# Patient Record
Sex: MALE | Race: WHITE | NOT HISPANIC OR LATINO | Employment: FULL TIME | URBAN - METROPOLITAN AREA
[De-identification: names, ages, dates, MRNs, and addresses within clinical notes are randomized per-mention and may not be internally consistent; named-entity substitution may affect disease eponyms.]

---

## 2017-01-23 ENCOUNTER — GENERIC CONVERSION - ENCOUNTER (OUTPATIENT)
Dept: OTHER | Facility: OTHER | Age: 62
End: 2017-01-23

## 2017-02-02 ENCOUNTER — ALLSCRIPTS OFFICE VISIT (OUTPATIENT)
Dept: OTHER | Facility: OTHER | Age: 62
End: 2017-02-02

## 2017-04-03 ENCOUNTER — TRANSCRIBE ORDERS (OUTPATIENT)
Dept: ADMINISTRATIVE | Facility: HOSPITAL | Age: 62
End: 2017-04-03

## 2017-04-11 ENCOUNTER — HOSPITAL ENCOUNTER (OUTPATIENT)
Dept: SLEEP CENTER | Facility: HOSPITAL | Age: 62
Discharge: HOME | End: 2017-04-11
Attending: INTERNAL MEDICINE
Payer: COMMERCIAL

## 2017-04-11 ENCOUNTER — TRANSCRIBE ORDERS (OUTPATIENT)
Dept: SLEEP CENTER | Facility: HOSPITAL | Age: 62
End: 2017-04-11

## 2017-04-11 DIAGNOSIS — G47.33 OSA TREATED WITH BIPAP: ICD-10-CM

## 2017-04-11 DIAGNOSIS — G47.33 OBSTRUCTIVE SLEEP APNEA (ADULT) (PEDIATRIC): Primary | ICD-10-CM

## 2017-08-08 ENCOUNTER — GENERIC CONVERSION - ENCOUNTER (OUTPATIENT)
Dept: OTHER | Facility: OTHER | Age: 62
End: 2017-08-08

## 2017-09-18 ENCOUNTER — GENERIC CONVERSION - ENCOUNTER (OUTPATIENT)
Dept: OTHER | Facility: OTHER | Age: 62
End: 2017-09-18

## 2017-09-19 ENCOUNTER — ALLSCRIPTS OFFICE VISIT (OUTPATIENT)
Dept: OTHER | Facility: OTHER | Age: 62
End: 2017-09-19

## 2017-09-20 ENCOUNTER — APPOINTMENT (OUTPATIENT)
Dept: LAB | Facility: CLINIC | Age: 62
End: 2017-09-20
Payer: COMMERCIAL

## 2017-09-20 ENCOUNTER — TRANSCRIBE ORDERS (OUTPATIENT)
Dept: ADMINISTRATIVE | Facility: HOSPITAL | Age: 62
End: 2017-09-20

## 2017-09-20 DIAGNOSIS — I25.10 ATHEROSCLEROSIS OF NATIVE CORONARY ARTERY, ANGINA PRESENCE UNSPECIFIED, UNSPECIFIED WHETHER NATIVE OR TRANSPLANTED HEART: ICD-10-CM

## 2017-09-20 DIAGNOSIS — E78.2 MIXED HYPERLIPIDEMIA: ICD-10-CM

## 2017-09-20 DIAGNOSIS — I71.4 ABDOMINAL AORTIC ANEURYSM WITHOUT RUPTURE (HCC): Primary | ICD-10-CM

## 2017-09-20 DIAGNOSIS — IMO0002 UNCONTROLLED TYPE 2 DIABETES MELLITUS WITH COMPLICATION, WITHOUT LONG-TERM CURRENT USE OF INSULIN: ICD-10-CM

## 2017-09-20 DIAGNOSIS — I71.4 ABDOMINAL AORTIC ANEURYSM WITHOUT RUPTURE (HCC): ICD-10-CM

## 2017-09-20 LAB
ALBUMIN SERPL BCP-MCNC: 3.7 G/DL (ref 3.5–5)
ALP SERPL-CCNC: 64 U/L (ref 46–116)
ALT SERPL W P-5'-P-CCNC: 21 U/L (ref 12–78)
ANION GAP SERPL CALCULATED.3IONS-SCNC: 6 MMOL/L (ref 4–13)
AST SERPL W P-5'-P-CCNC: 11 U/L (ref 5–45)
BASOPHILS # BLD AUTO: 0.02 THOUSANDS/ΜL (ref 0–0.1)
BASOPHILS NFR BLD AUTO: 0 % (ref 0–1)
BILIRUB DIRECT SERPL-MCNC: 0.14 MG/DL (ref 0–0.2)
BILIRUB SERPL-MCNC: 0.41 MG/DL (ref 0.2–1)
BUN SERPL-MCNC: 16 MG/DL (ref 5–25)
CALCIUM SERPL-MCNC: 9.8 MG/DL (ref 8.3–10.1)
CHLORIDE SERPL-SCNC: 106 MMOL/L (ref 100–108)
CHOLEST SERPL-MCNC: 113 MG/DL (ref 50–200)
CK SERPL-CCNC: 65 U/L (ref 39–308)
CO2 SERPL-SCNC: 25 MMOL/L (ref 21–32)
CREAT SERPL-MCNC: 0.85 MG/DL (ref 0.6–1.3)
EOSINOPHIL # BLD AUTO: 0.02 THOUSAND/ΜL (ref 0–0.61)
EOSINOPHIL NFR BLD AUTO: 0 % (ref 0–6)
ERYTHROCYTE [DISTWIDTH] IN BLOOD BY AUTOMATED COUNT: 13.3 % (ref 11.6–15.1)
EST. AVERAGE GLUCOSE BLD GHB EST-MCNC: 154 MG/DL
GFR SERPL CREATININE-BSD FRML MDRD: 93 ML/MIN/1.73SQ M
GLUCOSE P FAST SERPL-MCNC: 148 MG/DL (ref 65–99)
HBA1C MFR BLD: 7 % (ref 4.2–6.3)
HCT VFR BLD AUTO: 43 % (ref 36.5–49.3)
HDLC SERPL-MCNC: 52 MG/DL (ref 40–60)
HGB BLD-MCNC: 14.1 G/DL (ref 12–17)
LDLC SERPL CALC-MCNC: 48 MG/DL (ref 0–100)
LYMPHOCYTES # BLD AUTO: 1.08 THOUSANDS/ΜL (ref 0.6–4.47)
LYMPHOCYTES NFR BLD AUTO: 8 % (ref 14–44)
MCH RBC QN AUTO: 31.1 PG (ref 26.8–34.3)
MCHC RBC AUTO-ENTMCNC: 32.8 G/DL (ref 31.4–37.4)
MCV RBC AUTO: 95 FL (ref 82–98)
MONOCYTES # BLD AUTO: 0.36 THOUSAND/ΜL (ref 0.17–1.22)
MONOCYTES NFR BLD AUTO: 3 % (ref 4–12)
NEUTROPHILS # BLD AUTO: 11.78 THOUSANDS/ΜL (ref 1.85–7.62)
NEUTS SEG NFR BLD AUTO: 89 % (ref 43–75)
NRBC BLD AUTO-RTO: 0 /100 WBCS
PLATELET # BLD AUTO: 170 THOUSANDS/UL (ref 149–390)
PMV BLD AUTO: 11.2 FL (ref 8.9–12.7)
POTASSIUM SERPL-SCNC: 4.4 MMOL/L (ref 3.5–5.3)
PROT SERPL-MCNC: 7.9 G/DL (ref 6.4–8.2)
RBC # BLD AUTO: 4.54 MILLION/UL (ref 3.88–5.62)
SODIUM SERPL-SCNC: 137 MMOL/L (ref 136–145)
TRIGL SERPL-MCNC: 66 MG/DL
TSH SERPL DL<=0.05 MIU/L-ACNC: 0.67 UIU/ML (ref 0.36–3.74)
WBC # BLD AUTO: 13.32 THOUSAND/UL (ref 4.31–10.16)

## 2017-09-20 PROCEDURE — 36415 COLL VENOUS BLD VENIPUNCTURE: CPT

## 2017-09-20 PROCEDURE — 80061 LIPID PANEL: CPT

## 2017-09-20 PROCEDURE — 80076 HEPATIC FUNCTION PANEL: CPT

## 2017-09-20 PROCEDURE — 84443 ASSAY THYROID STIM HORMONE: CPT

## 2017-09-20 PROCEDURE — 82550 ASSAY OF CK (CPK): CPT

## 2017-09-20 PROCEDURE — 80048 BASIC METABOLIC PNL TOTAL CA: CPT

## 2017-09-20 PROCEDURE — 85025 COMPLETE CBC W/AUTO DIFF WBC: CPT

## 2017-09-20 PROCEDURE — 83036 HEMOGLOBIN GLYCOSYLATED A1C: CPT

## 2017-10-02 ENCOUNTER — GENERIC CONVERSION - ENCOUNTER (OUTPATIENT)
Dept: OTHER | Facility: OTHER | Age: 62
End: 2017-10-02

## 2017-10-09 ENCOUNTER — GENERIC CONVERSION - ENCOUNTER (OUTPATIENT)
Dept: OTHER | Facility: OTHER | Age: 62
End: 2017-10-09

## 2017-10-23 ENCOUNTER — GENERIC CONVERSION - ENCOUNTER (OUTPATIENT)
Dept: FAMILY MEDICINE CLINIC | Facility: CLINIC | Age: 62
End: 2017-10-23

## 2017-10-24 ENCOUNTER — GENERIC CONVERSION - ENCOUNTER (OUTPATIENT)
Dept: OTHER | Facility: OTHER | Age: 62
End: 2017-10-24

## 2017-10-26 ENCOUNTER — GENERIC CONVERSION - ENCOUNTER (OUTPATIENT)
Dept: OTHER | Facility: OTHER | Age: 62
End: 2017-10-26

## 2017-11-02 ENCOUNTER — GENERIC CONVERSION - ENCOUNTER (OUTPATIENT)
Dept: OTHER | Facility: OTHER | Age: 62
End: 2017-11-02

## 2017-11-14 ENCOUNTER — GENERIC CONVERSION - ENCOUNTER (OUTPATIENT)
Dept: OTHER | Facility: OTHER | Age: 62
End: 2017-11-14

## 2017-12-20 ENCOUNTER — GENERIC CONVERSION - ENCOUNTER (OUTPATIENT)
Dept: FAMILY MEDICINE CLINIC | Facility: CLINIC | Age: 62
End: 2017-12-20

## 2017-12-20 LAB
LEFT EYE DIABETIC RETINOPATHY: NORMAL
RIGHT EYE DIABETIC RETINOPATHY: NORMAL

## 2017-12-27 ENCOUNTER — ALLSCRIPTS OFFICE VISIT (OUTPATIENT)
Dept: OTHER | Facility: OTHER | Age: 62
End: 2017-12-27

## 2017-12-28 NOTE — PROGRESS NOTES
Assessment   1  Controlled type 2 diabetes mellitus without complication (345 82) (E10 8)   2  Chronic obstructive pulmonary disease (496) (J44 9)   3  Essential hypertension (401 9) (I10)   4  Hyperlipidemia (272 4) (E78 5)    Plan   Biceps tendinitis of right upper extremity    · Vicodin 5-300 MG Oral Tablet; 1 every 6 hours for pain  Controlled type 2 diabetes mellitus without complication    · Glimepiride 1 MG Oral Tablet; TAKE ONE TABLET BY MOUTH TWICE DAILY   · Kombiglyze XR 5-1000 MG Oral Tablet Extended Release 24 Hour; One daily  COPD (chronic obstructive pulmonary disease) with acute bronchitis    · ProAir  (90 Base) MCG/ACT Inhalation Aerosol Solution; INHALE 1 TO 2    PUFFS EVERY 6 HOURS AS NEEDED  Lumbago    · Cyclobenzaprine HCl - 5 MG Oral Tablet; 1 tablet every 6-8 hours prn    Discussion/Summary      Patient is doing well with no acute complaints medications current medications in 6 months or as needed on health maintenance  Chief Complaint   Check up Diabetes/Hypertension/CAD/Hyperlipidemia  Renew all medicines to Prof Pharm  Patient is here today for follow up of chronic conditions described in HPI  History of Present Illness   here for checkup acute complaints  refills of meds      Review of Systems        Constitutional: No fever or chills, feels well, no tiredness, no recent weight gain or weight loss  Eyes: No complaints of eye pain, no red eyes, no discharge from eyes, no itchy eyes  ENT: no complaints of earache, no hearing loss, no nosebleeds, no nasal discharge, no sore throat, no hoarseness  Cardiovascular: No complaints of slow heart rate, no fast heart rate, no chest pain, no palpitations, no leg claudication, no lower extremity  Respiratory: No complaints of shortness of breath, no wheezing, no cough, no SOB on exertion, no orthopnea or PND        Gastrointestinal: No complaints of abdominal pain, no constipation, no nausea or vomiting, no diarrhea or bloody stools  Genitourinary: No complaints of dysuria, no incontinence, no hesitancy, no nocturia, no genital lesion, no testicular pain  Musculoskeletal: No complaints of arthralgia, no myalgias, no joint swelling or stiffness, no limb pain or swelling  Integumentary: No complaints of skin rash or skin lesions, no itching, no skin wound, no dry skin  Neurological: No compliants of headache, no confusion, no convulsions, no numbness or tingling, no dizziness or fainting, no limb weakness, no difficulty walking  Psychiatric: Is not suicidal, no sleep disturbances, no anxiety or depression, no change in personality, no emotional problems  Endocrine: No complaints of proptosis, no hot flashes, no muscle weakness, no erectile dysfunction, no deepening of the voice, no feelings of weakness  Hematologic/Lymphatic: No complaints of swollen glands, no swollen glands in the neck, does not bleed easily, no easy bruising  Active Problems   1  Biceps tendinitis of right upper extremity (726 12) (M75 21)   2  BPH without urinary obstruction (600 00) (N40 0)   3  Chronic obstructive pulmonary disease (496) (J44 9)   4  Controlled type 2 diabetes mellitus without complication (271 62) (M88 5)   5  COPD (chronic obstructive pulmonary disease) with acute bronchitis (491 22)     (J44 0,J20 9)   6  Coronary artery disease (414 00) (I25 10)   7  Essential hypertension (401 9) (I10)   8  Hyperlipidemia (272 4) (E78 5)   9  Lumbago (724 2) (M54 5)   10  Obesity (278 00) (E66 9)   11  Sleep apnea (780 57) (G47 30)    Past Medical History   1  Acute bronchitis (466 0) (J20 9)   2  History of Arthralgia of left knee (719 46) (M25 562)   3  History of Bronchitis, asthmatic (493 90) (J45 909)   4  History of Cough (786 2) (R05)   5  History of Furuncle (680 9) (L02 92)   6  History of Hamstring strain, left, initial encounter   7  History of acute bronchitis (V12 69) (Z87 09)   8   History of acute sinusitis (V12 69) (Z87 09)   9  History of wheezing (V12 69) (Z87 898)   10  Old myocardial infarction (412) (I25 2)   11  History of Skin growth (239 2) (D49 2)   12  History of Tear of medial meniscus of left knee, subsequent encounter (V58 89,836 0)      (S83 242D)   13  History of Upper respiratory infection (465 9) (J06 9)     The active problems and past medical history were reviewed and updated today  Surgical History   1  History of Appendectomy   2  History of CABG   3  History of Knee Arthroscopy (Therapeutic)   4  History of Knee Surgery   5  History of PTCA   6  History of Tonsillectomy With Adenoidectomy   7  History of Umbilical Hernia Repair     The surgical history was reviewed and updated today  Family History   Mother    1  Adopted (V68 89) (Z02 82)  Father    2  Adopted (W66 69) (Z02 82)     The family history was reviewed and updated today  Social History    · Former smoker (V15 82) (H53 976)   · Marital History - Currently    · Sedentary Lifestyle (V69 0)   · Working Full Time  The social history was reviewed and updated today  The social history was reviewed and is unchanged  Current Meds    1  Atenolol 25 MG Oral Tablet; Take 1 tablet daily Recorded   2  Karla Contour Test In Vitro Strip; TEST ONCE DAILY; Therapy: 23ZUY1688 to (Last Rx:26Oct2017)  Requested for: 26Oct2017 Ordered   3  Cyclobenzaprine HCl - 5 MG Oral Tablet; 1 tablet every 6-8 hours prn; Therapy: 63KSI0247 to (Evaluate:50Lhl3257)  Requested for: 70MYC3797; Last     Rx:50Nog5716 Ordered   4  Glimepiride 1 MG Oral Tablet; TAKE ONE TABLET BY MOUTH TWICE DAILY; Therapy: 98OHS3301 to [phone])  Requested for: 26Oct2017; Last     Rx:26Oct2017 Ordered   5  Hydrocodone-Acetaminophen 5-325 MG Oral Tablet; Take one tab every 4-6 hours as     needed Recorded   6  Kombiglyze XR 5-1000 MG Oral Tablet Extended Release 24 Hour; One daily;      Therapy: 53SYG0407 to (GJYJEVNT:49FNO4998)  Requested for: 26Oct2017; Last     Rx:26Oct2017 Ordered   7  Lancets Miscellaneous; use daily as directed; Therapy: 85UCE2590 to (Last Rx:26Oct2017)  Requested for: 26Oct2017 Ordered   8  Plavix 75 MG Oral Tablet; Take 1 tablet daily Recorded   9  ProAir  (90 Base) MCG/ACT Inhalation Aerosol Solution; INHALE 1 TO 2 PUFFS     EVERY 6 HOURS AS NEEDED; Therapy: 80BZF0560 to (Last Rx:02Feb2017)  Requested for: 02Feb2017 Ordered   10  SEROquel 100 MG Oral Tablet; TAKE 1 TABLET DAILY; Therapy: [phone]) to Recorded   11  Viagra 100 MG Oral Tablet; Therapy: 96NYY1719 to (Evaluate:18Nov2014) Recorded   12  Vicodin 5-300 MG Oral Tablet; 1 every 6 hours for pain; Therapy: 19Sep2017 to (Evaluate:24Sep2017); Last Rx:19Sep2017 Ordered   13  Vytorin 10-40 MG Oral Tablet; TAKE 1 TABLET DAILY; Therapy: (07 [phone]) to Recorded     The medication list was reviewed and updated today  Allergies   1  Penicillins    Vitals   Vital Signs    Recorded: 42VED1567 11:20AM   Heart Rate 60   Systolic 012, RUE, Sitting   Diastolic 80, RUE, Sitting   Height 5 ft 10 in   Weight 243 lb    BMI Calculated 34 87   BSA Calculated 2 27     Physical Exam        Constitutional      General appearance: No acute distress, well appearing and well nourished  Ears, Nose, Mouth, and Throat      Otoscopic examination: Tympanic membrance translucent with normal light reflex  Canals patent without erythema  Oropharynx: Normal with no erythema, edema, exudate or lesions  Pulmonary      Respiratory effort: No increased work of breathing or signs of respiratory distress  Auscultation of lungs: Clear to auscultation, equal breath sounds bilaterally, no wheezes, no rales, no rhonci  Cardiovascular      Auscultation of heart: Normal rate and rhythm, normal S1 and S2, without murmurs         Carotid pulses: Normal        Abdomen      Abdomen: Non-tender, no masses  Liver and spleen: No hepatomegaly or splenomegaly  Lymphatic      Palpation of lymph nodes in neck: No lymphadenopathy  Musculoskeletal      Gait and station: Normal        Inspection/palpation of joints, bones, and muscles: Normal        Skin      Skin and subcutaneous tissue: Normal without rashes or lesions  Neurologic      Cranial nerves: Cranial nerves 2-12 intact  Sensation: No sensory loss  Psychiatric      Orientation to person, place and time: Normal        Mood and affect: Normal           Health Management   Chronic obstructive pulmonary disease   Complete PFT; every 1 year; Next Due: 43PWH8941; Overdue  Controlled type 2 diabetes mellitus without complication   (1) BASIC METABOLIC PROFILE; every 1 year; Last 70PAF9479; Next Due: 97SIC7146; Active  (1) LIPID PANEL, FASTING; every 1 year; Last 33WYR8453; Next Due: 98Ldg8773; Overdue  (1) MICROALBUMIN CREATININE RATIO, RANDOM URINE; every 1 year; Last 60Xcf0899; Next Due:    65RHO3625; Overdue  *VB - Eye Exam; every 1 year; Last 12Qng8477; Next Due: 11Ycu2361; Active  History of Screen for colon cancer   COLONOSCOPY; every 5 years; Last 34VHO4129; Next Due: 19VHJ6007;  Active    Future Appointments      Date/Time Provider Specialty Site   07/02/2018 02:45 PM Caitlyn Bliss DO Family Medicine [facility]     Signatures    Electronically signed by : Eliz Crespo DO; Dec 27 2017  4:38PM EST                       (Author)

## 2018-01-12 NOTE — MISCELLANEOUS
Message   Recorded as Task   Date: 01/23/2017 10:14 AM, Created By: Kamilah Hackett   Task Name: Follow Up   Assigned To: Nydia Ni   Regarding Patient: Peggy Mcgregor, Status: Active   CommentGigail Conrado - 23 Jan 2017 10:14 AM     TASK CREATED  Caller: Self; Other; [phone] (Home); [phone] (Work)  PT NEEDS PRE [address] [employer] TO FUTURE SCRIPTS  HE CAN BE REACHED [phone]   Sukhjinder Argueta - 23 Jan 2017 12:42 PM     TASK EDITED  tell him I sent it out this morning it may take a day or 2   Nydia Ni - 23 Jan 2017 1:23 PM     TASK EDITED  Message left on cell -- will call when prior auth comes through  [facility]        Active Problems    1  BPH without urinary obstruction (600 00) (N40 0)   2  Chronic obstructive pulmonary disease (496) (J44 9)   3  Controlled type 2 diabetes mellitus without complication (825 86) (X11 0)   4  COPD (chronic obstructive pulmonary disease) with acute bronchitis (491 22) (J44 0)   5  Coronary artery disease (414 00) (I25 10)   6  Essential hypertension (401 9) (I10)   7  Hyperlipidemia (272 4) (E78 5)   8  Lumbago (724 2) (M54 5)   9  Need for pneumococcal vaccination (V03 82) (Z23)   10  Obesity (278 00) (E66 9)   11  Sleep apnea (780 57) (G47 30)    Current Meds   1  Atenolol 25 MG Oral Tablet; Take 1 tablet daily Recorded   2  Karla Contour Test In Vitro Strip; TEST ONCE DAILY; Therapy: 91JST7876 to (Evaluate:79Xca5541)  Requested for: 96Isx6881; Last   Rx:71Lnn3906 Ordered   3  Cyclobenzaprine HCl - 5 MG Oral Tablet; 1 tablet every 6-8 hours prn; Therapy: 81NPV1707 to (Evaluate:64Xwu2692)  Requested for: 15XYG5082; Last   Rx:25Ulb5217 Ordered   4  Glimepiride 1 MG Oral Tablet; TAKE ONE TABLET BY MOUTH TWICE DAILY; Therapy: 82FWK3256 to (Evaluate:96Ddf2132)  Requested for: 22ZFU3378; Last   Rx:26Ipi5476 Ordered   5  Hydrocodone-Acetaminophen 5-325 MG Oral Tablet; Take one tab every 4-6 hours as   needed Recorded   6   Kombiglyze XR 5-1000 MG Oral Tablet Extended Release 24 Hour; One daily; Therapy: 14OIA3531 to (Evaluate:43Eum8257)  Requested for: 33ZHZ9419; Last   Rx:92Col0049 Ordered   7  Meloxicam 15 MG Oral Tablet; TAKE 1 TABLET TWICE DAILY; Therapy: 97QOZ6042 to [phone])  Requested for: 36IJY9665; Last   Rx:27Nov2015 Ordered   8  Plavix 75 MG Oral Tablet (Clopidogrel Bisulfate); Take 1 tablet daily Recorded   9  Proventil  (90 Base) MCG/ACT Inhalation Aerosol Solution; INHALE 2 PUFFS   EVERY 4-6 HOURS AS NEEDED; Therapy: 10CUG8096 to (Deidra Floyd)  Requested for: 47PIC3434; Last   Rx:23Jan2015 Ordered   10  SEROquel 100 MG Oral Tablet (QUEtiapine Fumarate); TAKE 1 TABLET DAILY; Therapy: (450 45 295) to Recorded   11  Viagra 100 MG Oral Tablet; Therapy: 54ITW7202 to (Evaluate:18Nov2014) Recorded   12  Vytorin 10-40 MG Oral Tablet; TAKE 1 TABLET DAILY; Therapy: (Recorded:05Nov2013) to Recorded    Allergies    1   Penicillins    Signatures   Electronically signed by : Markel Marrero, ; Jan 23 2017  1:23PM EST                       (Author)

## 2018-01-14 VITALS
BODY MASS INDEX: 35.5 KG/M2 | SYSTOLIC BLOOD PRESSURE: 130 MMHG | WEIGHT: 248 LBS | TEMPERATURE: 97.9 F | HEART RATE: 68 BPM | HEIGHT: 70 IN | DIASTOLIC BLOOD PRESSURE: 70 MMHG

## 2018-01-14 VITALS
HEIGHT: 70 IN | BODY MASS INDEX: 34.79 KG/M2 | DIASTOLIC BLOOD PRESSURE: 74 MMHG | HEART RATE: 70 BPM | SYSTOLIC BLOOD PRESSURE: 124 MMHG | WEIGHT: 243 LBS

## 2018-01-15 NOTE — MISCELLANEOUS
Provider Comments  Provider Comments:   PT NO SHOW FOR CHECK UP LETTER SENT      Signatures   Electronically signed by : MKYEL Anderson ; Apr 13 2016  4:14PM EST                       (Author)

## 2018-01-17 NOTE — MISCELLANEOUS
Message   Recorded as Task   Date: 03/21/2016 01:10 PM, Created By: Stef Larkin   Task Name: Med Renewal Request   Assigned To: Nydia Ni   Regarding Patient: Patricia Qiu, Status: Active   Comment:    Stef Larkin - 21 Mar 2016 1:10 PM     TASK CREATED  Caller: Self; Renew Medication; [phone] (Home); [phone] (Work)  PT IS DOING A LITTLE BETTER, BUT STILL HAS COUGH, KEEPING HIM UP AT NIGHT  WOULD LIKE ANOTHER SCRIPT WRITTEN  CAN CALL PT [phone]   Sukhjinder Argueta - 21 Mar 2016 4:05 PM     TASK REASSIGNED: Previously Assigned To Elvie Holes him to come in tomorrow for him an appointment   Nydia Ni - 21 Mar 2016 4:41 PM     TASK EDITED  Patient made appt  for 2:15 PM tomorrow 03/22/16  PLM        Active Problems    1  Aftercare following surgery of the musculoskeletal system (V58 78) (Z47 89)   2  Chronic obstructive pulmonary disease (496) (J44 9)   3  Controlled type 2 diabetes mellitus without complication (834 26) (W68 9)   4  COPD (chronic obstructive pulmonary disease) with acute bronchitis (491 22) (J44 0)   5  Coronary artery disease (414 00) (I25 10)   6  Essential hypertension (401 9) (I10)   7  Hyperlipidemia (272 4) (E78 5)   8  Lumbago (724 2) (M54 5)   9  Obesity (278 00) (E66 9)   10  Sleep apnea (780 57) (G47 30)    Current Meds   1  Atenolol 25 MG Oral Tablet; Take 1 tablet daily Recorded   2  Karla Contour Test In Vitro Strip; TEST ONCE DAILY; Therapy: 40ADK8280 to (Evaluate:74Wwz9359)  Requested for: 42Xvd0795; Last   Rx:63Mkp8196 Ordered   3  Cefuroxime Axetil 500 MG Oral Tablet; Take 1 tablet twice daily; Therapy: 05DAW7760 to (Complete:25Mar2016)  Requested for: 64SNY8394; Last   Rx:15Mar2016 Ordered   4  Cyclobenzaprine HCl - 5 MG Oral Tablet; 1 tablet every 6-8 hours prn; Therapy: 05AYW8470 to ([phone])  Requested for: 85GEO7843; Last   Rx:02Oct2015 Ordered   5  Kombiglyze XR 5-1000 MG Oral Tablet Extended Release 24 Hour;  One daily; Therapy: 45YPE0856 to (Evaluate:66Edv7157)  Requested for: 51Old8642; Last   Rx:23Feb2016 Ordered   6  Meloxicam 15 MG Oral Tablet; TAKE 1 TABLET TWICE DAILY; Therapy: 69IWE7264 to (Anaid Feast)  Requested for: 77TTM0764; Last   Rx:27Nov2015 Ordered   7  Plavix 75 MG Oral Tablet (Clopidogrel Bisulfate); Take 1 tablet daily Recorded   8  Proventil  (90 Base) MCG/ACT Inhalation Aerosol Solution; INHALE 2 PUFFS   EVERY 4-6 HOURS AS NEEDED; Therapy: 13KPA4669 to (Emma Caio)  Requested for: 36GTF5491; Last   Rx:23Jan2015 Ordered   9  SEROquel 100 MG Oral Tablet (QUEtiapine Fumarate); TAKE 1 TABLET DAILY; Therapy: ([phone]) to Recorded   10  Viagra 100 MG Oral Tablet; Therapy: 99WOO9231 to (Evaluate:18Nov2014) Recorded   11  Vytorin 10-40 MG Oral Tablet; TAKE 1 TABLET DAILY; Therapy: (Recorded:05Nov2013) to Recorded    Allergies    1   Penicillins    Signatures   Electronically signed by : Alda Rojas, ; Mar 21 2016  4:42PM EST                       (Author)

## 2018-01-19 ENCOUNTER — OFFICE VISIT (OUTPATIENT)
Dept: URGENT CARE | Facility: CLINIC | Age: 63
End: 2018-01-19
Payer: COMMERCIAL

## 2018-01-19 PROCEDURE — G0382 LEV 3 HOSP TYPE B ED VISIT: HCPCS

## 2018-01-19 PROCEDURE — 99283 EMERGENCY DEPT VISIT LOW MDM: CPT

## 2018-01-20 NOTE — PROGRESS NOTES
Assessment   1  Acute bronchitis (466 0) (J20 9)    Plan   Acute bronchitis    · Azithromycin 250 MG Oral Tablet; TAKE 2 TABLETS ON DAY 1 THEN TAKE 1    TABLET A DAY FOR 4 DAYS   · Promethazine-Codeine 6 25-10 MG/5ML Oral Syrup; TAKE 5 ML EVERY 4 TO 6    HOURS AS NEEDED    Chief Complaint   1  Cold Symptoms  Chief Complaint Free Text Note Form: Two days ago the patient developed a productive cough and chills  He denies any fevers  History of Present Illness   HPI: This patient has been ill for the last 2 days with a paroxysmal cough productive of mostly clear sputum  No documented fever although he has had some chills and aches  No sore throat or nasal congestion  Cough has been keeping him up at night  No chest pain or dyspnea  is alert oriented pleasant and in no distress  He does not appear toxic  Pupils equal react to light sclerae white conjunctiva pink nose is clear  Throat is clear and moist without inflammation  Neck is supple without nodes  TMs are normal  Lungs are clear bilaterally with good breath sounds  No wheezing rales or rhonchi  Heart is regular  Good skin color and turgor  No skin rash  Hospital Based Practices Required Assessment:      Pain Assessment      the patient states they do not have pain  Abuse And Domestic Violence Screen       Yes, the patient is safe at home  -- The patient states no one is hurting them  Depression And Suicide Screen  No, the patient has not had thoughts of hurting themself  No, the patient has not felt depressed in the past 7 days  Active Problems   1  Biceps tendinitis of right upper extremity (726 12) (M75 21)   2  BPH without urinary obstruction (600 00) (N40 0)   3  Chronic obstructive pulmonary disease (496) (J44 9)   4  Controlled type 2 diabetes mellitus without complication (372 51) (R68 3)   5  COPD (chronic obstructive pulmonary disease) with acute bronchitis (491 22)     (J44 0,J20 9)   6   Coronary artery disease (414 00) (I25 10)   7  Essential hypertension (401 9) (I10)   8  Hyperlipidemia (272 4) (E78 5)   9  Lumbago (724 2) (M54 5)   10  Obesity (278 00) (E66 9)   11  Sleep apnea (780 57) (G47 30)    Past Medical History   1  Acute bronchitis (466 0) (J20 9)   2  History of Arthralgia of left knee (719 46) (M25 562)   3  History of Bronchitis, asthmatic (493 90) (J45 909)   4  History of Cough (786 2) (R05)   5  History of Furuncle (680 9) (L02 92)   6  History of Hamstring strain, left, initial encounter   7  History of acute bronchitis (V12 69) (Z87 09)   8  History of acute sinusitis (V12 69) (Z87 09)   9  History of wheezing (V12 69) (Z87 898)   10  Old myocardial infarction (412) (I25 2)   11  History of Skin growth (239 2) (D49 2)   12  History of Tear of medial meniscus of left knee, subsequent encounter (V58 89,836 0)      (S83 242D)   13  History of Upper respiratory infection (465 9) (J06 9)  Active Problems And Past Medical History Reviewed: The active problems and past medical history were reviewed and updated today  Family History   Mother    1  Adopted (V68 89) (Z02 82)  Father    2  Adopted (V68 89) (Z02 82)  Family History Reviewed: The family history was reviewed and updated today  Social History    · Former smoker (V15 82) (F41 266)   · Marital History - Currently    · Sedentary Lifestyle (V69 0)   · Working Full Time  Social History Reviewed: The social history was reviewed and updated today  Surgical History   1  History of Appendectomy   2  History of CABG   3  History of Knee Arthroscopy (Therapeutic)   4  History of Knee Surgery   5  History of PTCA   6  History of Tonsillectomy With Adenoidectomy   7  History of Umbilical Hernia Repair  Surgical History Reviewed: The surgical history was reviewed and updated today  Current Meds    1  Atenolol 25 MG Oral Tablet; Take 1 tablet daily Recorded   2  Karla Contour Test In Vitro Strip; TEST ONCE DAILY;      Therapy: 96SAR6057 to (Last Rx:26Oct2017)  Requested for: 26Oct2017 Ordered   3  Cyclobenzaprine HCl - 5 MG Oral Tablet; 1 tablet every 6-8 hours prn; Therapy: 00AND2637 to (Evaluate:16Jan2018)  Requested for: 27Dec2017; Last     Rx:25Zsg0876 Ordered   4  Glimepiride 1 MG Oral Tablet; TAKE ONE TABLET BY MOUTH TWICE DAILY; Therapy: 41RSJ3656 to 97 823628)  Requested for: 27Dec2017; Last     Rx:27Dec2017 Ordered   5  Hydrocodone-Acetaminophen 5-325 MG Oral Tablet; Take one tab every 4-6 hours as     needed Recorded   6  Kombiglyze XR 5-1000 MG Oral Tablet Extended Release 24 Hour; One daily; Therapy: 85JYX0081 to (KJUVHDOQ:84LIS6078)  Requested for: 27Dec2017; Last     Rx:86Zge6766 Ordered   7  Lancets Miscellaneous; use daily as directed; Therapy: 59FFN6550 to (Last Rx:26Oct2017)  Requested for: 26Oct2017 Ordered   8  Plavix 75 MG Oral Tablet; Take 1 tablet daily Recorded   9  ProAir  (90 Base) MCG/ACT Inhalation Aerosol Solution; INHALE 1 TO 2 PUFFS     EVERY 6 HOURS AS NEEDED; Therapy: 46QJT7989 to (Last Rx:02Zjl3367)  Requested for: 27Dec2017 Ordered   10  SEROquel 100 MG Oral Tablet; TAKE 1 TABLET DAILY; Therapy: (106 6274) to Recorded   11  Viagra 100 MG Oral Tablet; Therapy: 84KAK5507 to (Evaluate:18Nov2014) Recorded   12  Vicodin 5-300 MG Oral Tablet; 1 every 6 hours for pain; Therapy: 50Dqe4973 to (Evaluate:01Jan2018); Last Rx:27Dec2017 Ordered   13  Vytorin 10-40 MG Oral Tablet; TAKE 1 TABLET DAILY; Therapy: (291 0371) to Recorded  Medication List Reviewed: The medication list was reviewed and updated today  Allergies   1   Penicillins    Vitals   Signs   Recorded: 68IFQ6131 05:00PM   Temperature: 98 9 F  Heart Rate: 66  Respiration: 14  Systolic: 384  Diastolic: 78  Height: 5 ft 10 in  Weight: 245 lb   BMI Calculated: 35 15  BSA Calculated: 2 28  O2 Saturation: 98  Pain Scale: 0    Future Appointments      Date/Time Provider Specialty Site 07/02/2018 02:45 PM Rodriguez Bliss,  Family Medicine [facility]     Signatures    Electronically signed by : MYKEL Carlos ; Jan 19 2018  5:10PM EST                       (Author)

## 2018-01-23 ENCOUNTER — GENERIC CONVERSION - ENCOUNTER (OUTPATIENT)
Dept: OTHER | Facility: OTHER | Age: 63
End: 2018-01-23

## 2018-01-23 VITALS
WEIGHT: 243 LBS | DIASTOLIC BLOOD PRESSURE: 80 MMHG | SYSTOLIC BLOOD PRESSURE: 120 MMHG | BODY MASS INDEX: 34.79 KG/M2 | HEIGHT: 70 IN | HEART RATE: 60 BPM

## 2018-01-23 VITALS
WEIGHT: 245 LBS | HEART RATE: 66 BPM | BODY MASS INDEX: 35.07 KG/M2 | HEIGHT: 70 IN | SYSTOLIC BLOOD PRESSURE: 122 MMHG | DIASTOLIC BLOOD PRESSURE: 78 MMHG | RESPIRATION RATE: 14 BRPM | TEMPERATURE: 98.9 F | OXYGEN SATURATION: 98 %

## 2018-01-24 NOTE — MISCELLANEOUS
Message   Recorded as Task   Date: 01/23/2018 11:45 AM, Created By: Megan Huber   Task Name: Call Back   Assigned To: Nydia Ni   Regarding Patient: Lilliam Kelsey, Status: Active   CommentGery Robin - 23 Jan 2018 11:45 AM     TASK CREATED  Caller: Self; Other; [phone] (Home); [phone] (Work)  PT WAS AT URGENT CARE FRIDAY NIGHT FOR COLD AND COUGH AND CONGESTION AND THEY GAVE HIM ANTIBIOTIC WHICH HE FINISHED AND IS STILL NOT QUITE WELL, HE WOULD LIKE TO KNOW IF YOU COULD CALL HIM IN A Z PACK AND [facility] TO TRY AND KNOCK OUT THE COLD, CAN YOU CALL HIM [phone] TO [facility] FOR COUGH MED IF YOU WRITE HIM A RX FOR IT  Page,Leo - 23 Jan 2018 5:02 PM     TASK EDITED  i called in his zpack  please call in the promethazine with Nydia Mckenna - 23 Jan 2018 5:11 PM     TASK EDITED  Patient advised:  scripts called into [facility]  PLM        Active Problems    1  Acute bronchitis (466 0) (J20 9)   2  Biceps tendinitis of right upper extremity (726 12) (M75 21)   3  BPH without urinary obstruction (600 00) (N40 0)   4  Chronic obstructive pulmonary disease (496) (J44 9)   5  Controlled type 2 diabetes mellitus without complication (085 48) (F68 6)   6  COPD (chronic obstructive pulmonary disease) with acute bronchitis (491 22)   (J44 0,J20 9)   7  Coronary artery disease (414 00) (I25 10)   8  Essential hypertension (401 9) (I10)   9  Hyperlipidemia (272 4) (E78 5)   10  Lumbago (724 2) (M54 5)   11  Obesity (278 00) (E66 9)   12  Sleep apnea (780 57) (G47 30)    Current Meds   1  Atenolol 25 MG Oral Tablet; Take 1 tablet daily Recorded   2  Azithromycin 250 MG Oral Tablet; TAKE 2 TABLETS ON DAY 1 THEN TAKE 1 TABLET A   DAY FOR 4 DAYS; Therapy: 96NMH2857 to (Last Rx:19Jan2018)  Requested for: 70CBF7866 Ordered   3  Karla Contour Test In Vitro Strip; TEST ONCE DAILY;    Therapy: 94DUE1750 to (Last Amaryllis Loges)  Requested for: [phone] Ordered   4  Cyclobenzaprine HCl - 5 MG Oral Tablet; 1 tablet every 6-8 hours prn; Therapy: 04SYQ4512 to (Evaluate:16Jan2018)  Requested for: 27Dec2017; Last   Rx:[id number] Ordered   5  Glimepiride 1 MG Oral Tablet; TAKE ONE TABLET BY MOUTH TWICE DAILY; Therapy: 30NDU8189 to [phone])  Requested for: 27Dec2017; Last   Rx:[id number] Ordered   6  Hydrocodone-Acetaminophen 5-325 MG Oral Tablet; Take one tab every 4-6 hours as   needed Recorded   7  Kombiglyze XR 5-1000 MG Oral Tablet Extended Release 24 Hour; One daily; Therapy: 21FQT4130 [facility])  Requested for: 27Dec2017; Last   Rx:[id number] Ordered   8  Lancets Miscellaneous; use daily as directed; Therapy: 40RFW7340 to (Last Rx:26Oct2017)  Requested for: 26Oct2017 Ordered   9  Plavix 75 MG Oral Tablet (Clopidogrel Bisulfate); Take 1 tablet daily Recorded   10  ProAir  (90 Base) MCG/ACT Inhalation Aerosol Solution; INHALE 1 TO 2 PUFFS    EVERY 6 HOURS AS NEEDED; Therapy: 14JLW3374 to (Last Rx:[id number])  Requested for: 27Dec2017 Ordered   11  Promethazine-Codeine 6 25-10 MG/5ML Oral Syrup; TAKE 5 ML EVERY 4 TO 6 HOURS    AS NEEDED; Therapy: 18DNX9650 to [phone] [id number]); Last Rx:19Jan2018 Ordered   12  SEROquel 100 MG Oral Tablet (QUEtiapine Fumarate); TAKE 1 TABLET DAILY; Therapy: [phone]) to Recorded   13  Viagra 100 MG Oral Tablet (Sildenafil Citrate); Therapy: 64GNV7093 to (Evaluate:18Nov2014) Recorded   14  Vicodin 5-300 MG Oral Tablet; 1 every 6 hours for pain; Therapy: 03Hkz8609 to (Evaluate:01Jan2018); Last Rx:[id number] Ordered   15  Vytorin 10-40 MG Oral Tablet (Ezetimibe-Simvastatin); TAKE 1 TABLET DAILY; Therapy: (Recorded:05Nov2013) to Recorded    Allergies    1   Penicillins    Plan  Acute bronchitis    · Azithromycin 250 MG Oral Tablet; TAKE 2 TABLETS ON DAY 1 THEN TAKE 1  TABLET A DAY FOR 4 DAYS   · Promethazine-Codeine 6 25-10 MG/5ML Oral Syrup; TAKE 5 ML EVERY 4 TO 6  HOURS AS NEEDED    Signatures   Electronically signed by : Davida Steinberg, ; Jan 23 2018  5:11PM EST                       (Author)

## 2018-04-17 ENCOUNTER — OFFICE VISIT (OUTPATIENT)
Dept: SLEEP CENTER | Facility: HOSPITAL | Age: 63
End: 2018-04-17
Attending: INTERNAL MEDICINE
Payer: COMMERCIAL

## 2018-04-17 VITALS
WEIGHT: 247.2 LBS | HEART RATE: 62 BPM | DIASTOLIC BLOOD PRESSURE: 82 MMHG | HEIGHT: 71 IN | SYSTOLIC BLOOD PRESSURE: 110 MMHG | BODY MASS INDEX: 34.61 KG/M2

## 2018-04-17 DIAGNOSIS — E66.9 OBESITY (BMI 30-39.9): ICD-10-CM

## 2018-04-17 DIAGNOSIS — G25.81 RESTLESS LEG SYNDROME: ICD-10-CM

## 2018-04-17 DIAGNOSIS — F51.12 INSUFFICIENT SLEEP SYNDROME: ICD-10-CM

## 2018-04-17 DIAGNOSIS — G47.33 OBSTRUCTIVE SLEEP APNEA: Primary | ICD-10-CM

## 2018-04-17 DIAGNOSIS — R45.86 MOOD DISTURBANCE: ICD-10-CM

## 2018-04-17 RX ORDER — CLOPIDOGREL BISULFATE 75 MG/1
TABLET ORAL
Refills: 1 | COMMUNITY
Start: 2018-03-31 | End: 2018-04-27 | Stop reason: SDUPTHER

## 2018-04-17 RX ORDER — EZETIMIBE AND SIMVASTATIN 10; 40 MG/1; MG/1
1 TABLET ORAL DAILY
COMMUNITY

## 2018-04-17 RX ORDER — QUETIAPINE FUMARATE 100 MG/1
1 TABLET, FILM COATED ORAL DAILY
COMMUNITY

## 2018-04-17 RX ORDER — CLOPIDOGREL BISULFATE 75 MG/1
1 TABLET ORAL DAILY
COMMUNITY
End: 2018-06-27 | Stop reason: SDUPTHER

## 2018-04-17 NOTE — PROGRESS NOTES
Review of Systems      Genitourinary none   Cardiology none   Gastrointestinal none   Neurology none   Constitutional none   Integumentary none   Psychiatry none   Musculoskeletal none   Pulmonary none   ENT nasal or sinus congestion and ringing in ears   Endocrine none   Hematological none

## 2018-04-17 NOTE — PROGRESS NOTES
Follow-Up Note - Sleep Center   Yu Ro  58 y o  male  :1955  QMI:[id number]    CC: I saw this patient for follow-up in clinic today for his Sleep Disordered Breathing, Coexisting Sleep and Medical Problems  Medications, Past, Family & Social History were reviewed  [Interval changes notable for none reported]     He has a current medication list which includes the following prescription(s): clopidogrel, clopidogrel, ezetimibe-simvastatin, and quetiapine  ROS: reviewed, see attached [& significant for further 6 lb weight loss in the past 1 year  He weighed 285 lb at max and around 265 when he had his last study in ]  HPI:  With respect to positive airway pressure therapy (PAP) compliance data download shows: [ No data was available, but he reports use for > 4hours regularly]   More [place] reports:   -  some difficulty tolerating PAP; [ air leaks from mask and pressure too high]  -  benefiting from use ; [sleeping better]   -  recently he has not been experiencing restless leg symptoms and is not aware of jerking movements during sleep     - he feels mood is stable on Seroquel  Sleep Routine:  More [place] reports getting 6-7 hours sleep on week nights and more on weekends; he has no difficulty initiating, but reports diffculty maintaining sleep   He awakens with the aid of an alarm feeling refreshed He denies excessive drowsiness  [facility] rated himself at Total score: 0 /24 on the [place] sleepiness scale ]      EXAM: Vitals are stable: Blood pressure 110/82, pulse 62, height 5' 11" (1 803 m), weight 112 kg (247 lb 3 2 oz)  Body mass index is 34 48 kg/m²  Sherre Soulier Neck Circumference: 41 5 cm  Patient is alert, orientated, cooperative [and in no distress]  Mental state [appears normal]  There are [no] facial pressure marks or rashes  Apart from lower weight, Physical findings are essentially unchanged from previous  IMPRESSION:     1  Obstructive sleep apnea  Sleep F/U  - established patient   2   Restless leg syndrome     3  Insufficient sleep syndrome     4  Mood disturbance (HCC)     5  Obesity (BMI 30-39  9)         PLAN:  1  I reviewed results of the Sleep studies with the patient  2  He has pressure was adjusted and his no using BiPAP at 20/13 cm H2O   3  Treatment with  PAP is medically necessary and Jacqulin Backers is agreable to continue use  4  Pressure setting: [Continue at 18/12 cm H2O ]   5  The patient's current unit has now reached its five-year reasonable useful life and needs to be replaced  6  He also needs a retitration study because of the significant weight loss and is to be scheduled for this  7  Instruction on care of equipment and strategies to improve comfort with use of PAP were discussed [:controlling mucosal dryness, nasal symptoms and Mask leaks]  8  Care coordinated with DME provider and prescription to replace supplies as needed was provided  9  Need for compliance with therapy and weight reduction were emphasized  10  I also advised allowing sufficient opportunity for sleep  11  With your consent, follow-up is advised in [1 Earmon Contras [or sooner if needed] [to monitor progress, compliance and to adjust therapy]  Thank you for allowing me to participate in the care of this patient      Sincerely,    Bella Ace MD  Board Certified Specialist

## 2018-04-18 ENCOUNTER — TELEPHONE (OUTPATIENT)
Dept: SLEEP CENTER | Facility: CLINIC | Age: 63
End: 2018-04-18

## 2018-04-18 NOTE — TELEPHONE ENCOUNTER
PT requested a new machine  I sent Rx and necessary paperwork to [facility]  I will schedule PT when the order is completed

## 2018-04-27 ENCOUNTER — APPOINTMENT (OUTPATIENT)
Dept: URGENT CARE | Facility: CLINIC | Age: 63
End: 2018-04-27
Payer: COMMERCIAL

## 2018-04-27 ENCOUNTER — OFFICE VISIT (OUTPATIENT)
Dept: URGENT CARE | Facility: CLINIC | Age: 63
End: 2018-04-27
Payer: COMMERCIAL

## 2018-04-27 VITALS
HEART RATE: 68 BPM | RESPIRATION RATE: 18 BRPM | DIASTOLIC BLOOD PRESSURE: 76 MMHG | TEMPERATURE: 98 F | SYSTOLIC BLOOD PRESSURE: 128 MMHG | BODY MASS INDEX: 33.47 KG/M2 | WEIGHT: 240 LBS | OXYGEN SATURATION: 97 %

## 2018-04-27 DIAGNOSIS — J45.901 ASTHMATIC BRONCHITIS WITH ACUTE EXACERBATION, UNSPECIFIED ASTHMA SEVERITY, UNSPECIFIED WHETHER PERSISTENT: ICD-10-CM

## 2018-04-27 DIAGNOSIS — R05.9 COUGH: Primary | ICD-10-CM

## 2018-04-27 DIAGNOSIS — E11.8 TYPE 2 DIABETES MELLITUS WITH COMPLICATION, WITHOUT LONG-TERM CURRENT USE OF INSULIN (HCC): Primary | ICD-10-CM

## 2018-04-27 DIAGNOSIS — I73.9 PAD (PERIPHERAL ARTERY DISEASE) (HCC): ICD-10-CM

## 2018-04-27 PROCEDURE — G0382 LEV 3 HOSP TYPE B ED VISIT: HCPCS | Performed by: EMERGENCY MEDICINE

## 2018-04-27 PROCEDURE — 94640 AIRWAY INHALATION TREATMENT: CPT | Performed by: EMERGENCY MEDICINE

## 2018-04-27 PROCEDURE — 99283 EMERGENCY DEPT VISIT LOW MDM: CPT | Performed by: EMERGENCY MEDICINE

## 2018-04-27 RX ORDER — PREDNISONE 20 MG/1
40 TABLET ORAL DAILY
Qty: 10 TABLET | Refills: 0 | Status: SHIPPED | OUTPATIENT
Start: 2018-04-27 | End: 2018-05-02

## 2018-04-27 RX ORDER — GLIMEPIRIDE 1 MG/1
1 TABLET ORAL 2 TIMES DAILY
Qty: 180 TABLET | Refills: 1 | Status: SHIPPED | OUTPATIENT
Start: 2018-04-27 | End: 2018-06-27 | Stop reason: SDUPTHER

## 2018-04-27 RX ORDER — CLOPIDOGREL BISULFATE 75 MG/1
75 TABLET ORAL ONCE
Qty: 90 TABLET | Refills: 1 | Status: SHIPPED | OUTPATIENT
Start: 2018-04-27 | End: 2018-06-27

## 2018-04-27 NOTE — PROGRESS NOTES
Assessment/Plan: asthmatic bronchitis    No problem-specific Assessment & Plan notes found for this encounter  Diagnoses and all orders for this visit:    Cough    Asthmatic bronchitis with acute exacerbation, unspecified asthma severity, unspecified whether persistent  -     predniSONE 20 mg tablet; Take 2 tablets (40 mg total) by mouth daily for 5 days  -     ipratropium-albuterol (COMBIVENT RESPIMAT) inhaler; Inhale 2 puffs 4 (four) times a day for 7 days          Subjective:      Patient ID: Vonda Quezada is a 58 y o  male  Cough, congestion, wheezing x 1 week; no fever; was on a plane recently      Cough   This is a new problem  The current episode started in the past 7 days  The problem has been gradually worsening  The problem occurs every few minutes  The cough is productive of sputum  Associated symptoms include wheezing  He has tried nothing for the symptoms  The treatment provided no relief  cardiac, sleep apnea       The following portions of the patient's history were reviewed and updated as appropriate: current medications, past family history, past medical history, past social history, past surgical history and problem list     Review of Systems   Respiratory: Positive for cough and wheezing  All other systems reviewed and are negative  Objective:      /76   Pulse 68   Temp 98 °F (36 7 °C)   Resp 18   Wt 109 kg (240 lb)   SpO2 97%   BMI 33 47 kg/m²          Physical Exam   Constitutional: He is oriented to person, place, and time  He appears well-developed and well-nourished  HENT:   Nose: Nose normal    Eyes: Pupils are equal, round, and reactive to light  Neck: Normal range of motion  Cardiovascular: Normal rate and regular rhythm  Pulmonary/Chest: Effort normal  He has wheezes  Neurological: He is alert and oriented to person, place, and time  Skin: Skin is warm and dry  Psychiatric: He has a normal mood and affect   His behavior is normal  Judgment and thought content normal    Nursing note and vitals reviewed

## 2018-06-27 DIAGNOSIS — E11.8 TYPE 2 DIABETES MELLITUS WITH COMPLICATION, WITHOUT LONG-TERM CURRENT USE OF INSULIN (HCC): ICD-10-CM

## 2018-06-27 DIAGNOSIS — I73.9 PAD (PERIPHERAL ARTERY DISEASE) (HCC): Primary | ICD-10-CM

## 2018-06-27 RX ORDER — SAXAGLIPTIN AND METFORMIN HYDROCHLORIDE 5; 1000 MG/1; MG/1
TABLET, FILM COATED, EXTENDED RELEASE ORAL
Qty: 90 TABLET | OUTPATIENT
Start: 2018-06-27

## 2018-06-28 RX ORDER — GLIMEPIRIDE 1 MG/1
1 TABLET ORAL 2 TIMES DAILY
Qty: 180 TABLET | Refills: 1 | Status: SHIPPED | OUTPATIENT
Start: 2018-06-28 | End: 2018-07-02 | Stop reason: SDUPTHER

## 2018-06-28 RX ORDER — CLOPIDOGREL BISULFATE 75 MG/1
75 TABLET ORAL DAILY
Qty: 90 TABLET | Refills: 1 | Status: SHIPPED | OUTPATIENT
Start: 2018-06-28

## 2018-07-02 ENCOUNTER — OFFICE VISIT (OUTPATIENT)
Dept: FAMILY MEDICINE CLINIC | Facility: CLINIC | Age: 63
End: 2018-07-02
Payer: COMMERCIAL

## 2018-07-02 VITALS
WEIGHT: 243 LBS | DIASTOLIC BLOOD PRESSURE: 60 MMHG | HEART RATE: 72 BPM | BODY MASS INDEX: 34.02 KG/M2 | HEIGHT: 71 IN | SYSTOLIC BLOOD PRESSURE: 102 MMHG

## 2018-07-02 DIAGNOSIS — J44.9 CHRONIC OBSTRUCTIVE PULMONARY DISEASE, UNSPECIFIED COPD TYPE (HCC): ICD-10-CM

## 2018-07-02 DIAGNOSIS — M54.50 LOW BACK PAIN WITHOUT SCIATICA, UNSPECIFIED BACK PAIN LATERALITY, UNSPECIFIED CHRONICITY: Primary | ICD-10-CM

## 2018-07-02 DIAGNOSIS — E11.8 TYPE 2 DIABETES MELLITUS WITH COMPLICATION, WITHOUT LONG-TERM CURRENT USE OF INSULIN (HCC): ICD-10-CM

## 2018-07-02 DIAGNOSIS — E11.9 CONTROLLED TYPE 2 DIABETES MELLITUS WITHOUT COMPLICATION, WITHOUT LONG-TERM CURRENT USE OF INSULIN (HCC): ICD-10-CM

## 2018-07-02 DIAGNOSIS — Z12.5 SCREENING FOR PROSTATE CANCER: ICD-10-CM

## 2018-07-02 PROBLEM — M75.21 BICEPS TENDINITIS OF RIGHT UPPER EXTREMITY: Status: ACTIVE | Noted: 2017-09-19

## 2018-07-02 PROCEDURE — 99214 OFFICE O/P EST MOD 30 MIN: CPT | Performed by: FAMILY MEDICINE

## 2018-07-02 RX ORDER — HYDROCODONE BITARTRATE AND ACETAMINOPHEN 5; 325 MG/1; MG/1
1 TABLET ORAL EVERY 6 HOURS PRN
Qty: 15 TABLET | Refills: 0 | Status: SHIPPED | OUTPATIENT
Start: 2018-07-02 | End: 2019-01-30 | Stop reason: SDUPTHER

## 2018-07-02 RX ORDER — CYCLOBENZAPRINE HCL 10 MG
10 TABLET ORAL 3 TIMES DAILY PRN
Qty: 30 TABLET | Refills: 0 | Status: SHIPPED | OUTPATIENT
Start: 2018-07-02 | End: 2019-01-30 | Stop reason: SDUPTHER

## 2018-07-02 RX ORDER — GLIMEPIRIDE 1 MG/1
1 TABLET ORAL 2 TIMES DAILY
Qty: 180 TABLET | Refills: 1 | Status: SHIPPED | OUTPATIENT
Start: 2018-07-02 | End: 2018-07-30 | Stop reason: SDUPTHER

## 2018-07-02 RX ORDER — ATENOLOL 25 MG/1
1 TABLET ORAL DAILY
Refills: 1 | COMMUNITY
Start: 2018-06-23

## 2018-07-02 RX ORDER — SAXAGLIPTIN AND METFORMIN HYDROCHLORIDE 5; 1000 MG/1; MG/1
1 TABLET, FILM COATED, EXTENDED RELEASE ORAL DAILY
Qty: 90 TABLET | Refills: 1 | Status: SHIPPED | OUTPATIENT
Start: 2018-07-02 | End: 2018-07-30 | Stop reason: SDUPTHER

## 2018-07-02 RX ORDER — SAXAGLIPTIN AND METFORMIN HYDROCHLORIDE 5; 1000 MG/1; MG/1
1 TABLET, FILM COATED, EXTENDED RELEASE ORAL DAILY
Refills: 1 | COMMUNITY
Start: 2018-06-02 | End: 2018-07-02 | Stop reason: SDUPTHER

## 2018-07-02 NOTE — PROGRESS NOTES
Assessment/Plan:     Diagnoses and all orders for this visit:    Low back pain without sciatica, unspecified back pain laterality, unspecified chronicity  -     HYDROcodone-acetaminophen (NORCO) 5-325 mg per tablet; Take 1 tablet by mouth every 6 (six) hours as needed for pain Max Daily Amount: 4 tablets  -     cyclobenzaprine (FLEXERIL) 10 mg tablet; Take 1 tablet (10 mg total) by mouth 3 (three) times a day as needed for muscle spasms    Controlled type 2 diabetes mellitus without complication, without long-term current use of insulin ([facility])  -     KOMBIGLYZE XR 5-1000 MG TB24; Take 1 tablet by mouth daily  -     Hemoglobin A1C; Future  -     Microalbumin / creatinine urine ratio; Future    Type 2 diabetes mellitus with complication, without long-term current use of insulin ([facility])  -     glimepiride (AMARYL) 1 mg tablet; Take 1 tablet (1 mg total) by mouth 2 (two) times a day    Screening for prostate cancer  -     PSA, Total Screen; Future    Other orders  -     Discontinue: KOMBIGLYZE XR 5-1000 MG TB24; Take 1 tablet by mouth daily  -     atenolol (TENORMIN) 25 mg tablet; Take 1 tablet by mouth daily      A1c PSA and microalbumin ordered  Continue current medications  Discussed patient's breathing he has wheezing due to his COPD but he reports no acute complaints and feels comfortable with his breathing at this time  He will follow up in 6 months or as needed      Subjective:     Chief Complaint   Patient presents with    Follow-up     6 month checkup        Patient ID: Glenna Crespo is a 58 y o  male      Patient is here for six-month checkup of chronic conditions  He needs refills of his diabetic medication  He reports no acute complaints  He states he had a couple episodes of low blood sugars but that has been improved lately  He has a small patch of itchy skin on his anterior shin  Otherwise he has no complaints        The following portions of the patient's history were reviewed and updated as appropriate: allergies, current medications, past family history, past medical history, past social history, past surgical history and problem list     Review of Systems   Constitutional: Negative  HENT: Negative  Eyes: Negative  Respiratory: Negative  Cardiovascular: Negative  Gastrointestinal: Negative  Endocrine: Negative  Genitourinary: Negative  Musculoskeletal: Negative  Skin: Negative  Allergic/Immunologic: Negative  Neurological: Negative  Hematological: Negative  Psychiatric/Behavioral: Negative  All other systems reviewed and are negative  Objective:    Vitals:    07/02/18 1457   BP: 102/60   BP Location: Left arm   Patient Position: Sitting   Cuff Size: Standard   Pulse: 72   Weight: 110 kg (243 lb)   Height: 5' 11" (1 803 m)          Physical Exam   Constitutional: He is oriented to person, place, and time  He appears well-developed and well-nourished  No distress  HENT:   Head: Normocephalic  Right Ear: External ear normal    Left Ear: External ear normal    Nose: Nose normal    Mouth/Throat: Oropharynx is clear and moist    Eyes: Conjunctivae and EOM are normal  Pupils are equal, round, and reactive to light  Right eye exhibits no discharge  Left eye exhibits no discharge  Neck: Normal range of motion  Cardiovascular: Normal rate, regular rhythm and normal heart sounds  Pulses are no weak pulses  Pulses:       Dorsalis pedis pulses are 2+ on the right side, and 2+ on the left side  Posterior tibial pulses are 2+ on the right side, and 2+ on the left side  Pulmonary/Chest: Effort normal  He has wheezes  Abdominal: Soft  Bowel sounds are normal  He exhibits no distension  There is no tenderness  Musculoskeletal: Normal range of motion  Feet:   Right Foot:   Skin Integrity: Negative for ulcer, skin breakdown, erythema, warmth, callus or dry skin     Left Foot:   Skin Integrity: Negative for ulcer, skin breakdown, erythema, warmth, callus or dry skin    Neurological: He is alert and oriented to person, place, and time  No cranial nerve deficit  Skin: Skin is warm and dry  No rash noted  Psychiatric: He has a normal mood and affect  His behavior is normal  Judgment and thought content normal    Nursing note and vitals reviewed  Patient's shoes and socks removed  Right Foot/Ankle   Right Foot Inspection  Skin Exam: skin normal skin not intact, no dry skin, no warmth, no callus, no erythema, no maceration, no abnormal color, no pre-ulcer, no ulcer and no callus                          Toe Exam: ROM and strength within normal limits  Sensory   Vibration: intact  Proprioception: intact   Monofilament testing: intact  Vascular  Capillary refills: < 3 seconds  The right DP pulse is 2+  The right PT pulse is 2+  Left Foot/Ankle  Left Foot Inspection  Skin Exam: skin normalskin not intact, no dry skin, no warmth, no erythema, no maceration, normal color, no pre-ulcer, no ulcer and no callus                         Toe Exam: ROM and strength within normal limits                   Sensory   Vibration: intact  Proprioception: intact  Monofilament: intact  Vascular  Capillary refills: < 3 seconds  The left DP pulse is 2+  The left PT pulse is 2+  Assign Risk Category:  No deformity present; No loss of protective sensation;  No weak pulses       Risk: 0

## 2018-07-23 ENCOUNTER — HOSPITAL ENCOUNTER (OUTPATIENT)
Dept: SLEEP CENTER | Facility: HOSPITAL | Age: 63
Discharge: HOME | End: 2018-07-23
Attending: INTERNAL MEDICINE
Payer: COMMERCIAL

## 2018-07-23 DIAGNOSIS — G47.33 OBSTRUCTIVE SLEEP APNEA: ICD-10-CM

## 2018-07-23 PROCEDURE — 95811 POLYSOM 6/>YRS CPAP 4/> PARM: CPT

## 2018-07-24 NOTE — PROGRESS NOTES
Sleep Study Documentation    Pre-Sleep Study       Sleep testing procedure explained to patient:YES    Patient napped prior to study:NO    Caffeine:Dayshift worker after 12PM   Caffeine use:YES- tea  6 to 18 ounces    Alcohol:Dayshift workers after 5PM: Alcohol use:NO    Typical day for patient:YES       Study Documentation  Treatment   Optimal PAP pressure: 20/14  Leak:Small  Snore:Eliminated  REM Obtained:yes  Supplemental O2: no    Minimum SaO2 83  Baseline SaO2 93  PAP mask tried (list all) Resmed Airfit N20  PAP mask choice (final) Resmed Airfit N20  PAP mask type:full face  PAP pressure at which snoring was eliminated 16/10  Minimum SaO2 at final PAP pressure   Mode of Therapy:BiPAP  ETCO2:No  CPAP changed to BiPAP:    Mode of Therapy:    EKG abnormalities: no     EEG abnormalities: no    Study Terminated:no    Patient classification: employed       Post-Sleep Study    Medication used at bedtime or during sleep study:YES other prescription medications    Patient reports time it took to fall asleep:less than 20 minutes    Patient reports waking up during study:1 to 2 times  Patient reports returning to sleep without difficulty  Patient reports sleeping 6 to 8 hours with dreaming  Patient reports sleep during study:typical    Patient rated sleepiness: Not sleepy or tired    PAP treatment:yes: Post PAP treatment patient reports feeling better and  would wear PAP mask at home

## 2018-07-30 DIAGNOSIS — E11.9 CONTROLLED TYPE 2 DIABETES MELLITUS WITHOUT COMPLICATION, WITHOUT LONG-TERM CURRENT USE OF INSULIN (HCC): ICD-10-CM

## 2018-07-30 DIAGNOSIS — G47.33 OSA (OBSTRUCTIVE SLEEP APNEA): Primary | ICD-10-CM

## 2018-07-30 DIAGNOSIS — E11.8 TYPE 2 DIABETES MELLITUS WITH COMPLICATION, WITHOUT LONG-TERM CURRENT USE OF INSULIN (HCC): ICD-10-CM

## 2018-07-30 RX ORDER — SAXAGLIPTIN AND METFORMIN HYDROCHLORIDE 5; 1000 MG/1; MG/1
1 TABLET, FILM COATED, EXTENDED RELEASE ORAL DAILY
Qty: 90 TABLET | Refills: 1 | Status: SHIPPED | OUTPATIENT
Start: 2018-07-30 | End: 2018-10-03 | Stop reason: SDUPTHER

## 2018-07-30 RX ORDER — GLIMEPIRIDE 1 MG/1
1 TABLET ORAL 2 TIMES DAILY
Qty: 180 TABLET | Refills: 1 | Status: SHIPPED | OUTPATIENT
Start: 2018-07-30 | End: 2019-08-07 | Stop reason: SDUPTHER

## 2018-07-31 ENCOUNTER — TELEPHONE (OUTPATIENT)
Dept: SLEEP CENTER | Facility: CLINIC | Age: 63
End: 2018-07-31

## 2018-07-31 NOTE — TELEPHONE ENCOUNTER
Spoke with patient regarding pressure change  He states he also needs suppllies  Rx to [facility]   Scheduled compliance follow up for 10/2 @ 1:15 in [place]

## 2018-09-21 ENCOUNTER — TRANSCRIBE ORDERS (OUTPATIENT)
Dept: ADMINISTRATIVE | Facility: HOSPITAL | Age: 63
End: 2018-09-21

## 2018-09-21 DIAGNOSIS — J43.9 EMPHYSEMATOUS BLEB (HCC): ICD-10-CM

## 2018-09-21 DIAGNOSIS — J30.9 ALLERGIC RHINITIS, UNSPECIFIED SEASONALITY, UNSPECIFIED TRIGGER: ICD-10-CM

## 2018-09-21 DIAGNOSIS — R59.0 BILATERAL HILAR ADENOPATHY SYNDROME: Primary | ICD-10-CM

## 2018-09-22 ENCOUNTER — APPOINTMENT (OUTPATIENT)
Dept: LAB | Facility: CLINIC | Age: 63
End: 2018-09-22
Payer: COMMERCIAL

## 2018-09-22 DIAGNOSIS — Z12.5 SCREENING FOR PROSTATE CANCER: ICD-10-CM

## 2018-09-22 DIAGNOSIS — E11.9 CONTROLLED TYPE 2 DIABETES MELLITUS WITHOUT COMPLICATION, WITHOUT LONG-TERM CURRENT USE OF INSULIN (HCC): ICD-10-CM

## 2018-09-22 LAB
EST. AVERAGE GLUCOSE BLD GHB EST-MCNC: 154 MG/DL
HBA1C MFR BLD: 7 % (ref 4.2–6.3)
PSA SERPL-MCNC: 0.5 NG/ML (ref 0–4)

## 2018-09-22 PROCEDURE — 83036 HEMOGLOBIN GLYCOSYLATED A1C: CPT

## 2018-09-22 PROCEDURE — 82570 ASSAY OF URINE CREATININE: CPT

## 2018-09-22 PROCEDURE — G0103 PSA SCREENING: HCPCS

## 2018-09-22 PROCEDURE — 36415 COLL VENOUS BLD VENIPUNCTURE: CPT

## 2018-09-22 PROCEDURE — 82043 UR ALBUMIN QUANTITATIVE: CPT

## 2018-09-25 ENCOUNTER — HOSPITAL ENCOUNTER (OUTPATIENT)
Dept: CT IMAGING | Facility: CLINIC | Age: 63
Discharge: HOME | End: 2018-09-25
Payer: COMMERCIAL

## 2018-09-25 DIAGNOSIS — J43.9 EMPHYSEMATOUS BLEB (HCC): ICD-10-CM

## 2018-09-25 DIAGNOSIS — R59.0 BILATERAL HILAR ADENOPATHY SYNDROME: ICD-10-CM

## 2018-09-25 DIAGNOSIS — J30.9 ALLERGIC RHINITIS, UNSPECIFIED SEASONALITY, UNSPECIFIED TRIGGER: ICD-10-CM

## 2018-09-25 PROCEDURE — 71250 CT THORAX DX C-: CPT

## 2018-10-01 ENCOUNTER — TELEPHONE (OUTPATIENT)
Dept: FAMILY MEDICINE CLINIC | Facility: CLINIC | Age: 63
End: 2018-10-01

## 2018-10-01 DIAGNOSIS — E11.8 TYPE 2 DIABETES MELLITUS WITH COMPLICATION, WITHOUT LONG-TERM CURRENT USE OF INSULIN (HCC): Primary | ICD-10-CM

## 2018-10-01 NOTE — TELEPHONE ENCOUNTER
KOMBIGLYZE XR 5-1000 MG TB24    Needs preauthorization per patient the # to call is [phone]  Then let     [facility] KNOW    [phone] ANY QUESTIONS

## 2018-10-01 NOTE — TELEPHONE ENCOUNTER
We will need to switch him to the components  Metformin 1000 mg BID  And onglyza 5 mg BID  Please order those

## 2018-10-02 ENCOUNTER — OFFICE VISIT (OUTPATIENT)
Dept: SLEEP CENTER | Facility: HOSPITAL | Age: 63
End: 2018-10-02
Payer: COMMERCIAL

## 2018-10-02 ENCOUNTER — CLINICAL SUPPORT (OUTPATIENT)
Dept: FAMILY MEDICINE CLINIC | Facility: CLINIC | Age: 63
End: 2018-10-02
Payer: COMMERCIAL

## 2018-10-02 ENCOUNTER — APPOINTMENT (OUTPATIENT)
Dept: LAB | Facility: CLINIC | Age: 63
End: 2018-10-02
Payer: COMMERCIAL

## 2018-10-02 ENCOUNTER — TRANSCRIBE ORDERS (OUTPATIENT)
Dept: ADMINISTRATIVE | Facility: HOSPITAL | Age: 63
End: 2018-10-02

## 2018-10-02 VITALS
SYSTOLIC BLOOD PRESSURE: 108 MMHG | DIASTOLIC BLOOD PRESSURE: 68 MMHG | HEART RATE: 66 BPM | HEIGHT: 71 IN | WEIGHT: 246 LBS | BODY MASS INDEX: 34.44 KG/M2

## 2018-10-02 DIAGNOSIS — Z23 NEED FOR INFLUENZA VACCINATION: Primary | ICD-10-CM

## 2018-10-02 DIAGNOSIS — I10 HYPERTENSION, ESSENTIAL: ICD-10-CM

## 2018-10-02 DIAGNOSIS — J44.0 COPD (CHRONIC OBSTRUCTIVE PULMONARY DISEASE) WITH ACUTE BRONCHITIS (HCC): ICD-10-CM

## 2018-10-02 DIAGNOSIS — G47.33 OBSTRUCTIVE SLEEP APNEA: Primary | ICD-10-CM

## 2018-10-02 DIAGNOSIS — R45.86 MOOD DISTURBANCE: ICD-10-CM

## 2018-10-02 DIAGNOSIS — G25.81 RESTLESS LEG SYNDROME: ICD-10-CM

## 2018-10-02 DIAGNOSIS — E78.2 MIXED HYPERLIPIDEMIA: ICD-10-CM

## 2018-10-02 DIAGNOSIS — R73.09 OTHER ABNORMAL GLUCOSE: ICD-10-CM

## 2018-10-02 DIAGNOSIS — E78.5 HYPERLIPIDEMIA, UNSPECIFIED HYPERLIPIDEMIA TYPE: ICD-10-CM

## 2018-10-02 DIAGNOSIS — I10 ESSENTIAL HYPERTENSION, MALIGNANT: Primary | ICD-10-CM

## 2018-10-02 DIAGNOSIS — J20.9 COPD (CHRONIC OBSTRUCTIVE PULMONARY DISEASE) WITH ACUTE BRONCHITIS (HCC): ICD-10-CM

## 2018-10-02 DIAGNOSIS — E66.9 OBESITY (BMI 30-39.9): ICD-10-CM

## 2018-10-02 LAB
ALBUMIN SERPL BCP-MCNC: 3.6 G/DL (ref 3.5–5)
ALP SERPL-CCNC: 55 U/L (ref 46–116)
ALT SERPL W P-5'-P-CCNC: 30 U/L (ref 12–78)
ANION GAP SERPL CALCULATED.3IONS-SCNC: 5 MMOL/L (ref 4–13)
AST SERPL W P-5'-P-CCNC: 17 U/L (ref 5–45)
BASOPHILS # BLD AUTO: 0.08 THOUSANDS/ΜL (ref 0–0.1)
BASOPHILS NFR BLD AUTO: 1 % (ref 0–1)
BILIRUB DIRECT SERPL-MCNC: 0.18 MG/DL (ref 0–0.2)
BILIRUB SERPL-MCNC: 0.48 MG/DL (ref 0.2–1)
BUN SERPL-MCNC: 16 MG/DL (ref 5–25)
CALCIUM SERPL-MCNC: 9.1 MG/DL (ref 8.3–10.1)
CHLORIDE SERPL-SCNC: 104 MMOL/L (ref 100–108)
CHOLEST SERPL-MCNC: 93 MG/DL (ref 50–200)
CK SERPL-CCNC: 74 U/L (ref 39–308)
CO2 SERPL-SCNC: 25 MMOL/L (ref 21–32)
CREAT SERPL-MCNC: 0.96 MG/DL (ref 0.6–1.3)
CREAT UR-MCNC: 158 MG/DL
EOSINOPHIL # BLD AUTO: 0.32 THOUSAND/ΜL (ref 0–0.61)
EOSINOPHIL NFR BLD AUTO: 3 % (ref 0–6)
ERYTHROCYTE [DISTWIDTH] IN BLOOD BY AUTOMATED COUNT: 12.7 % (ref 11.6–15.1)
EST. AVERAGE GLUCOSE BLD GHB EST-MCNC: 151 MG/DL
GFR SERPL CREATININE-BSD FRML MDRD: 84 ML/MIN/1.73SQ M
GLUCOSE P FAST SERPL-MCNC: 104 MG/DL (ref 65–99)
HBA1C MFR BLD: 6.9 % (ref 4.2–6.3)
HCT VFR BLD AUTO: 43.8 % (ref 36.5–49.3)
HDLC SERPL-MCNC: 34 MG/DL (ref 40–60)
HGB BLD-MCNC: 14.5 G/DL (ref 12–17)
IMM GRANULOCYTES # BLD AUTO: 0.04 THOUSAND/UL (ref 0–0.2)
IMM GRANULOCYTES NFR BLD AUTO: 0 % (ref 0–2)
LDLC SERPL CALC-MCNC: 36 MG/DL (ref 0–100)
LYMPHOCYTES # BLD AUTO: 2.5 THOUSANDS/ΜL (ref 0.6–4.47)
LYMPHOCYTES NFR BLD AUTO: 25 % (ref 14–44)
MCH RBC QN AUTO: 31.3 PG (ref 26.8–34.3)
MCHC RBC AUTO-ENTMCNC: 33.1 G/DL (ref 31.4–37.4)
MCV RBC AUTO: 94 FL (ref 82–98)
MICROALBUMIN UR-MCNC: 23.6 MG/L (ref 0–20)
MICROALBUMIN/CREAT 24H UR: 15 MG/G CREATININE (ref 0–30)
MONOCYTES # BLD AUTO: 0.58 THOUSAND/ΜL (ref 0.17–1.22)
MONOCYTES NFR BLD AUTO: 6 % (ref 4–12)
NEUTROPHILS # BLD AUTO: 6.38 THOUSANDS/ΜL (ref 1.85–7.62)
NEUTS SEG NFR BLD AUTO: 65 % (ref 43–75)
NRBC BLD AUTO-RTO: 0 /100 WBCS
PLATELET # BLD AUTO: 169 THOUSANDS/UL (ref 149–390)
PMV BLD AUTO: 11 FL (ref 8.9–12.7)
POTASSIUM SERPL-SCNC: 4.1 MMOL/L (ref 3.5–5.3)
PROT SERPL-MCNC: 7.6 G/DL (ref 6.4–8.2)
RBC # BLD AUTO: 4.64 MILLION/UL (ref 3.88–5.62)
SODIUM SERPL-SCNC: 134 MMOL/L (ref 136–145)
TRIGL SERPL-MCNC: 116 MG/DL
TSH SERPL DL<=0.05 MIU/L-ACNC: 2.05 UIU/ML (ref 0.36–3.74)
WBC # BLD AUTO: 9.9 THOUSAND/UL (ref 4.31–10.16)

## 2018-10-02 PROCEDURE — 80061 LIPID PANEL: CPT

## 2018-10-02 PROCEDURE — 90682 RIV4 VACC RECOMBINANT DNA IM: CPT

## 2018-10-02 PROCEDURE — 99214 OFFICE O/P EST MOD 30 MIN: CPT | Performed by: INTERNAL MEDICINE

## 2018-10-02 PROCEDURE — 80048 BASIC METABOLIC PNL TOTAL CA: CPT

## 2018-10-02 PROCEDURE — 36415 COLL VENOUS BLD VENIPUNCTURE: CPT

## 2018-10-02 PROCEDURE — 83036 HEMOGLOBIN GLYCOSYLATED A1C: CPT

## 2018-10-02 PROCEDURE — 82550 ASSAY OF CK (CPK): CPT

## 2018-10-02 PROCEDURE — 90471 IMMUNIZATION ADMIN: CPT

## 2018-10-02 PROCEDURE — 80076 HEPATIC FUNCTION PANEL: CPT

## 2018-10-02 PROCEDURE — 84443 ASSAY THYROID STIM HORMONE: CPT

## 2018-10-02 PROCEDURE — 85025 COMPLETE CBC W/AUTO DIFF WBC: CPT

## 2018-10-02 NOTE — PROGRESS NOTES
Review of Systems      Genitourinary none   Cardiology none   Gastrointestinal none   Neurology none   Constitutional none   Integumentary none   Psychiatry none   Musculoskeletal none   Pulmonary none   ENT throat clearing   Endocrine none   Hematological none

## 2018-10-02 NOTE — PROGRESS NOTES
Follow-Up Note - Sleep Center   Johnnie Costello  61 y o  male  :1955  TYM:[id number]    CC: I saw this patient for follow-up in clinic today for his Sleep Disordered Breathing, Coexisting Sleep and Medical Problems  A sleep study to re-titrate Positive airway pressure (PAP) therapy was undertaken  Patient got a replacement BiPAP machine a few months ago is here to review results and to adjust therapy  The study demonstrated sleep disordered breathing was successfully remediated with PAP at 20/14 cm H2O  Mean oxygen saturation was 91% and there were some desaturations to a sonu of 86%  PFSH, Problem List, Medications & Allergies were reviewed in EMR  Interval changes: none reported  He  has a past medical history of Old myocardial infarction and Tear of medial meniscus of left knee, subsequent encounter  He has a current medication list which includes the following prescription(s): atenolol, clopidogrel, cyclobenzaprine, ezetimibe-simvastatin, glimepiride, hydrocodone-acetaminophen, kombiglyze xr, metformin, quetiapine, and saxagliptin  ROS: Reviewed (see attached)  Mood is stable on current medication  Medical conditions are controlled  HPI:  With respect to compliance, data download shows:  using PAP > 4 hours/night 80% of the time  BENJA (estimated) 0 6/hour at  pressure of 20/14cm H2O  Jayde Marti reports  · no  difficulty tolerating PAP;   · minor adverse effects: mask leaks   · Benefitting from use: sleeping better   · He has not been experiencing restless leg symptoms or jerking movements during sleep  Sleep Routine: He reports getting sleeping 8 hr sleep; he has no difficulty initiating or maintaining sleep   He awakens with the aid of an alarm feeling refreshed  He denied excessive drowsiness   He rated himself at Total score:  on the [place] sleepiness scale  Habits: reports that he quit smoking about 5 years ago   He has never used smokeless tobacco ,  reports that he does not drink alcohol ,  reports that he uses drugs, including Marijuana  , Caffeine use: limited , Exercise routine: none   EXAM: /68   Pulse 66   Ht 5' 11" (1 803 m)   Wt 112 kg (246 lb)   BMI 34 31 kg/m²      Patient is well groomed; well appearing; no deformity  He is alert, orientated, cooperative and in no distress  Mental state appears normal   Skin/Extrem: warm & dry; col & hydration normal; no edema  EOMI; There are no facial pressure marks or rashes  Neck Circumference: 41 5 cm  Nasal airway is patent  Oral airway is crowded  Mucous membranes appeared normal  RRR; Resp effort is normal  There is truncal obesity  Gait & Balance normal   Speech is clear & coherent  IMPRESSION: Primary/secondary Sleep conditions (to Medical or psychiatric) & comorbidities   1  Obstructive sleep apnea  PAP DME Resupply/Reorder   2  COPD (chronic obstructive pulmonary disease) with acute bronchitis ([facility] [address] )     3  Restless leg syndrome     4  Obesity (BMI 30-39 9)     5  Mood disturbance      Comorbidities:  Hypertension, coronary artery disease and diabetes     PLAN:  1  Treatment with  PAP is medically necessary and Lashawn Bernabe is agreable to continue use  2  We discussed methods to improve comfort using PAP, care of equipment, and importance of compliance with therapy  3  Pressure settin/14 H2O     4  Rx provided to replace supplies and Care coordinated with DME provider  5  Strategies for weight reduction were discussed  6  Follow-up is advised in 1 year or sooner if needed to monitor progress, compliance and to adjust therapy  Thank you for allowing me to participate in the care of this patient      Sincerely,    Authenticated electronically by Evelia Díaz MD on    Board Certified Specialist

## 2018-10-03 ENCOUNTER — TELEPHONE (OUTPATIENT)
Dept: FAMILY MEDICINE CLINIC | Facility: CLINIC | Age: 63
End: 2018-10-03

## 2018-10-03 DIAGNOSIS — E11.9 CONTROLLED TYPE 2 DIABETES MELLITUS WITHOUT COMPLICATION, WITHOUT LONG-TERM CURRENT USE OF INSULIN (HCC): ICD-10-CM

## 2018-10-03 NOTE — TELEPHONE ENCOUNTER
Patient stated that his insurance had authorized the Kombiglyze XR 5-1000 mg tab  The other two prescriptions that were put in its place can be cancelled  That is the metformin and saxagliptin  Any questions he can be reached at [phone]

## 2018-10-11 ENCOUNTER — OFFICE VISIT (OUTPATIENT)
Dept: OBGYN CLINIC | Facility: CLINIC | Age: 63
End: 2018-10-11
Payer: COMMERCIAL

## 2018-10-11 ENCOUNTER — APPOINTMENT (OUTPATIENT)
Dept: RADIOLOGY | Facility: CLINIC | Age: 63
End: 2018-10-11
Payer: OTHER MISCELLANEOUS

## 2018-10-11 VITALS
WEIGHT: 244.8 LBS | SYSTOLIC BLOOD PRESSURE: 116 MMHG | DIASTOLIC BLOOD PRESSURE: 77 MMHG | HEART RATE: 54 BPM | BODY MASS INDEX: 34.27 KG/M2 | HEIGHT: 71 IN

## 2018-10-11 DIAGNOSIS — M25.529 ELBOW PAIN, UNSPECIFIED LATERALITY: ICD-10-CM

## 2018-10-11 DIAGNOSIS — M77.12 LATERAL EPICONDYLITIS OF BOTH ELBOWS: Primary | ICD-10-CM

## 2018-10-11 DIAGNOSIS — M77.11 LATERAL EPICONDYLITIS OF BOTH ELBOWS: Primary | ICD-10-CM

## 2018-10-11 PROCEDURE — 99213 OFFICE O/P EST LOW 20 MIN: CPT | Performed by: ORTHOPAEDIC SURGERY

## 2018-10-11 PROCEDURE — 73080 X-RAY EXAM OF ELBOW: CPT

## 2018-10-11 RX ORDER — SAXAGLIPTIN AND METFORMIN HYDROCHLORIDE 5; 1000 MG/1; MG/1
1 TABLET, FILM COATED, EXTENDED RELEASE ORAL DAILY
Qty: 90 TABLET | Refills: 2 | Status: SHIPPED | OUTPATIENT
Start: 2018-10-11 | End: 2019-01-07 | Stop reason: SDUPTHER

## 2018-10-11 NOTE — ASSESSMENT & PLAN NOTE
59-year-old male with bilateral lateral epicondylitis  I discussed the diagnosis with him  I recommended tennis elbow straps and gave him stretches to do  If he fails to have improvement in the next 3-4 weeks he will begin physical therapy  Will see him back in six weeks  He was given a note for work with no restrictions  I have encouraged him to have other people do as much lifting as possible

## 2018-10-11 NOTE — PROGRESS NOTES
Assessment:     1  Lateral epicondylitis of both elbows          Plan:     Problem List Items Addressed This Visit        Musculoskeletal and Integument    Lateral epicondylitis of both elbows - Primary     78-year-old male with bilateral lateral epicondylitis  I discussed the diagnosis with him  I recommended tennis elbow straps and gave him stretches to do  If he fails to have improvement in the next 3-4 weeks he will begin physical therapy  Will see him back in six weeks  He was given a note for work with no restrictions  I have encouraged him to have other people do as much lifting as possible  Relevant Orders    XR elbow 3+ vw left    XR elbow 3+ vw right    Tennis elbow strap    Ambulatory referral to Occupational Therapy           Patient ID: Lexi Alvarado is a 61 y o  male  Chief Complaint:  Bilateral elbow pain    Subjective:  78-year-old male who was lifting 5 gal buckets that were much crowe than he expected  He grabbed one with each of his hands simultaneously and had immediate pain in his elbows  He care them for about 6 ft before putting them into his truck  This was on August 28, 2018  Since that time he has had some mild improvement  He has pain if he lifts something  If he is reaching out to grab a cup of coffee he has difficulty doing that because of the pain  He denies any numbness tingling or swelling of the hand or fingers  He gets a rare burning sensation in the area  The left is slightly worse than the right  He is right-hand dominant  He has never had this problem before  Allergy:  Allergies   Allergen Reactions    Penicillins Other (See Comments)     hives-emily cefazolin       Medications:  all current active meds have been reviewed    ROS:  Review of Systems   Musculoskeletal: Positive for arthralgias         Objective:  BP Readings from Last 1 Encounters:   10/11/18 116/77      Wt Readings from Last 1 Encounters:   10/11/18 111 kg (244 lb 12 8 oz) Exam:   Physical Exam  Right Elbow Exam     Comments:  Range of motion is 5-130, no swelling, tender to palpation of the lateral epicondyle, full pronation, and supination  EPL, FPL, abbuction, abduction are intact, sensation is intact to light touch of the median, radial, and ulnar nerve distribution, there is brisk cap refill of the fingers, there is a palpable radial pulse, negative Tinel's over the cubital tunnel, stable to varus and valgus stress      Left Elbow Exam     Comments:  Range of motion is 0-130, no swelling, tender to palpation of the lateral epicondyle, full pronation, and supination  EPL, FPL, abbuction, abduction are intact, sensation is intact to light touch of the median, radial, and ulnar nerve distribution, there is brisk cap refill of the fingers, there is a palpable radial pulse, negative Tinel's over the cubital tunnel, stable to varus and valgus stress              Radiographs:  I have personally reviewed pertinent films in PACS and my interpretation is X-rays show well maintained elbow joint, no fractures or dislocations

## 2018-10-11 NOTE — LETTER
October 11, 2018     Patient: John Pichardo   YOB: 1955   Date of Visit: 10/11/2018       To Whom it May Concern:    John Pichardo is under my professional care  He was seen in my office on 10/11/2018  He may return to work on 10/11/18  If you have any questions or concerns, please don't hesitate to call           Sincerely,          Candy Marcus MD        CC: No Recipients

## 2018-11-15 ENCOUNTER — TRANSCRIBE ORDERS (OUTPATIENT)
Dept: ADMINISTRATIVE | Facility: HOSPITAL | Age: 63
End: 2018-11-15

## 2018-11-15 DIAGNOSIS — I25.10 ATHEROSCLEROSIS OF NATIVE CORONARY ARTERY WITHOUT ANGINA PECTORIS, UNSPECIFIED WHETHER NATIVE OR TRANSPLANTED HEART: Primary | ICD-10-CM

## 2018-11-21 ENCOUNTER — OFFICE VISIT (OUTPATIENT)
Dept: OBGYN CLINIC | Facility: CLINIC | Age: 63
End: 2018-11-21
Payer: OTHER MISCELLANEOUS

## 2018-11-21 VITALS
SYSTOLIC BLOOD PRESSURE: 124 MMHG | HEIGHT: 71 IN | DIASTOLIC BLOOD PRESSURE: 80 MMHG | BODY MASS INDEX: 34.72 KG/M2 | WEIGHT: 248 LBS | HEART RATE: 82 BPM

## 2018-11-21 DIAGNOSIS — M77.11 LATERAL EPICONDYLITIS OF BOTH ELBOWS: Primary | ICD-10-CM

## 2018-11-21 DIAGNOSIS — M77.12 LATERAL EPICONDYLITIS OF BOTH ELBOWS: Primary | ICD-10-CM

## 2018-11-21 PROCEDURE — 99213 OFFICE O/P EST LOW 20 MIN: CPT | Performed by: ORTHOPAEDIC SURGERY

## 2018-11-21 NOTE — ASSESSMENT & PLAN NOTE
80-year-old male with bilateral lateral epicondylitis  Given the fact that he has had minimal improvement on his left elbow I have recommended occupational therapy for him  Continue working with no restrictions  We discussed again the importance of regular stretching and how to do it  I will see him back in 6-8 weeks

## 2018-11-21 NOTE — PROGRESS NOTES
Assessment:     1  Lateral epicondylitis of both elbows          Plan:     Problem List Items Addressed This Visit        Musculoskeletal and Integument    Lateral epicondylitis of both elbows - Primary     79-year-old male with bilateral lateral epicondylitis  Given the fact that he has had minimal improvement on his left elbow I have recommended occupational therapy for him  Continue working with no restrictions  We discussed again the importance of regular stretching and how to do it  I will see him back in 6-8 weeks  Relevant Orders    Ambulatory referral to Occupational Therapy           Patient ID: Keven Goode is a 61 y o  male  Chief Complaint:  F/u elbow pain    Subjective:  79-year-old male who was lifting 5 gal buckets that were much crowe than he expected  He grabbed one with each of his hands simultaneously and had immediate pain in his elbows  He care them for about 6 ft before putting them into his truck  This was on August 28, 2018  Since that time he has had some mild improvement  He has pain if he lifts something  If he is reaching out to grab a cup of coffee he has difficulty doing that because of the pain  He denies any numbness tingling or swelling of the hand or fingers  He gets a rare burning sensation in the area  He has been wearing the counterforce braces since his last visit  The right he notes is significantly improved, the left is about the same  Continues have pain and soreness in the lateral aspect of his elbow area  It bothers him a lot at night when he is trying to sleep  He has been trying to do the exercises, but when he shows me how he does stretches he is not doing them correctly  Allergy:  Allergies   Allergen Reactions    Penicillins Other (See Comments)     hives-emily cefazolin       Medications:  all current active meds have been reviewed    ROS:  Review of Systems   Musculoskeletal: Positive for arthralgias     All other systems reviewed and are negative  Objective:  BP Readings from Last 1 Encounters:   11/21/18 124/80      Wt Readings from Last 1 Encounters:   11/21/18 112 kg (248 lb)        Exam:   Physical Exam  Right Elbow Exam     Comments:  Range of motion is 5-130, no swelling, tender to palpation of the lateral epicondyle, full pronation, and supination  Left Elbow Exam     Comments:  Range of motion is 0-130, no swelling, tender to palpation of the lateral epicondyle, full pronation, and supination  Radiographs:  I have personally reviewed pertinent films in PACS and my interpretation is X-rays show well maintained elbow joint, no fractures or dislocations

## 2018-11-30 ENCOUNTER — HOSPITAL ENCOUNTER (OUTPATIENT)
Dept: NON INVASIVE DIAGNOSTICS | Facility: CLINIC | Age: 63
Discharge: HOME | End: 2018-11-30
Payer: COMMERCIAL

## 2018-11-30 DIAGNOSIS — I25.10 ATHEROSCLEROSIS OF NATIVE CORONARY ARTERY WITHOUT ANGINA PECTORIS, UNSPECIFIED WHETHER NATIVE OR TRANSPLANTED HEART: ICD-10-CM

## 2018-11-30 LAB
CHEST PAIN STATEMENT: NORMAL
MAX DIASTOLIC BP: 64 MMHG
MAX HEART RATE: 81 BPM
MAX PREDICTED HEART RATE: 157 BPM
MAX. SYSTOLIC BP: 104 MMHG
PROTOCOL NAME: NORMAL
REASON FOR TERMINATION: NORMAL
TARGET HR FORMULA: NORMAL
TEST INDICATION: NORMAL
TIME IN EXERCISE PHASE: NORMAL

## 2018-11-30 PROCEDURE — 93018 CV STRESS TEST I&R ONLY: CPT | Performed by: INTERNAL MEDICINE

## 2018-11-30 PROCEDURE — 93017 CV STRESS TEST TRACING ONLY: CPT

## 2018-11-30 PROCEDURE — 93016 CV STRESS TEST SUPVJ ONLY: CPT | Performed by: INTERNAL MEDICINE

## 2018-11-30 PROCEDURE — 78452 HT MUSCLE IMAGE SPECT MULT: CPT | Performed by: INTERNAL MEDICINE

## 2018-11-30 PROCEDURE — A9502 TC99M TETROFOSMIN: HCPCS

## 2018-11-30 PROCEDURE — 78452 HT MUSCLE IMAGE SPECT MULT: CPT

## 2018-11-30 RX ADMIN — REGADENOSON 0.4 MG: 0.08 INJECTION, SOLUTION INTRAVENOUS at 09:59

## 2018-12-12 ENCOUNTER — EVALUATION (OUTPATIENT)
Dept: OCCUPATIONAL THERAPY | Facility: CLINIC | Age: 63
End: 2018-12-12
Payer: OTHER MISCELLANEOUS

## 2018-12-12 DIAGNOSIS — M77.12 LATERAL EPICONDYLITIS OF BOTH ELBOWS: ICD-10-CM

## 2018-12-12 DIAGNOSIS — M77.11 LATERAL EPICONDYLITIS OF BOTH ELBOWS: ICD-10-CM

## 2018-12-12 PROCEDURE — 97166 OT EVAL MOD COMPLEX 45 MIN: CPT | Performed by: OCCUPATIONAL THERAPIST

## 2018-12-12 PROCEDURE — G8990 OTHER PT/OT CURRENT STATUS: HCPCS | Performed by: OCCUPATIONAL THERAPIST

## 2018-12-12 PROCEDURE — 97140 MANUAL THERAPY 1/> REGIONS: CPT | Performed by: OCCUPATIONAL THERAPIST

## 2018-12-12 PROCEDURE — G8991 OTHER PT/OT GOAL STATUS: HCPCS | Performed by: OCCUPATIONAL THERAPIST

## 2018-12-12 NOTE — PROGRESS NOTES
OT Evaluation     Today's date: 2018  Patient name: Wilber Galloway  : 1955  MRN: [id number]  Referring provider: Ariella Shafer MD  Dx:   Encounter Diagnosis     ICD-10-CM    1  Lateral epicondylitis of both elbows M77 11 Ambulatory referral to Occupational Therapy    M77 12                   Assessment  Assessment details: Pt  Presenting with bilateral lateral epicondylitis with tenderness over lateral epicondyle and pain with gripping and carrying  Pt  To benefit from OT for modalities, manual tx, behavior modifications, splinting and progressive strengthening  Impairments: activity intolerance, impaired physical strength, lacks appropriate home exercise program and pain with function  Functional limitations: Pt  has pain with lifting and gripping, carrying objects  Climbing a ladder at work  Understanding of Dx/Px/POC: good   Prognosis: good    Goals  STG( 6 visits)  1  Compliant with HEP  2  Decrease pain to 3/10 with functional gripping  LTG(12 visits)  1  Improve FOTO score by 20%  2  Decrease pain to 0/10 with functional gripping/lifting      Plan  Patient would benefit from: skilled occupational therapy  Planned modality interventions: ultrasound, thermotherapy: hydrocollator packs and cryotherapy  Planned therapy interventions: home exercise program, graded exercise, therapeutic exercise, stretching, strengthening, patient education, orthotic fitting/training, manual therapy, joint mobilization and activity modification  Frequency: 2x week  Treatment plan discussed with: patient        Subjective Evaluation    History of Present Illness  Onset date: 6 months ago  Mechanism of injury: Pt  Was lifting cement buckets at work and experienced some pain in both arms back in August   He felt like the pain would go away, so he did not treat the initial injury  Pain was worsening and followed up with MD due to pain    He was diagnosed with bilateral lateral epicondylitis and is wearing counterforce straps given to him by the MD   He states his pain is about 50% less than the initial injury and improving  Pain is only with lifting and gripping activities  Quality of life: good    Pain  Current pain ratin  At best pain ratin  At worst pain ratin (5 R  7 L)  Quality: discomfort  Aggravating factors: lifting  Progression: improved    Social Support    Employment status: working ()  Hand dominance: right      Diagnostic Tests  X-ray: normal        Objective     Tenderness     Left Elbow   Tenderness in the lateral epicondyle  Right Elbow   Tenderness in the lateral epicondyle  Left Wrist/Hand   Tenderness in the lateral epicondyle  Right Wrist/Hand   Tenderness in the lateral epicondyle  Neurological Testing     Sensation     Wrist/Hand   Left   Intact: light touch    Right   Intact: light touch    Active Range of Motion     Left Wrist   Normal active range of motion    Right Wrist   Normal active range of motion    Additional Active Range of Motion Details  Full hand ROM B/L    Strength/Myotome Testing     Left Wrist/Hand   Normal wrist strength     (2nd hand position)     Trial 1: 60    Right Wrist/Hand   Normal wrist strength     (2nd hand position)     Trial 1: 80      Flowsheet Rows      Most Recent Value   PT/OT G-Codes   Assessment Type  Evaluation   G code set  Other PT/OT Primary   Other PT Primary Current Status ()  CK   Other PT Primary Goal Status ()  CJ          Precautions: Universal    Daily Treatment Diary     Manual              IASTM lat  Epi B/L 10m            MFR ext   Mass B/L 5m                                                       Exercise Diary              Nirschl stretch B/L             [facility] HEP-Nirschl, behavior mod   reviewed                Modalities  12/12            MHP B/L 10m

## 2018-12-19 ENCOUNTER — APPOINTMENT (OUTPATIENT)
Dept: OCCUPATIONAL THERAPY | Facility: CLINIC | Age: 63
End: 2018-12-19
Payer: OTHER MISCELLANEOUS

## 2019-01-02 ENCOUNTER — OFFICE VISIT (OUTPATIENT)
Dept: OCCUPATIONAL THERAPY | Facility: CLINIC | Age: 64
End: 2019-01-02
Payer: OTHER MISCELLANEOUS

## 2019-01-02 DIAGNOSIS — M77.12 LATERAL EPICONDYLITIS OF BOTH ELBOWS: Primary | ICD-10-CM

## 2019-01-02 DIAGNOSIS — M77.11 LATERAL EPICONDYLITIS OF BOTH ELBOWS: Primary | ICD-10-CM

## 2019-01-02 PROCEDURE — 97010 HOT OR COLD PACKS THERAPY: CPT | Performed by: OCCUPATIONAL THERAPIST

## 2019-01-02 PROCEDURE — 97140 MANUAL THERAPY 1/> REGIONS: CPT | Performed by: OCCUPATIONAL THERAPIST

## 2019-01-02 PROCEDURE — 97110 THERAPEUTIC EXERCISES: CPT | Performed by: OCCUPATIONAL THERAPIST

## 2019-01-02 NOTE — PROGRESS NOTES
Daily Note     Today's date: 2019  Patient name: Tammy Peter  : 1955  MRN: [id number]  Referring provider: An Lizarraga MD  Dx:   Encounter Diagnosis     ICD-10-CM    1  Lateral epicondylitis of both elbows M77 11     M77 12                   Subjective: They are feeling better  Objective: See treatment diary below    Manual             IASTM lat  Epi B/L 10m 10m           MFR ext  Mass B/L 5m 8m                                                      Exercise Diary             Nirschl stretch B/L  x10 each           Ecc Wrsit  #2 3x10           ecc supination  hammer 2x10           Flex   red x20 each                                                                                                                                                                                                              HEP-Nirschl, behavior mod  reviewed                Modalities             MHP B/L 10m 10m                                         Assessment: Tolerated treatment well  Patient having no pain on R side, mild pain L with exercise  Plan: Continue per plan of care

## 2019-01-07 DIAGNOSIS — E11.9 CONTROLLED TYPE 2 DIABETES MELLITUS WITHOUT COMPLICATION, WITHOUT LONG-TERM CURRENT USE OF INSULIN (HCC): ICD-10-CM

## 2019-01-07 RX ORDER — SAXAGLIPTIN AND METFORMIN HYDROCHLORIDE 5; 1000 MG/1; MG/1
1 TABLET, FILM COATED, EXTENDED RELEASE ORAL DAILY
Qty: 90 TABLET | Refills: 1 | Status: SHIPPED | OUTPATIENT
Start: 2019-01-07 | End: 2019-01-23 | Stop reason: SDUPTHER

## 2019-01-10 ENCOUNTER — OFFICE VISIT (OUTPATIENT)
Dept: OBGYN CLINIC | Facility: CLINIC | Age: 64
End: 2019-01-10
Payer: OTHER MISCELLANEOUS

## 2019-01-10 ENCOUNTER — OFFICE VISIT (OUTPATIENT)
Dept: OCCUPATIONAL THERAPY | Facility: CLINIC | Age: 64
End: 2019-01-10
Payer: OTHER MISCELLANEOUS

## 2019-01-10 VITALS
HEART RATE: 74 BPM | HEIGHT: 71 IN | DIASTOLIC BLOOD PRESSURE: 60 MMHG | BODY MASS INDEX: 35.84 KG/M2 | WEIGHT: 256 LBS | SYSTOLIC BLOOD PRESSURE: 110 MMHG

## 2019-01-10 DIAGNOSIS — M77.11 LATERAL EPICONDYLITIS OF BOTH ELBOWS: Primary | ICD-10-CM

## 2019-01-10 DIAGNOSIS — M77.12 LATERAL EPICONDYLITIS OF BOTH ELBOWS: Primary | ICD-10-CM

## 2019-01-10 PROCEDURE — 99213 OFFICE O/P EST LOW 20 MIN: CPT | Performed by: ORTHOPAEDIC SURGERY

## 2019-01-10 PROCEDURE — 97010 HOT OR COLD PACKS THERAPY: CPT | Performed by: OCCUPATIONAL THERAPIST

## 2019-01-10 PROCEDURE — 97140 MANUAL THERAPY 1/> REGIONS: CPT | Performed by: OCCUPATIONAL THERAPIST

## 2019-01-10 PROCEDURE — 97110 THERAPEUTIC EXERCISES: CPT | Performed by: OCCUPATIONAL THERAPIST

## 2019-01-10 NOTE — ASSESSMENT & PLAN NOTE
70-year-old male with bilateral lateral epicondylitis  He shows significant improvement overall  We will have him continue working with occupational therapy  Follow-up in six weeks  No restrictions at work

## 2019-01-10 NOTE — PROGRESS NOTES
Assessment:     1  Lateral epicondylitis of both elbows          Plan:     Problem List Items Addressed This Visit        Musculoskeletal and Integument    Lateral epicondylitis of both elbows - Primary     40-year-old male with bilateral lateral epicondylitis  He shows significant improvement overall  We will have him continue working with occupational therapy  Follow-up in six weeks  No restrictions at work  Relevant Orders    Ambulatory referral to Occupational Therapy           Patient ID: Iris Blount is a 61 y o  male  Chief Complaint:  F/u elbow pain    Subjective:  40-year-old male who was lifting 5 gal buckets that were much crowe than he expected  He grabbed one with each of his hands simultaneously and had immediate pain in his elbows  He care them for about 6 ft before putting them into his truck  This was on August 28, 2018  Since that time he has had some mild improvement  He has pain if he lifts something  If he is reaching out to grab a cup of coffee he has difficulty doing that because of the pain  He denies any numbness tingling or swelling of the hand or fingers  He gets a rare burning sensation in the area  Since his last visit he has been working with occupational therapy  He notes at least 50% improvement bilaterally  The right elbow still has some tenderness to palpation in the left elbow he has pain if he is reaching in getting something heavy out of the refrigerator  Allergy:  Allergies   Allergen Reactions    Penicillins Other (See Comments)     hives-emily cefazolin       Medications:  all current active meds have been reviewed    ROS:  Review of Systems   All other systems reviewed and are negative        Objective:  BP Readings from Last 1 Encounters:   01/10/19 110/60      Wt Readings from Last 1 Encounters:   01/10/19 116 kg (256 lb)        Exam:   Physical Exam  Right Elbow Exam     Comments:  Range of motion is 5-130, no swelling, very minimally tender to palpation of the lateral epicondyle, full pronation, and supination  Tenderness in the extensor muscles        Left Elbow Exam     Comments:  Range of motion is 0-130, no swelling, nontender to palpation of the lateral epicondyle, full pronation, and supination

## 2019-01-10 NOTE — PROGRESS NOTES
Daily Note     Today's date: 1/10/2019  Patient name: Cassi Rodriguez  : 1955  MRN: [id number]  Referring provider: Terra Pereira MD  Dx:   Encounter Diagnosis     ICD-10-CM    1  Lateral epicondylitis of both elbows M77 11     M77 12                   Subjective: I am feeling better      Objective: See treatment diary below      Manual  12/12 1/2 1/10          IASTM lat  Epi B/L 10m 10m 8m          MFR ext  Mass B/L 5m 8m 4mm          MWM radial head    4m                                        Exercise Diary  12/12 1/2 1/10          Nirschl stretch B/L  x10 each x10  ea          Ecc Wrsit  #2 3x10 #2  3x10          ecc supination  hammer 2x10 2x10          Flex   red x20 each Red   x20                                                                                                                                                                                                             HEP-Nirschl, behavior mod  reviewed                Modalities  12/12 1/2 1/10          MHP B/L 10m 10m 10m                                            Assessment: Tolerated treatment well  Patient has tenderness R ECRB muscle       Plan: Continue per plan of care

## 2019-01-16 ENCOUNTER — OFFICE VISIT (OUTPATIENT)
Dept: OCCUPATIONAL THERAPY | Facility: CLINIC | Age: 64
End: 2019-01-16
Payer: OTHER MISCELLANEOUS

## 2019-01-16 DIAGNOSIS — M77.11 LATERAL EPICONDYLITIS OF BOTH ELBOWS: Primary | ICD-10-CM

## 2019-01-16 DIAGNOSIS — M77.12 LATERAL EPICONDYLITIS OF BOTH ELBOWS: Primary | ICD-10-CM

## 2019-01-16 PROCEDURE — 97010 HOT OR COLD PACKS THERAPY: CPT | Performed by: OCCUPATIONAL THERAPIST

## 2019-01-16 PROCEDURE — 97140 MANUAL THERAPY 1/> REGIONS: CPT | Performed by: OCCUPATIONAL THERAPIST

## 2019-01-16 PROCEDURE — 97110 THERAPEUTIC EXERCISES: CPT | Performed by: OCCUPATIONAL THERAPIST

## 2019-01-16 NOTE — PROGRESS NOTES
Daily Note     Today's date: 2019  Patient name: Cassi Rodriguez  : 1955  MRN: [id number]  Referring provider: Terra Pereira MD  Dx:   Encounter Diagnosis     ICD-10-CM    1  Lateral epicondylitis of both elbows M77 11     M77 12                   Subjective: I am feeling better      Objective: See treatment diary below      Manual  12/12 1/2 1/10 1/16         IASTM lat  Epi B/L 10m 10m 8m 8m         MFR ext  Mass B/L 5m 8m 4mm 4m         MWM radial head    4m                                        Exercise Diary  12/12 1/2 1/10 1/16         Nirschl stretch B/L  x10 each x10  ea x10  each         Ecc Wrsit  #2 3x10 #2  3x10 #3 3x10         ecc supination  hammer 2x10 2x10 2x10         Flex   red x20 each Red   x20 Green x20                                                                                                                                                                                                            HEP-Nirschl, behavior mod  reviewed                Modalities  12/12 1/2 1/10 1/16         MHP B/L 10m 10m 10m 10m                                           Assessment: Tolerated treatment well  Patient has tenderness R ECRB muscle , no symptoms on L side today  Plan: Continue per plan of care

## 2019-01-23 DIAGNOSIS — E11.9 CONTROLLED TYPE 2 DIABETES MELLITUS WITHOUT COMPLICATION, WITHOUT LONG-TERM CURRENT USE OF INSULIN (HCC): ICD-10-CM

## 2019-01-23 RX ORDER — SAXAGLIPTIN AND METFORMIN HYDROCHLORIDE 5; 1000 MG/1; MG/1
1 TABLET, FILM COATED, EXTENDED RELEASE ORAL DAILY
Qty: 90 TABLET | Refills: 1 | Status: SHIPPED | OUTPATIENT
Start: 2019-01-23 | End: 2019-05-06 | Stop reason: SDUPTHER

## 2019-01-30 ENCOUNTER — OFFICE VISIT (OUTPATIENT)
Dept: FAMILY MEDICINE CLINIC | Facility: CLINIC | Age: 64
End: 2019-01-30
Payer: COMMERCIAL

## 2019-01-30 VITALS
SYSTOLIC BLOOD PRESSURE: 118 MMHG | HEIGHT: 71 IN | BODY MASS INDEX: 34.33 KG/M2 | WEIGHT: 245.2 LBS | DIASTOLIC BLOOD PRESSURE: 78 MMHG | TEMPERATURE: 97.3 F | HEART RATE: 68 BPM

## 2019-01-30 DIAGNOSIS — M54.50 LOW BACK PAIN WITHOUT SCIATICA, UNSPECIFIED BACK PAIN LATERALITY, UNSPECIFIED CHRONICITY: ICD-10-CM

## 2019-01-30 DIAGNOSIS — E11.9 CONTROLLED TYPE 2 DIABETES MELLITUS WITHOUT COMPLICATION, WITHOUT LONG-TERM CURRENT USE OF INSULIN (HCC): Primary | ICD-10-CM

## 2019-01-30 DIAGNOSIS — I10 ESSENTIAL HYPERTENSION: ICD-10-CM

## 2019-01-30 PROCEDURE — 99214 OFFICE O/P EST MOD 30 MIN: CPT | Performed by: FAMILY MEDICINE

## 2019-01-30 RX ORDER — HYDROCODONE BITARTRATE AND ACETAMINOPHEN 5; 325 MG/1; MG/1
1 TABLET ORAL EVERY 6 HOURS PRN
Qty: 15 TABLET | Refills: 0 | Status: SHIPPED | OUTPATIENT
Start: 2019-01-30 | End: 2019-08-02 | Stop reason: SDUPTHER

## 2019-01-30 RX ORDER — CYCLOBENZAPRINE HCL 10 MG
10 TABLET ORAL 3 TIMES DAILY PRN
Qty: 30 TABLET | Refills: 0 | Status: SHIPPED | OUTPATIENT
Start: 2019-01-30 | End: 2019-08-02 | Stop reason: SDUPTHER

## 2019-01-30 NOTE — PROGRESS NOTES
Assessment/Plan:     Diagnoses and all orders for this visit:    Controlled type 2 diabetes mellitus without complication, without long-term current use of insulin (HCC)    Low back pain without sciatica, unspecified back pain laterality, unspecified chronicity  -     cyclobenzaprine (FLEXERIL) 10 mg tablet; Take 1 tablet (10 mg total) by mouth 3 (three) times a day as needed for muscle spasms  -     HYDROcodone-acetaminophen (NORCO) 5-325 mg per tablet; Take 1 tablet by mouth every 6 (six) hours as needed for pain Max Daily Amount: 4 tablets    Essential hypertension       overall patient doing very well  Refilled his muscle relaxant pain medicine that he uses as needed  Otherwise he is up-to-date on health maintenance and his labs are up-to-date and at controlled levels follow-up in 6 months or sooner if needed      Subjective:     Chief Complaint   Patient presents with    Follow-up     Check up Hypertension/Diabetes  Patient ID: Kain Shaikh is a 61 y o  male  Patient is here for checkup chronic conditions  He reports no acute physical complaints and is feeling well  He would like a refill of some medications        The following portions of the patient's history were reviewed and updated as appropriate: allergies, current medications, past family history, past medical history, past social history, past surgical history and problem list     Review of Systems   Constitutional: Negative  HENT: Negative  Eyes: Negative  Respiratory: Negative  Cardiovascular: Negative  Gastrointestinal: Negative  Endocrine: Negative  Genitourinary: Negative  Musculoskeletal: Negative  Skin: Negative  Allergic/Immunologic: Negative  Neurological: Negative  Hematological: Negative  Psychiatric/Behavioral: Negative  All other systems reviewed and are negative          Objective:    Vitals:    01/30/19 1411 01/30/19 1422   BP: 140/80 118/78   BP Location: Left arm    Patient Position: Sitting    Cuff Size: Standard    Pulse: 68    Temp: (!) 97 3 °F (36 3 °C)    Weight: 111 kg (245 lb 3 2 oz)    Height: 5' 11" (1 803 m)           Physical Exam   Constitutional: He is oriented to person, place, and time  He appears well-developed and well-nourished  No distress  HENT:   Head: Normocephalic  Right Ear: External ear normal    Left Ear: External ear normal    Nose: Nose normal    Mouth/Throat: Oropharynx is clear and moist    Eyes: Pupils are equal, round, and reactive to light  Conjunctivae and EOM are normal  Right eye exhibits no discharge  Left eye exhibits no discharge  Neck: Normal range of motion  Cardiovascular: Normal rate, regular rhythm and normal heart sounds  Pulmonary/Chest: Effort normal and breath sounds normal    Abdominal: Soft  Bowel sounds are normal  He exhibits no distension  There is no tenderness  Musculoskeletal: Normal range of motion  Neurological: He is alert and oriented to person, place, and time  No cranial nerve deficit  Skin: Skin is warm and dry  No rash noted  Psychiatric: He has a normal mood and affect  His behavior is normal  Judgment and thought content normal    Nursing note and vitals reviewed

## 2019-02-01 ENCOUNTER — OFFICE VISIT (OUTPATIENT)
Dept: OCCUPATIONAL THERAPY | Facility: CLINIC | Age: 64
End: 2019-02-01
Payer: OTHER MISCELLANEOUS

## 2019-02-01 DIAGNOSIS — M77.11 LATERAL EPICONDYLITIS OF BOTH ELBOWS: Primary | ICD-10-CM

## 2019-02-01 DIAGNOSIS — M77.12 LATERAL EPICONDYLITIS OF BOTH ELBOWS: Primary | ICD-10-CM

## 2019-02-01 PROCEDURE — 97010 HOT OR COLD PACKS THERAPY: CPT | Performed by: OCCUPATIONAL THERAPIST

## 2019-02-01 PROCEDURE — 97140 MANUAL THERAPY 1/> REGIONS: CPT | Performed by: OCCUPATIONAL THERAPIST

## 2019-02-01 PROCEDURE — 97110 THERAPEUTIC EXERCISES: CPT | Performed by: OCCUPATIONAL THERAPIST

## 2019-02-01 NOTE — PROGRESS NOTES
Daily Note     Today's date: 2019  Patient name: Patricia Shah  : 1955  MRN: [id number]  Referring provider: Nicolette Cohen MD  Dx:   Encounter Diagnosis     ICD-10-CM    1  Lateral epicondylitis of both elbows M77 11     M77 12                   Subjective: I am feeling better      Objective: See treatment diary below      Manual  12/12 1/2 1/10 1/16 2/1        IASTM lat  Epi B/L 10m 10m 8m 8m 8m        MFR ext  Mass B/L 5m 8m 4mm 4m 4m        MWM radial head    4m                                        Exercise Diary  12/12 1/2 1/10 1/16 2/1        Nirschl stretch B/L  x10 each x10  ea x10  each x10 each        Ecc Wrsit  #2 3x10 #2  3x10 #3 3x10 #3 3x10        ecc supination  hammer 2x10 2x10 2x10 2x10        Flex   red x20 each Red   x20 Green x20 Green bands x2                                                                                                                                                                                                           HEP-Nirschl, behavior mod  reviewed                Modalities  12/12 1/2 1/10 1/16 2/1        MHP B/L 10m 10m 10m 10m 10m                                          Assessment: Tolerated treatment well  Patient has tenderness R ECRB muscle , no symptoms on L side today  Plan: Continue per plan of care   F/U with MD on 2/15

## 2019-02-08 ENCOUNTER — OFFICE VISIT (OUTPATIENT)
Dept: OCCUPATIONAL THERAPY | Facility: CLINIC | Age: 64
End: 2019-02-08
Payer: OTHER MISCELLANEOUS

## 2019-02-08 DIAGNOSIS — M77.11 LATERAL EPICONDYLITIS OF BOTH ELBOWS: Primary | ICD-10-CM

## 2019-02-08 DIAGNOSIS — M77.12 LATERAL EPICONDYLITIS OF BOTH ELBOWS: Primary | ICD-10-CM

## 2019-02-08 PROCEDURE — 97010 HOT OR COLD PACKS THERAPY: CPT | Performed by: OCCUPATIONAL THERAPIST

## 2019-02-08 PROCEDURE — 97110 THERAPEUTIC EXERCISES: CPT | Performed by: OCCUPATIONAL THERAPIST

## 2019-02-08 PROCEDURE — 97140 MANUAL THERAPY 1/> REGIONS: CPT | Performed by: OCCUPATIONAL THERAPIST

## 2019-02-08 NOTE — PROGRESS NOTES
Daily Note     Today's date: 2019  Patient name: Shanna Viveros  : 1955  MRN: [id number]  Referring provider: Navarro Barger MD  Dx:   Encounter Diagnosis     ICD-10-CM    1  Lateral epicondylitis of both elbows M77 11     M77 12                   Subjective: I am feeling better      Objective: See treatment diary below      Manual  12/12 1/2 1/10 1/16 2/1 2/8       IASTM lat  Epi B/L 10m 10m 8m 8m 8m 8m       MFR ext  Mass B/L 5m 8m 4mm 4m 4m 4m       MWM radial head    4m                                        Exercise Diary  12/12 1/2 1/10 1/16 2/1 2/8       Nirschl stretch B/L  x10 each x10  ea x10  each x10 each [id number]       Ecc Wrsit  #2 3x10 #2  3x10 #3 3x10 #3 3x10 #3 3x10       ecc supination  hammer 2x10 2x10 2x10 2x10 2x10       Flex   red x20 each Red   x20 Green x20 Green bands x2 Green bands x2       flexbar p/s      Green x30 each                                                                                                                                                                                             HEP-Nirschl, behavior mod  reviewed                Modalities  12/12 1/2 1/10 1/16 2/1 2/8       MHP B/L 10m 10m 10m 10m 10m 10m                                         Assessment: Tolerated treatment well  Patient has mild tenderness at lat  Epi, no symptoms on L side today  Plan: Possible D/C next session   F/U with MD on 2/15

## 2019-02-15 ENCOUNTER — APPOINTMENT (OUTPATIENT)
Dept: OCCUPATIONAL THERAPY | Facility: CLINIC | Age: 64
End: 2019-02-15
Payer: OTHER MISCELLANEOUS

## 2019-02-15 ENCOUNTER — OFFICE VISIT (OUTPATIENT)
Dept: OBGYN CLINIC | Facility: CLINIC | Age: 64
End: 2019-02-15
Payer: OTHER MISCELLANEOUS

## 2019-02-15 VITALS
SYSTOLIC BLOOD PRESSURE: 120 MMHG | DIASTOLIC BLOOD PRESSURE: 60 MMHG | BODY MASS INDEX: 34.44 KG/M2 | WEIGHT: 246 LBS | HEIGHT: 71 IN

## 2019-02-15 DIAGNOSIS — M77.12 LATERAL EPICONDYLITIS OF BOTH ELBOWS: Primary | ICD-10-CM

## 2019-02-15 DIAGNOSIS — M77.11 LATERAL EPICONDYLITIS OF BOTH ELBOWS: Primary | ICD-10-CM

## 2019-02-15 PROCEDURE — 99213 OFFICE O/P EST LOW 20 MIN: CPT | Performed by: ORTHOPAEDIC SURGERY

## 2019-02-15 NOTE — PROGRESS NOTES
Assessment:     1  Lateral epicondylitis of both elbows          Plan:     Problem List Items Addressed This Visit        Musculoskeletal and Integument    Lateral epicondylitis of both elbows - Primary     35-year-old male doing very well with his bilateral lateral epicondylitis  He will follow up in six weeks for repeat evaluation  No restrictions at work  Relevant Orders    Ambulatory referral to Occupational Therapy           Patient ID: Shireen Levi is a 61 y o  male  Chief Complaint:  F/u elbow pain    Subjective:  35-year-old male who was lifting 5 gal buckets that were much crowe than he expected  He grabbed one with each of his hands simultaneously and had immediate pain in his elbows  He care them for about 6 ft before putting them into his truck  This was on August 28, 2018  Since that time he has had some mild improvement  He has pain if he lifts something  If he is reaching out to grab a cup of coffee he has difficulty doing that because of the pain  He denies any numbness tingling or swelling of the hand or fingers  Since his last visit he has continued working with occupational therapy  He notes at least 90 % improvement bilaterally  The right elbow still has some very mild tenderness to palpation in the left elbow he has pain if he is reaching in getting something heavy out of the refrigerator  Allergy:  Allergies   Allergen Reactions    Penicillins Other (See Comments)     hives-emily cefazolin       Medications:  all current active meds have been reviewed    ROS:  Review of Systems   All other systems reviewed and are negative  Objective:  BP Readings from Last 1 Encounters:   02/15/19 120/60      Wt Readings from Last 1 Encounters:   02/15/19 112 kg (246 lb)        Exam:   Physical Exam  Right Elbow Exam     Comments:  Range of motion is 5-130, no swelling, very minimally tender to palpation of the lateral epicondyle, full pronation, and supination    No tenderness in the extensor muscles        Left Elbow Exam     Comments:  Range of motion is 0-130, no swelling, nontender to palpation of the lateral epicondyle, full pronation, and supination

## 2019-02-15 NOTE — ASSESSMENT & PLAN NOTE
49-year-old male doing very well with his bilateral lateral epicondylitis  He will follow up in six weeks for repeat evaluation  No restrictions at work

## 2019-02-21 ENCOUNTER — APPOINTMENT (OUTPATIENT)
Dept: OCCUPATIONAL THERAPY | Facility: CLINIC | Age: 64
End: 2019-02-21
Payer: OTHER MISCELLANEOUS

## 2019-02-28 ENCOUNTER — OFFICE VISIT (OUTPATIENT)
Dept: OCCUPATIONAL THERAPY | Facility: CLINIC | Age: 64
End: 2019-02-28
Payer: OTHER MISCELLANEOUS

## 2019-02-28 DIAGNOSIS — M77.12 LATERAL EPICONDYLITIS OF BOTH ELBOWS: ICD-10-CM

## 2019-02-28 DIAGNOSIS — M77.11 LATERAL EPICONDYLITIS OF BOTH ELBOWS: ICD-10-CM

## 2019-02-28 PROCEDURE — 97110 THERAPEUTIC EXERCISES: CPT | Performed by: OCCUPATIONAL THERAPIST

## 2019-02-28 PROCEDURE — 97140 MANUAL THERAPY 1/> REGIONS: CPT | Performed by: OCCUPATIONAL THERAPIST

## 2019-02-28 NOTE — PROGRESS NOTES
Daily Note     Today's date: 2019  Patient name: Ced Jeffery  : 1955  MRN: [id number]  Referring provider: Jenelle Vaughn MD  Dx:   Encounter Diagnosis     ICD-10-CM    1  Lateral epicondylitis of both elbows M77 11 Ambulatory referral to Occupational Therapy    M77 12                   Subjective: I am  A little sore       Objective: See treatment diary below        Manual  12/12 1/2 1/10 1/16 2/1 2/8       IASTM lat  Epi B/L 10m 10m 8m 8m 8m 8m       MFR ext  Mass B/L 5m 8m 4mm 4m 4m 4m       MWM radial head    4m                                        Exercise Diary  12/12 1/2 1/10 1/16 2/1 2/8 2/28      Nirschl stretch B/L  x10 each x10  ea x10  each x10 each [id number] x10ea      Ecc Wrsit  #2 3x10 #2  3x10 #3 3x10 #3 3x10 #3 3x10 3#  3x10      ecc supination  hammer 2x10 2x10 2x10 2x10 2x10 2x10      Flex   red x20 each Red   x20 Green x20 Green bands x2 Green bands x2 Green  x2      flexbar p/s      Green x30 each Green  x30                                                                                                                                                                                            HEP-Nirschl, behavior mod  reviewed                Modalities  12/12 1/2 1/10 1/16 2/1 2/8 2/28      MHP B/L 10m 10m 10m 10m 10m 10m 10m                                        Assessment: Tolerated treatment well  Patient has improved pain   His R remains mildly tender   Ergo mod was recommended for  computer use  Plan: Continue per plan of care

## 2019-03-07 ENCOUNTER — OFFICE VISIT (OUTPATIENT)
Dept: OCCUPATIONAL THERAPY | Facility: CLINIC | Age: 64
End: 2019-03-07
Payer: OTHER MISCELLANEOUS

## 2019-03-07 DIAGNOSIS — M77.11 LATERAL EPICONDYLITIS OF BOTH ELBOWS: Primary | ICD-10-CM

## 2019-03-07 DIAGNOSIS — M77.12 LATERAL EPICONDYLITIS OF BOTH ELBOWS: Primary | ICD-10-CM

## 2019-03-07 PROCEDURE — 97110 THERAPEUTIC EXERCISES: CPT | Performed by: OCCUPATIONAL THERAPIST

## 2019-03-07 PROCEDURE — 97140 MANUAL THERAPY 1/> REGIONS: CPT | Performed by: OCCUPATIONAL THERAPIST

## 2019-03-07 NOTE — PROGRESS NOTES
Daily Note     Today's date: 3/7/2019  Patient name: Francisco Chisholm  : 1955  MRN: [id number]  Referring provider: Erick Marcelino MD  Dx:   Encounter Diagnosis     ICD-10-CM    1  Lateral epicondylitis of both elbows M77 11     M77 12                   Subjective: I am feeling pretty good      Objective: See treatment diary below          Manual  12/12 1/2 1/10 1/16 2/1 2/8  3/7     IASTM lat  Epi B/L 10m 10m 8m 8m 8m 8m  8m     MFR ext  Mass B/L 5m 8m 4mm 4m 4m 4m  4m     MWM radial head    4m                                        Exercise Diary  12/12 1/2 1/10 1/16 2/1 2/8 2/28 3/7     Nirschl stretch B/L  x10 each x10  ea x10  each x10 each [id number] x10ea x10     Ecc Wrsit  #2 3x10 #2  3x10 #3 3x10 #3 3x10 #3 3x10 3#  3x10 4#  3x10     ecc supination  hammer 2x10 2x10 2x10 2x10 2x10 2x10 2x10     Flex   red x20 each Red   x20 Green x20 Green bands x2 Green bands x2 Green  x2 Green  x2     flexbar p/s      [place] x30 each Green  x30 Green  x30                                                                                                                                                                                            HEP-Nirschl, behavior mod  reviewed                Modalities  12/12 1/2 1/10 1/16 2/1 2/8 2/28 3/7     MHP B/L 10m 10m 10m 10m 10m 10m 10m 10m                                         Assessment: Tolerated treatment well  Patient has improved pain levels   He has minimal tenderness  Plan: Continue per plan of care

## 2019-03-08 ENCOUNTER — TELEPHONE (OUTPATIENT)
Dept: FAMILY MEDICINE CLINIC | Facility: CLINIC | Age: 64
End: 2019-03-08

## 2019-03-08 NOTE — TELEPHONE ENCOUNTER
Spoke to patient and informed that he is due on and eye exam and he stated that he will call the insurance to see who they will cover and he will get back to us if he is in need of referrals  Also will ask the office to fax the results to our office       Jacqui Cook

## 2019-03-12 ENCOUNTER — OFFICE VISIT (OUTPATIENT)
Dept: OCCUPATIONAL THERAPY | Facility: CLINIC | Age: 64
End: 2019-03-12
Payer: OTHER MISCELLANEOUS

## 2019-03-12 DIAGNOSIS — M77.12 LATERAL EPICONDYLITIS OF BOTH ELBOWS: Primary | ICD-10-CM

## 2019-03-12 DIAGNOSIS — M77.11 LATERAL EPICONDYLITIS OF BOTH ELBOWS: Primary | ICD-10-CM

## 2019-03-12 PROCEDURE — 97110 THERAPEUTIC EXERCISES: CPT | Performed by: OCCUPATIONAL THERAPIST

## 2019-03-12 PROCEDURE — 97010 HOT OR COLD PACKS THERAPY: CPT | Performed by: OCCUPATIONAL THERAPIST

## 2019-03-12 PROCEDURE — 97140 MANUAL THERAPY 1/> REGIONS: CPT | Performed by: OCCUPATIONAL THERAPIST

## 2019-03-12 NOTE — PROGRESS NOTES
Daily Note /discharge    Today's date: 3/12/2019  Patient name: Corina Leonardo  : 1955  MRN: [id number]  Referring provider: Marcela Carey MD  Dx:   Encounter Diagnosis     ICD-10-CM    1  Lateral epicondylitis of both elbows M77 11     M77 12                   Subjective: I am feeling much better       Objective: See treatment diary below          Manual  12/12 1/2 1/10 1/16 2/1 2/8  3/7 3/12    IASTM lat  Epi B/L 10m 10m 8m 8m 8m 8m  8m 8m    MFR ext  Mass B/L 5m 8m 4mm 4m 4m 4m  4m 4m    MWM radial head    4m                                        Exercise Diary  12/12 1/2 1/10 1/16 2/1 2/8 2/28 3/7 3/12    Nirschl stretch B/L  x10 each x10  ea x10  each x10 each [id number] x10ea x10 x10    Ecc Wrsit  #2 3x10 #2  3x10 #3 3x10 #3 3x10 #3 3x10 3#  3x10 4#  3x10 4#  3x10    ecc supination  hammer 2x10 2x10 2x10 2x10 2x10 2x10 2x10 2x10    Flex   red x20 each Red   x20 Green x20 Green bands x2 Green bands x2 Green  x2 Green  x2 Green  x2    flexbar p/s      [place] x30 each Green  x30 Green  x30  Green  x30    powerweb           Black 3x10                                                                                                                                                                             HEP-Nirschl, behavior mod  reviewed                Modalities  12/12 1/2 1/10 1/16 2/1 2/8 2/28 3/7 3/12    MHP B/L 10m 10m 10m 10m 10m 10m 10m 10m 10mR                                      Assessment: Tolerated treatment well  Patient had no pain with PRE         Plan: ween/ pt has nnot been seen for > 4wks

## 2019-03-19 ENCOUNTER — APPOINTMENT (OUTPATIENT)
Dept: OCCUPATIONAL THERAPY | Facility: CLINIC | Age: 64
End: 2019-03-19
Payer: OTHER MISCELLANEOUS

## 2019-03-28 ENCOUNTER — APPOINTMENT (OUTPATIENT)
Dept: OCCUPATIONAL THERAPY | Facility: CLINIC | Age: 64
End: 2019-03-28
Payer: OTHER MISCELLANEOUS

## 2019-03-29 ENCOUNTER — OFFICE VISIT (OUTPATIENT)
Dept: OBGYN CLINIC | Facility: CLINIC | Age: 64
End: 2019-03-29
Payer: OTHER MISCELLANEOUS

## 2019-03-29 VITALS
HEIGHT: 70 IN | DIASTOLIC BLOOD PRESSURE: 60 MMHG | SYSTOLIC BLOOD PRESSURE: 130 MMHG | BODY MASS INDEX: 36.36 KG/M2 | WEIGHT: 254 LBS

## 2019-03-29 DIAGNOSIS — M77.11 LATERAL EPICONDYLITIS OF BOTH ELBOWS: Primary | ICD-10-CM

## 2019-03-29 DIAGNOSIS — M77.12 LATERAL EPICONDYLITIS OF BOTH ELBOWS: Primary | ICD-10-CM

## 2019-03-29 PROCEDURE — 99213 OFFICE O/P EST LOW 20 MIN: CPT | Performed by: ORTHOPAEDIC SURGERY

## 2019-03-29 NOTE — ASSESSMENT & PLAN NOTE
80-year-old male doing very well with bilateral lateral epicondylitis  He will continue doing his stretches at home  No restrictions at work  Follow-up in six months

## 2019-03-29 NOTE — PROGRESS NOTES
Assessment:     1  Lateral epicondylitis of both elbows          Plan:     Problem List Items Addressed This Visit        Musculoskeletal and Integument    Lateral epicondylitis of both elbows - Primary     45-year-old male doing very well with bilateral lateral epicondylitis  He will continue doing his stretches at home  No restrictions at work  Follow-up in six months  Patient ID: Susanna Knowles is a 61 y o  male  Chief Complaint:  F/u elbow pain    Subjective:  45-year-old male who was lifting 5 gal buckets that were much crowe than he expected  He grabbed one with each of his hands simultaneously and had immediate pain in his elbows  He care them for about 6 ft before putting them into his truck  This was on August 28, 2018  He has recently completed working with physical therapy  He notes 95% improvement from how he was when I 1st saw him  He has no significant pain in the elbows  Allergy:  Allergies   Allergen Reactions    Penicillins Other (See Comments)     hives-emily cefazolin       Medications:  all current active meds have been reviewed    ROS:  Review of Systems   All other systems reviewed and are negative  Objective:  BP Readings from Last 1 Encounters:   03/29/19 130/60      Wt Readings from Last 1 Encounters:   03/29/19 115 kg (254 lb)        Exam:   Physical Exam  Right Elbow Exam     Comments:  Range of motion is 0-130, no swelling, nontender to palpation of the lateral epicondyle, full pronation, and supination  No tenderness in the extensor muscles        Left Elbow Exam     Comments:  Range of motion is 0-130, no swelling, nontender to palpation of the lateral epicondyle, full pronation, and supination

## 2019-05-06 DIAGNOSIS — E11.9 CONTROLLED TYPE 2 DIABETES MELLITUS WITHOUT COMPLICATION, WITHOUT LONG-TERM CURRENT USE OF INSULIN (HCC): ICD-10-CM

## 2019-05-06 RX ORDER — SAXAGLIPTIN AND METFORMIN HYDROCHLORIDE 5; 1000 MG/1; MG/1
1 TABLET, FILM COATED, EXTENDED RELEASE ORAL DAILY
Qty: 90 TABLET | Refills: 1 | Status: SHIPPED | OUTPATIENT
Start: 2019-05-06 | End: 2019-11-15 | Stop reason: SDUPTHER

## 2019-06-28 ENCOUNTER — TRANSCRIBE ORDERS (OUTPATIENT)
Dept: ADMINISTRATIVE | Facility: HOSPITAL | Age: 64
End: 2019-06-28

## 2019-06-28 DIAGNOSIS — Z87.891 EX-SMOKER: Primary | ICD-10-CM

## 2019-07-10 ENCOUNTER — HOSPITAL ENCOUNTER (OUTPATIENT)
Dept: CT IMAGING | Facility: HOSPITAL | Age: 64
Discharge: HOME | End: 2019-07-10
Payer: COMMERCIAL

## 2019-07-10 DIAGNOSIS — Z87.891 EX-SMOKER: ICD-10-CM

## 2019-07-10 PROCEDURE — 71250 CT THORAX DX C-: CPT

## 2019-08-02 ENCOUNTER — OFFICE VISIT (OUTPATIENT)
Dept: FAMILY MEDICINE CLINIC | Facility: CLINIC | Age: 64
End: 2019-08-02
Payer: COMMERCIAL

## 2019-08-02 VITALS
SYSTOLIC BLOOD PRESSURE: 114 MMHG | DIASTOLIC BLOOD PRESSURE: 76 MMHG | OXYGEN SATURATION: 97 % | HEIGHT: 70 IN | TEMPERATURE: 98.4 F | BODY MASS INDEX: 34.65 KG/M2 | WEIGHT: 242 LBS | HEART RATE: 66 BPM

## 2019-08-02 DIAGNOSIS — E11.9 CONTROLLED TYPE 2 DIABETES MELLITUS WITHOUT COMPLICATION, WITHOUT LONG-TERM CURRENT USE OF INSULIN (HCC): Primary | ICD-10-CM

## 2019-08-02 DIAGNOSIS — Z79.899 HIGH RISK MEDICATION USE: ICD-10-CM

## 2019-08-02 DIAGNOSIS — Z11.59 ENCOUNTER FOR HEPATITIS C SCREENING TEST FOR LOW RISK PATIENT: ICD-10-CM

## 2019-08-02 DIAGNOSIS — M54.50 LOW BACK PAIN WITHOUT SCIATICA, UNSPECIFIED BACK PAIN LATERALITY, UNSPECIFIED CHRONICITY: ICD-10-CM

## 2019-08-02 DIAGNOSIS — J44.0 COPD (CHRONIC OBSTRUCTIVE PULMONARY DISEASE) WITH ACUTE BRONCHITIS (HCC): ICD-10-CM

## 2019-08-02 DIAGNOSIS — E78.5 HYPERLIPIDEMIA, UNSPECIFIED HYPERLIPIDEMIA TYPE: ICD-10-CM

## 2019-08-02 DIAGNOSIS — J20.9 COPD (CHRONIC OBSTRUCTIVE PULMONARY DISEASE) WITH ACUTE BRONCHITIS (HCC): ICD-10-CM

## 2019-08-02 PROCEDURE — 99214 OFFICE O/P EST MOD 30 MIN: CPT | Performed by: FAMILY MEDICINE

## 2019-08-02 RX ORDER — CYCLOBENZAPRINE HCL 10 MG
10 TABLET ORAL 3 TIMES DAILY PRN
Qty: 30 TABLET | Refills: 0 | Status: SHIPPED | OUTPATIENT
Start: 2019-08-02 | End: 2020-02-07 | Stop reason: SDUPTHER

## 2019-08-02 RX ORDER — TIOTROPIUM BROMIDE AND OLODATEROL 3.124; 2.736 UG/1; UG/1
2 SPRAY, METERED RESPIRATORY (INHALATION) DAILY
Refills: 11 | COMMUNITY
Start: 2019-07-06 | End: 2021-08-23

## 2019-08-02 RX ORDER — ALBUTEROL SULFATE 2.5 MG/3ML
2.5 SOLUTION RESPIRATORY (INHALATION) 2 TIMES DAILY
Refills: 6 | COMMUNITY
Start: 2019-07-06 | End: 2022-07-25 | Stop reason: SDUPTHER

## 2019-08-02 RX ORDER — SILDENAFIL 100 MG/1
TABLET, FILM COATED ORAL AS NEEDED
Refills: 1 | COMMUNITY
Start: 2019-07-07

## 2019-08-02 RX ORDER — HYDROCODONE BITARTRATE AND ACETAMINOPHEN 5; 325 MG/1; MG/1
1 TABLET ORAL EVERY 6 HOURS PRN
Qty: 15 TABLET | Refills: 0 | Status: SHIPPED | OUTPATIENT
Start: 2019-08-02 | End: 2020-02-07 | Stop reason: SDUPTHER

## 2019-08-02 NOTE — PROGRESS NOTES
Assessment/Plan:       Diagnoses and all orders for this visit:    Controlled type 2 diabetes mellitus without complication, without long-term current use of insulin (HCC)  -     Comprehensive metabolic panel; Future  -     Hemoglobin A1C; Future  -     Microalbumin / creatinine urine ratio; Future  -     Urinalysis with reflex to microscopic    Hyperlipidemia, unspecified hyperlipidemia type  -     Comprehensive metabolic panel; Future  -     Lipid panel; Future    COPD (chronic obstructive pulmonary disease) with acute bronchitis (HCC)  -     CBC and differential; Future    Low back pain without sciatica, unspecified back pain laterality, unspecified chronicity  -     cyclobenzaprine (FLEXERIL) 10 mg tablet; Take 1 tablet (10 mg total) by mouth 3 (three) times a day as needed for muscle spasms  -     HYDROcodone-acetaminophen (NORCO) 5-325 mg per tablet; Take 1 tablet by mouth every 6 (six) hours as needed for painMax Daily Amount: 4 tablets    Encounter for hepatitis C screening test for low risk patient  -     Hepatitis C antibody; Future    High risk medication use  -     CBC and differential; Future    BMI 34 0-34 9,adult    Other orders  -     sildenafil (VIAGRA) 100 mg tablet  -     albuterol (2 5 mg/3 mL) 0 083 % nebulizer solution; Take 2 5 mg by nebulization 2 (two) times a day  -     STIOLTO RESPIMAT 2 5-2 5 MCG/ACT inhaler; Inhale 2 puffs daily      labs ordered  Continue current medications  Patient need to follow up with Pulmonary as directed  He is up-to-date on his health maintenance  He declined a tetanus shot today  He can follow up with me in 6 months or sooner if needed      Subjective:     Chief Complaint   Patient presents with    Follow-up     6 month check up         Patient ID: Ricardo Reed is a 61 y o  male      Patient is here for checkup of chronic conditions  Reports no acute physical complaints today and is feeling well  He does need a refill of certain medications  Discussed his health maintenance and we are catching him up today  Patient has had a colonoscopy within the last 2 years      The following portions of the patient's history were reviewed and updated as appropriate: allergies, current medications, past family history, past medical history, past social history, past surgical history and problem list     Review of Systems   Constitutional: Negative  HENT: Negative  Eyes: Negative  Respiratory: Negative  Cardiovascular: Negative  Gastrointestinal: Negative  Endocrine: Negative  Genitourinary: Negative  Musculoskeletal: Negative  Skin: Negative  Allergic/Immunologic: Negative  Neurological: Negative  Hematological: Negative  Psychiatric/Behavioral: Negative  All other systems reviewed and are negative  Objective:    Vitals:    08/02/19 1330   BP: 114/76   BP Location: Left arm   Patient Position: Sitting   Cuff Size: Large   Pulse: 66   Temp: 98 4 °F (36 9 °C)   TempSrc: Tympanic   SpO2: 97%   Weight: 110 kg (242 lb)   Height: 5' 10" (1 778 m)          Physical Exam   Constitutional: He is oriented to person, place, and time  He appears well-developed and well-nourished  No distress  HENT:   Head: Normocephalic  Right Ear: External ear normal    Left Ear: External ear normal    Nose: Nose normal    Mouth/Throat: Oropharynx is clear and moist    Eyes: Pupils are equal, round, and reactive to light  Conjunctivae and EOM are normal  Right eye exhibits no discharge  Left eye exhibits no discharge  Neck: Normal range of motion  Cardiovascular: Normal rate, regular rhythm and normal heart sounds  Pulses are no weak pulses  Pulses:       Dorsalis pedis pulses are 2+ on the right side, and 2+ on the left side  Posterior tibial pulses are 2+ on the right side, and 2+ on the left side  Pulmonary/Chest: Effort normal and breath sounds normal    Abdominal: Soft  Bowel sounds are normal  He exhibits no distension   There is no tenderness  Musculoskeletal: Normal range of motion  Feet:   Right Foot:   Skin Integrity: Negative for ulcer, skin breakdown, erythema, warmth, callus or dry skin  Left Foot:   Skin Integrity: Negative for ulcer, skin breakdown, erythema, warmth, callus or dry skin  Neurological: He is alert and oriented to person, place, and time  No cranial nerve deficit  Skin: Skin is warm and dry  No rash noted  Psychiatric: He has a normal mood and affect  His behavior is normal  Judgment and thought content normal    Nursing note and vitals reviewed  Patient's shoes and socks removed  Right Foot/Ankle   Right Foot Inspection  Skin Exam: skin normal skin not intact, no dry skin, no warmth, no callus, no erythema, no maceration, no abnormal color, no pre-ulcer, no ulcer and no callus                          Toe Exam: ROM and strength within normal limits  Sensory   Vibration: intact  Proprioception: intact   Monofilament testing: intact  Vascular  Capillary refills: < 3 seconds  The right DP pulse is 2+  The right PT pulse is 2+  Left Foot/Ankle  Left Foot Inspection  Skin Exam: skin normalskin not intact, no dry skin, no warmth, no erythema, no maceration, normal color, no pre-ulcer, no ulcer and no callus                         Toe Exam: ROM and strength within normal limits                   Sensory   Vibration: intact  Proprioception: intact  Monofilament: intact  Vascular  Capillary refills: < 3 seconds  The left DP pulse is 2+  The left PT pulse is 2+  Assign Risk Category:  No deformity present; No loss of protective sensation; No weak pulses       Risk: 0    BMI Counseling: Body mass index is 34 72 kg/m²  Discussed the patient's BMI with him  The BMI is above average  BMI counseling and education was provided to the patient   Nutrition recommendations include reducing portion sizes, decreasing overall calorie intake, 3-5 servings of fruits/vegetables daily, reducing fast food intake, consuming healthier snacks, decreasing soda and/or juice intake, moderation in carbohydrate intake, increasing intake of lean protein, reducing intake of saturated fat and trans fat and reducing intake of cholesterol  Exercise recommendations include exercising 3-5 times per week

## 2019-08-02 NOTE — PATIENT INSTRUCTIONS
Obesity   AMBULATORY CARE:   Obesity  is when your body mass index (BMI) is greater than 30  Your healthcare provider will use your height and weight to measure your BMI  The risks of obesity include  many health problems, such as injuries or physical disability  You may need tests to check for the following:  · Diabetes     · High blood pressure or high cholesterol     · Heart disease     · Gallbladder or liver disease     · Cancer of the colon, breast, prostate, liver, or kidney     · Sleep apnea     · Arthritis or gout  Seek care immediately if:   · You have a severe headache, confusion, or difficulty speaking  · You have weakness on one side of your body  · You have chest pain, sweating, or shortness of breath  Contact your healthcare provider if:   · You have symptoms of gallbladder or liver disease, such as pain in your upper abdomen  · You have knee or hip pain and discomfort while walking  · You have symptoms of diabetes, such as intense hunger and thirst, and frequent urination  · You have symptoms of sleep apnea, such as snoring or daytime sleepiness  · You have questions or concerns about your condition or care  Treatment for obesity  focuses on helping you lose weight to improve your health  Even a small decrease in BMI can reduce the risk for many health problems  Your healthcare provider will help you set a weight-loss goal   · Lifestyle changes  are the first step in treating obesity  These include making healthy food choices and getting regular physical activity  Your healthcare provider may suggest a weight-loss program that involves coaching, education, and therapy  · Medicine  may help you lose weight when it is used with a healthy diet and physical activity  · Surgery  can help you lose weight if you are very obese and have other health problems  There are several types of weight-loss surgery  Ask your healthcare provider for more information    Be successful losing weight:   · Set small, realistic goals  An example of a small goal is to walk for 20 minutes 5 days a week  Anther goal is to lose 5% of your body weight  · Tell friends, family members, and coworkers about your goals  and ask for their support  Ask a friend to lose weight with you, or join a weight-loss support group  · Identify foods or triggers that may cause you to overeat , and find ways to avoid them  Remove tempting high-calorie foods from your home and workplace  Place a bowl of fresh fruit on your kitchen counter  If stress causes you to eat, then find other ways to cope with stress  · Keep a diary to track what you eat and drink  Also write down how many minutes of physical activity you do each day  Weigh yourself once a week and record it in your diary  Eating changes: You will need to eat 500 to 1,000 fewer calories each day than you currently eat to lose 1 to 2 pounds a week  The following changes will help you cut calories:  · Eat smaller portions  Use small plates, no larger than 9 inches in diameter  Fill your plate half full of fruits and vegetables  Measure your food using measuring cups until you know what a serving size looks like  · Eat 3 meals and 1 or 2 snacks each day  Plan your meals in advance  Iraida Gillis and eat at home most of the time  Eat slowly  · Eat fruits and vegetables at every meal   They are low in calories and high in fiber, which makes you feel full  Do not add butter, margarine, or cream sauce to vegetables  Use herbs to season steamed vegetables  · Eat less fat and fewer fried foods  Eat more baked or grilled chicken and fish  These protein sources are lower in calories and fat than red meat  Limit fast food  Dress your salads with olive oil and vinegar instead of bottled dressing  · Limit the amount of sugar you eat  Do not drink sugary beverages  Limit alcohol  Activity changes:  Physical activity is good for your body in many ways   It helps you burn calories and build strong muscles  It decreases stress and depression, and improves your mood  It can also help you sleep better  Talk to your healthcare provider before you begin an exercise program   · Exercise for at least 30 minutes 5 days a week  Start slowly  Set aside time each day for physical activity that you enjoy and that is convenient for you  It is best to do both weight training and an activity that increases your heart rate, such as walking, bicycling, or swimming  · Find ways to be more active  Do yard work and housecleaning  Walk up the stairs instead of using elevators  Spend your leisure time going to events that require walking, such as outdoor festivals or fairs  This extra physical activity can help you lose weight and keep it off  Follow up with your healthcare provider as directed: You may need to meet with a dietitian  Write down your questions so you remember to ask them during your visits  © 2017 [employer] Information is for End User's use only and may not be sold, redistributed or otherwise used for commercial purposes  All illustrations and images included in CareNotes® are the copyrighted property of [employer]  or [employer]  The above information is an  only  It is not intended as medical advice for individual conditions or treatments  Talk to your doctor, nurse or pharmacist before following any medical regimen to see if it is safe and effective for you

## 2019-08-07 DIAGNOSIS — E11.8 TYPE 2 DIABETES MELLITUS WITH COMPLICATION, WITHOUT LONG-TERM CURRENT USE OF INSULIN (HCC): ICD-10-CM

## 2019-08-07 RX ORDER — GLIMEPIRIDE 1 MG/1
1 TABLET ORAL 2 TIMES DAILY
Qty: 180 TABLET | Refills: 1 | Status: SHIPPED | OUTPATIENT
Start: 2019-08-07 | End: 2020-02-07 | Stop reason: SDUPTHER

## 2019-10-01 ENCOUNTER — OFFICE VISIT (OUTPATIENT)
Dept: SLEEP CENTER | Facility: HOSPITAL | Age: 64
End: 2019-10-01
Payer: COMMERCIAL

## 2019-10-01 VITALS
WEIGHT: 245 LBS | HEART RATE: 70 BPM | SYSTOLIC BLOOD PRESSURE: 126 MMHG | BODY MASS INDEX: 35.07 KG/M2 | DIASTOLIC BLOOD PRESSURE: 82 MMHG | HEIGHT: 70 IN

## 2019-10-01 DIAGNOSIS — J44.0 COPD (CHRONIC OBSTRUCTIVE PULMONARY DISEASE) WITH ACUTE BRONCHITIS (HCC): ICD-10-CM

## 2019-10-01 DIAGNOSIS — E11.9 CONTROLLED TYPE 2 DIABETES MELLITUS WITHOUT COMPLICATION, WITHOUT LONG-TERM CURRENT USE OF INSULIN (HCC): ICD-10-CM

## 2019-10-01 DIAGNOSIS — J20.9 COPD (CHRONIC OBSTRUCTIVE PULMONARY DISEASE) WITH ACUTE BRONCHITIS (HCC): ICD-10-CM

## 2019-10-01 DIAGNOSIS — I10 ESSENTIAL HYPERTENSION: ICD-10-CM

## 2019-10-01 DIAGNOSIS — E66.9 OBESITY (BMI 30-39.9): ICD-10-CM

## 2019-10-01 DIAGNOSIS — G47.33 OBSTRUCTIVE SLEEP APNEA: Primary | ICD-10-CM

## 2019-10-01 DIAGNOSIS — R45.86 MOOD DISTURBANCE: ICD-10-CM

## 2019-10-01 PROCEDURE — 99214 OFFICE O/P EST MOD 30 MIN: CPT | Performed by: INTERNAL MEDICINE

## 2019-10-01 NOTE — PROGRESS NOTES
Review of Systems      Genitourinary none   Cardiology none   Gastrointestinal none   Neurology none   Constitutional none   Integumentary rash or dry skin   Psychiatry none   Musculoskeletal sciatica   Pulmonary wheezing   ENT ringing in ears   Endocrine none   Hematological none

## 2019-10-01 NOTE — PATIENT INSTRUCTIONS

## 2019-10-01 NOTE — PROGRESS NOTES
Follow-Up Note - [facility]   Cj Odom  59 y o  male  :1955  TFX:[id number]    CC: I saw this patient for follow-up in clinic today for his Sleep Disordered Breathing, Coexisting Sleep and Medical Problems  PFSH, Problem List, Medications & Allergies were reviewed in EMR  Interval changes: none reported  He  has a past medical history of Old myocardial infarction and Tear of medial meniscus of left knee, subsequent encounter  He has a current medication list which includes the following prescription(s): albuterol, atenolol, clopidogrel, cyclobenzaprine, ezetimibe-simvastatin, glimepiride, hydrocodone-acetaminophen, kombiglyze xr, quetiapine, sildenafil, and stiolto respimat  ROS: Reviewed (see attached)  Significant for mood is stable on current medication  He has episodic wheezing  Medical conditions are stable  DATA REVIEWED:  No data was available, but he reports regular use of the device for > 4hours/night  SUBJECTIVE: Regarding use of PAP, Allison Vera reports:   · He is experiencing minor adverse effects: dry mouth, mask causes redness / facial marks  and pressure too high and at times awakens because of it  · He is   benefiting from use: sleeping better   · He is not reporting any restless leg symptoms  Sleep Routine: He reports getting >8 hrs sleep  ; he has no difficulty initiating or maintaining sleep   He awakens with the aid of an alarm and feels refreshed  He denied excessive drowsiness   He rated himself at Total score: 0 /24 on the [place] sleepiness scale  Habits: reports that he quit smoking about 6 years ago  He has quit using smokeless tobacco ,  reports that he does not drink alcohol ,  reports that he has current or past drug history  Drug: Marijuana  , Caffeine use: limited , Exercise routine: none         OBJECTIVE: /82   Pulse 70   Ht 5' 10" (1 778 m)   Wt 111 kg (245 lb)   BMI 35 15 kg/m²    Constitutional: Patient is well groomed; well appearing  Skin/Extrem: warm & dry; col & hydration normal; no edema  Psych: cooperativeand in no distress  Mental State appears normal   CNS: Alert, orientated, clear & coherent speech  H&N: EOMI; NC/AT:no facial pressure marks, no rashes  Neck Circumference: 16"  ENMT Mucus membranes normal Nasal airway:patent  Oral airway: crowded  Resp:effort is normal CVS: RRR ABD:truncal obesity MSK:Gait normal     ASSESSMENT: Primary Sleep/Secondary(to Medical or Psych conditions) & comorbidities   1  Obstructive sleep apnea  PAP DME Resupply/Reorder   2  COPD (chronic obstructive pulmonary disease) with acute bronchitis ([facility] )     3  Mood disturbance     4  Obesity (BMI 30-39 9)     5  Essential hypertension     6  Controlled type 2 diabetes mellitus without complication, without long-term current use of insulin ([facility] )         PLAN:  1  I reviewed results of the Sleep studies with the patient  2  I discussed treatment options with risks and benefits  3  Treatment with  PAP is medically necessary and Bernis Paling is agreable to continue use at current pressure setting  4  Care of equipment, methods to improve comfort using PAP and importance of compliance with therapy were discussed  5  Pressure setting: continue 20/14 cmH2O     6  Rx provided to replace supplies and Care coordinated with DME provider  7  Strategies for weight reduction were discussed  8  Follow-up is advised in 1 year or sooner if needed to monitor progress, compliance and to adjust therapy  Thank you for allowing me to participate in the care of this patient      Sincerely,    Authenticated electronically by Rebecca Melvin MD on 80/49/80   Board Certified Specialist

## 2019-10-04 ENCOUNTER — OFFICE VISIT (OUTPATIENT)
Dept: OBGYN CLINIC | Facility: CLINIC | Age: 64
End: 2019-10-04
Payer: OTHER MISCELLANEOUS

## 2019-10-04 ENCOUNTER — TELEPHONE (OUTPATIENT)
Dept: SLEEP CENTER | Facility: CLINIC | Age: 64
End: 2019-10-04

## 2019-10-04 VITALS
WEIGHT: 244 LBS | HEART RATE: 72 BPM | HEIGHT: 70 IN | BODY MASS INDEX: 34.93 KG/M2 | DIASTOLIC BLOOD PRESSURE: 80 MMHG | SYSTOLIC BLOOD PRESSURE: 122 MMHG

## 2019-10-04 DIAGNOSIS — M77.12 LATERAL EPICONDYLITIS OF BOTH ELBOWS: Primary | ICD-10-CM

## 2019-10-04 DIAGNOSIS — M77.11 LATERAL EPICONDYLITIS OF BOTH ELBOWS: Primary | ICD-10-CM

## 2019-10-04 PROCEDURE — 99213 OFFICE O/P EST LOW 20 MIN: CPT | Performed by: ORTHOPAEDIC SURGERY

## 2019-10-04 NOTE — PROGRESS NOTES
Assessment:     1  Lateral epicondylitis of both elbows          Plan:     Problem List Items Addressed This Visit        Musculoskeletal and Integument    Lateral epicondylitis of both elbows - Primary     Findings consistent with bilateral elbow lateral epicondylitis-resolved  Patient is doing very well  Discussed importance of continuing activity modification with if symptoms start to return in the future  Continue home exercises and stretches  Continue work with no restrictions  Follow-up as needed if any problems arise  All questions were answered to patient's satisfaction  Plan discussed with Dr Pilar Dickson  Patient ID: Blanca Boyle is a 59 y o  male  Chief Complaint:  F/u elbow pain    Subjective:  59-year-old male who is following up for bilateral elbow lateral epicondylitis  He was lifting 5 gal buckets that were much crowe than he expected at work  He grabbed one with each of his hands simultaneously and had immediate pain in his elbows  He care them for about 6 ft before putting them into his truck  This was on August 28, 2018  Over the past 6 months, he continues to do the home exercises  He no longer has any symptoms in the right elbow  He had very mild pain in the left elbow about 2 weeks ago when he carried a vacuum  down his stairs  The pain is already improving  He is back doing his normal activities  Allergy:  Allergies   Allergen Reactions    Penicillins Other (See Comments)     hives-emily cefazolin       Medications:  all current active meds have been reviewed    ROS:  Review of Systems   Musculoskeletal: Negative for arthralgias  All other systems reviewed and are negative  Objective:  BP Readings from Last 1 Encounters:   10/04/19 122/80      Wt Readings from Last 1 Encounters:   10/04/19 111 kg (244 lb)        Exam:   Physical Exam  Right Elbow Exam     Tenderness   The patient is experiencing no tenderness       Range of Motion   The patient has normal right elbow ROM  Muscle Strength   The patient has normal right elbow strength  Other   Erythema: absent  Scars: absent  Sensation: normal  Pulse: present    Comments:          Left Elbow Exam     Tenderness   The patient is experiencing no tenderness  Range of Motion   The patient has normal left elbow ROM  Muscle Strength   The patient has normal left elbow strength      Other   Erythema: absent  Scars: absent  Sensation: normal  Pulse: present

## 2019-10-04 NOTE — ASSESSMENT & PLAN NOTE
Findings consistent with bilateral elbow lateral epicondylitis-resolved  Patient is doing very well  Discussed importance of continuing activity modification with if symptoms start to return in the future  Continue home exercises and stretches  Continue work with no restrictions  Follow-up as needed if any problems arise  All questions were answered to patient's satisfaction  Plan discussed with Dr Mirian Chin

## 2019-10-12 ENCOUNTER — APPOINTMENT (OUTPATIENT)
Dept: LAB | Facility: CLINIC | Age: 64
End: 2019-10-12
Payer: COMMERCIAL

## 2019-10-12 ENCOUNTER — TRANSCRIBE ORDERS (OUTPATIENT)
Dept: ADMINISTRATIVE | Facility: HOSPITAL | Age: 64
End: 2019-10-12

## 2019-10-12 DIAGNOSIS — Z11.59 ENCOUNTER FOR HEPATITIS C SCREENING TEST FOR LOW RISK PATIENT: ICD-10-CM

## 2019-10-12 DIAGNOSIS — R74.8 ACID PHOSPHATASE ELEVATED: ICD-10-CM

## 2019-10-12 DIAGNOSIS — J20.9 COPD (CHRONIC OBSTRUCTIVE PULMONARY DISEASE) WITH ACUTE BRONCHITIS (HCC): ICD-10-CM

## 2019-10-12 DIAGNOSIS — E78.2 MIXED HYPERLIPIDEMIA: ICD-10-CM

## 2019-10-12 DIAGNOSIS — Z79.899 HIGH RISK MEDICATION USE: ICD-10-CM

## 2019-10-12 DIAGNOSIS — I10 ESSENTIAL HYPERTENSION, MALIGNANT: ICD-10-CM

## 2019-10-12 DIAGNOSIS — R73.09 IMPAIRED GLUCOSE TOLERANCE TEST: ICD-10-CM

## 2019-10-12 DIAGNOSIS — E11.9 CONTROLLED TYPE 2 DIABETES MELLITUS WITHOUT COMPLICATION, WITHOUT LONG-TERM CURRENT USE OF INSULIN (HCC): ICD-10-CM

## 2019-10-12 DIAGNOSIS — E78.5 HYPERLIPIDEMIA, UNSPECIFIED HYPERLIPIDEMIA TYPE: ICD-10-CM

## 2019-10-12 DIAGNOSIS — R73.09 IMPAIRED GLUCOSE TOLERANCE TEST: Primary | ICD-10-CM

## 2019-10-12 DIAGNOSIS — J44.0 COPD (CHRONIC OBSTRUCTIVE PULMONARY DISEASE) WITH ACUTE BRONCHITIS (HCC): ICD-10-CM

## 2019-10-12 LAB
ALBUMIN SERPL BCP-MCNC: 3.8 G/DL (ref 3.5–5)
ALP SERPL-CCNC: 55 U/L (ref 46–116)
ALT SERPL W P-5'-P-CCNC: 25 U/L (ref 12–78)
ANION GAP SERPL CALCULATED.3IONS-SCNC: 4 MMOL/L (ref 4–13)
AST SERPL W P-5'-P-CCNC: 9 U/L (ref 5–45)
BASOPHILS # BLD AUTO: 0.11 THOUSANDS/ΜL (ref 0–0.1)
BASOPHILS NFR BLD AUTO: 1 % (ref 0–1)
BILIRUB DIRECT SERPL-MCNC: 0.13 MG/DL (ref 0–0.2)
BILIRUB SERPL-MCNC: 0.33 MG/DL (ref 0.2–1)
BILIRUB UR QL STRIP: NEGATIVE
BUN SERPL-MCNC: 16 MG/DL (ref 5–25)
CALCIUM SERPL-MCNC: 9.7 MG/DL (ref 8.3–10.1)
CHLORIDE SERPL-SCNC: 105 MMOL/L (ref 100–108)
CHOLEST SERPL-MCNC: 126 MG/DL (ref 50–200)
CLARITY UR: CLEAR
CO2 SERPL-SCNC: 27 MMOL/L (ref 21–32)
COLOR UR: YELLOW
CREAT SERPL-MCNC: 0.89 MG/DL (ref 0.6–1.3)
CREAT UR-MCNC: 59.6 MG/DL
EOSINOPHIL # BLD AUTO: 0.43 THOUSAND/ΜL (ref 0–0.61)
EOSINOPHIL NFR BLD AUTO: 5 % (ref 0–6)
ERYTHROCYTE [DISTWIDTH] IN BLOOD BY AUTOMATED COUNT: 12.6 % (ref 11.6–15.1)
EST. AVERAGE GLUCOSE BLD GHB EST-MCNC: 154 MG/DL
GFR SERPL CREATININE-BSD FRML MDRD: 90 ML/MIN/1.73SQ M
GLUCOSE P FAST SERPL-MCNC: 139 MG/DL (ref 65–99)
GLUCOSE UR STRIP-MCNC: NEGATIVE MG/DL
HBA1C MFR BLD: 7 % (ref 4.2–6.3)
HCT VFR BLD AUTO: 43.5 % (ref 36.5–49.3)
HCV AB SER QL: NORMAL
HDLC SERPL-MCNC: 45 MG/DL (ref 40–60)
HGB BLD-MCNC: 14.5 G/DL (ref 12–17)
HGB UR QL STRIP.AUTO: NEGATIVE
IMM GRANULOCYTES # BLD AUTO: 0.06 THOUSAND/UL (ref 0–0.2)
IMM GRANULOCYTES NFR BLD AUTO: 1 % (ref 0–2)
KETONES UR STRIP-MCNC: NEGATIVE MG/DL
LDLC SERPL CALC-MCNC: 61 MG/DL (ref 0–100)
LEUKOCYTE ESTERASE UR QL STRIP: NEGATIVE
LYMPHOCYTES # BLD AUTO: 2.16 THOUSANDS/ΜL (ref 0.6–4.47)
LYMPHOCYTES NFR BLD AUTO: 24 % (ref 14–44)
MCH RBC QN AUTO: 31.6 PG (ref 26.8–34.3)
MCHC RBC AUTO-ENTMCNC: 33.3 G/DL (ref 31.4–37.4)
MCV RBC AUTO: 95 FL (ref 82–98)
MICROALBUMIN UR-MCNC: <5 MG/L (ref 0–20)
MICROALBUMIN/CREAT 24H UR: <8 MG/G CREATININE (ref 0–30)
MONOCYTES # BLD AUTO: 0.53 THOUSAND/ΜL (ref 0.17–1.22)
MONOCYTES NFR BLD AUTO: 6 % (ref 4–12)
NEUTROPHILS # BLD AUTO: 5.85 THOUSANDS/ΜL (ref 1.85–7.62)
NEUTS SEG NFR BLD AUTO: 63 % (ref 43–75)
NITRITE UR QL STRIP: NEGATIVE
NONHDLC SERPL-MCNC: 81 MG/DL
NRBC BLD AUTO-RTO: 0 /100 WBCS
PH UR STRIP.AUTO: 6 [PH]
PLATELET # BLD AUTO: 169 THOUSANDS/UL (ref 149–390)
PMV BLD AUTO: 10.3 FL (ref 8.9–12.7)
POTASSIUM SERPL-SCNC: 4.3 MMOL/L (ref 3.5–5.3)
PROT SERPL-MCNC: 7.7 G/DL (ref 6.4–8.2)
PROT UR STRIP-MCNC: NEGATIVE MG/DL
RBC # BLD AUTO: 4.59 MILLION/UL (ref 3.88–5.62)
SODIUM SERPL-SCNC: 136 MMOL/L (ref 136–145)
SP GR UR STRIP.AUTO: 1.01 (ref 1–1.03)
TRIGL SERPL-MCNC: 100 MG/DL
TSH SERPL DL<=0.05 MIU/L-ACNC: 2.26 UIU/ML (ref 0.36–3.74)
UROBILINOGEN UR QL STRIP.AUTO: 0.2 E.U./DL
WBC # BLD AUTO: 9.14 THOUSAND/UL (ref 4.31–10.16)

## 2019-10-12 PROCEDURE — 84443 ASSAY THYROID STIM HORMONE: CPT

## 2019-10-12 PROCEDURE — 80053 COMPREHEN METABOLIC PANEL: CPT

## 2019-10-12 PROCEDURE — 86803 HEPATITIS C AB TEST: CPT

## 2019-10-12 PROCEDURE — 81003 URINALYSIS AUTO W/O SCOPE: CPT | Performed by: FAMILY MEDICINE

## 2019-10-12 PROCEDURE — 82248 BILIRUBIN DIRECT: CPT

## 2019-10-12 PROCEDURE — 85025 COMPLETE CBC W/AUTO DIFF WBC: CPT

## 2019-10-12 PROCEDURE — 80061 LIPID PANEL: CPT

## 2019-10-12 PROCEDURE — 36415 COLL VENOUS BLD VENIPUNCTURE: CPT

## 2019-10-12 PROCEDURE — 83036 HEMOGLOBIN GLYCOSYLATED A1C: CPT

## 2019-10-12 PROCEDURE — 82043 UR ALBUMIN QUANTITATIVE: CPT

## 2019-10-12 PROCEDURE — 82570 ASSAY OF URINE CREATININE: CPT

## 2019-10-15 NOTE — TELEPHONE ENCOUNTER
Dr Irma Obrien is calling to see what the status of the prior auth on this medication is because he has been without this med for about a week and his blood sugars are out of control and I know I sent something back yesterday on this and none of the girls know anything, can you help me give Therese Fuchs an answer, please        KOMBIGLYZE XR 5-1000 MG TB24

## 2019-10-15 NOTE — TELEPHONE ENCOUNTER
I told patient we are still working on this  Please complete the new form Iker has given to you and have it faxed today   Thank you

## 2019-10-18 ENCOUNTER — TELEPHONE (OUTPATIENT)
Dept: FAMILY MEDICINE CLINIC | Facility: CLINIC | Age: 64
End: 2019-10-18

## 2019-10-18 NOTE — TELEPHONE ENCOUNTER
Spoke to patient and informed that his prio-auth was submitted and its on the insurance to authorize it

## 2019-10-18 NOTE — TELEPHONE ENCOUNTER
Brandie Godwin is still waiting to  Hear about his medication    He has been without this med for over a week and his blood sugars are out of whack, can someone please find out what is going on    [phone]

## 2019-10-25 LAB
LEFT EYE DIABETIC RETINOPATHY: NORMAL
RIGHT EYE DIABETIC RETINOPATHY: NORMAL

## 2019-11-15 DIAGNOSIS — E11.9 CONTROLLED TYPE 2 DIABETES MELLITUS WITHOUT COMPLICATION, WITHOUT LONG-TERM CURRENT USE OF INSULIN (HCC): ICD-10-CM

## 2019-11-17 RX ORDER — SAXAGLIPTIN AND METFORMIN HYDROCHLORIDE 5; 1000 MG/1; MG/1
1 TABLET, FILM COATED, EXTENDED RELEASE ORAL DAILY
Qty: 90 TABLET | Refills: 1 | Status: SHIPPED | OUTPATIENT
Start: 2019-11-17 | End: 2020-01-06 | Stop reason: SDUPTHER

## 2019-11-18 ENCOUNTER — TELEPHONE (OUTPATIENT)
Dept: FAMILY MEDICINE CLINIC | Facility: CLINIC | Age: 64
End: 2019-11-18

## 2019-12-11 ENCOUNTER — TRANSCRIBE ORDERS (OUTPATIENT)
Dept: ADMINISTRATIVE | Facility: HOSPITAL | Age: 64
End: 2019-12-11

## 2019-12-11 DIAGNOSIS — I71.4 ABDOMINAL AORTIC ANEURYSM WITHOUT RUPTURE (HCC): Primary | ICD-10-CM

## 2019-12-11 DIAGNOSIS — I65.23 BILATERAL CAROTID ARTERY OCCLUSION: ICD-10-CM

## 2019-12-12 ENCOUNTER — HOSPITAL ENCOUNTER (OUTPATIENT)
Dept: NON INVASIVE DIAGNOSTICS | Facility: HOSPITAL | Age: 64
Discharge: HOME | End: 2019-12-12
Payer: COMMERCIAL

## 2019-12-12 DIAGNOSIS — I65.23 BILATERAL CAROTID ARTERY OCCLUSION: ICD-10-CM

## 2019-12-12 PROCEDURE — 93880 EXTRACRANIAL BILAT STUDY: CPT

## 2019-12-12 PROCEDURE — 93880 EXTRACRANIAL BILAT STUDY: CPT | Performed by: SURGERY

## 2019-12-18 ENCOUNTER — HOSPITAL ENCOUNTER (OUTPATIENT)
Dept: ULTRASOUND IMAGING | Facility: HOSPITAL | Age: 64
Discharge: HOME | End: 2019-12-18
Payer: COMMERCIAL

## 2019-12-18 DIAGNOSIS — I71.4 ABDOMINAL AORTIC ANEURYSM WITHOUT RUPTURE (HCC): ICD-10-CM

## 2019-12-18 PROCEDURE — 76775 US EXAM ABDO BACK WALL LIM: CPT

## 2020-01-06 ENCOUNTER — TELEPHONE (OUTPATIENT)
Dept: FAMILY MEDICINE CLINIC | Facility: CLINIC | Age: 65
End: 2020-01-06

## 2020-01-06 DIAGNOSIS — E11.9 CONTROLLED TYPE 2 DIABETES MELLITUS WITHOUT COMPLICATION, WITHOUT LONG-TERM CURRENT USE OF INSULIN (HCC): ICD-10-CM

## 2020-01-06 RX ORDER — SAXAGLIPTIN AND METFORMIN HYDROCHLORIDE 5; 1000 MG/1; MG/1
1 TABLET, FILM COATED, EXTENDED RELEASE ORAL DAILY
Qty: 90 TABLET | Refills: 1 | Status: SHIPPED | OUTPATIENT
Start: 2020-01-06 | End: 2020-04-09 | Stop reason: SDUPTHER

## 2020-01-06 NOTE — TELEPHONE ENCOUNTER
PT NEEDS KOMIGLYZE XR TAB 5-1000MG  SEND TO NEW MAIL ORDER   SCRIPT SOURCING  90 DAYS WITH REFILLS          THIS IS A NEW MAILORDER FAX TO [phone]  ADDRESS [address] [place] [address] 9[address]  [phone]      ANY QUESTIONS CALL [phone]

## 2020-02-07 ENCOUNTER — OFFICE VISIT (OUTPATIENT)
Dept: FAMILY MEDICINE CLINIC | Facility: CLINIC | Age: 65
End: 2020-02-07
Payer: COMMERCIAL

## 2020-02-07 VITALS
SYSTOLIC BLOOD PRESSURE: 126 MMHG | TEMPERATURE: 97.1 F | OXYGEN SATURATION: 96 % | HEART RATE: 57 BPM | BODY MASS INDEX: 34.84 KG/M2 | DIASTOLIC BLOOD PRESSURE: 72 MMHG | HEIGHT: 70 IN | WEIGHT: 243.4 LBS | RESPIRATION RATE: 22 BRPM

## 2020-02-07 DIAGNOSIS — E11.8 TYPE 2 DIABETES MELLITUS WITH COMPLICATION, WITHOUT LONG-TERM CURRENT USE OF INSULIN (HCC): Primary | ICD-10-CM

## 2020-02-07 DIAGNOSIS — M54.50 LOW BACK PAIN WITHOUT SCIATICA, UNSPECIFIED BACK PAIN LATERALITY, UNSPECIFIED CHRONICITY: ICD-10-CM

## 2020-02-07 DIAGNOSIS — I10 ESSENTIAL HYPERTENSION: ICD-10-CM

## 2020-02-07 DIAGNOSIS — J44.9 CHRONIC OBSTRUCTIVE PULMONARY DISEASE, UNSPECIFIED COPD TYPE (HCC): ICD-10-CM

## 2020-02-07 PROBLEM — M75.21 BICEPS TENDINITIS OF RIGHT UPPER EXTREMITY: Status: RESOLVED | Noted: 2017-09-19 | Resolved: 2020-02-07

## 2020-02-07 PROBLEM — G25.81 RESTLESS LEG SYNDROME: Status: RESOLVED | Noted: 2018-04-17 | Resolved: 2020-02-07

## 2020-02-07 LAB — SL AMB POCT HEMOGLOBIN AIC: 7.2 (ref ?–6.5)

## 2020-02-07 PROCEDURE — 3078F DIAST BP <80 MM HG: CPT | Performed by: FAMILY MEDICINE

## 2020-02-07 PROCEDURE — 2022F DILAT RTA XM EVC RTNOPTHY: CPT | Performed by: FAMILY MEDICINE

## 2020-02-07 PROCEDURE — 3074F SYST BP LT 130 MM HG: CPT | Performed by: FAMILY MEDICINE

## 2020-02-07 PROCEDURE — 83036 HEMOGLOBIN GLYCOSYLATED A1C: CPT | Performed by: FAMILY MEDICINE

## 2020-02-07 PROCEDURE — 99214 OFFICE O/P EST MOD 30 MIN: CPT | Performed by: FAMILY MEDICINE

## 2020-02-07 PROCEDURE — 1036F TOBACCO NON-USER: CPT | Performed by: FAMILY MEDICINE

## 2020-02-07 PROCEDURE — 3008F BODY MASS INDEX DOCD: CPT | Performed by: FAMILY MEDICINE

## 2020-02-07 RX ORDER — CYCLOBENZAPRINE HCL 10 MG
10 TABLET ORAL 3 TIMES DAILY PRN
Qty: 30 TABLET | Refills: 3 | Status: SHIPPED | OUTPATIENT
Start: 2020-02-07 | End: 2020-08-07 | Stop reason: SDUPTHER

## 2020-02-07 RX ORDER — GLIMEPIRIDE 1 MG/1
1 TABLET ORAL 2 TIMES DAILY
Qty: 180 TABLET | Refills: 1 | Status: SHIPPED | OUTPATIENT
Start: 2020-02-07 | End: 2020-08-06 | Stop reason: SDUPTHER

## 2020-02-07 RX ORDER — HYDROCODONE BITARTRATE AND ACETAMINOPHEN 5; 325 MG/1; MG/1
1 TABLET ORAL EVERY 6 HOURS PRN
Qty: 15 TABLET | Refills: 0 | Status: SHIPPED | OUTPATIENT
Start: 2020-02-07 | End: 2020-08-07 | Stop reason: SDUPTHER

## 2020-02-07 NOTE — PROGRESS NOTES
Assessment/Plan:       Diagnoses and all orders for this visit:    Type 2 diabetes mellitus with complication, without long-term current use of insulin (HCC)  -     glimepiride (AMARYL) 1 mg tablet; Take 1 tablet (1 mg total) by mouth 2 (two) times a day  -     POCT hemoglobin A1c    Low back pain without sciatica, unspecified back pain laterality, unspecified chronicity  -     HYDROcodone-acetaminophen (NORCO) 5-325 mg per tablet; Take 1 tablet by mouth every 6 (six) hours as needed for painMax Daily Amount: 4 tablets  -     cyclobenzaprine (FLEXERIL) 10 mg tablet; Take 1 tablet (10 mg total) by mouth 3 (three) times a day as needed for muscle spasms    Chronic obstructive pulmonary disease, unspecified COPD type ([facility] )    Essential hypertension      overall patient doing very well  He has no acute complaints today  We refilled his medications  His lungs are stable with his current inhalers  His sugars was increased a little bit his A1c was 7 2  Will make no adjustments today but he needs to watch his diet  This may have been some of his indulgences over the holidays  Follow-up with me in 6 months or sooner if needed      Subjective:     Chief Complaint   Patient presents with    Follow-up     6 month check up         Patient ID: Nathaniel Harrison is a 59 y o  male  Patient is here for six-month checkup chronic conditions  He reports feeling well with no major complaints  He needs refills of certain medications      The following portions of the patient's history were reviewed and updated as appropriate: allergies, current medications, past family history, past medical history, past social history, past surgical history and problem list     Review of Systems   Constitutional: Negative  HENT: Negative  Eyes: Negative  Respiratory: Negative  Cardiovascular: Negative  Gastrointestinal: Negative  Endocrine: Negative  Genitourinary: Negative  Musculoskeletal: Negative  Skin: Negative  Allergic/Immunologic: Negative  Neurological: Negative  Hematological: Negative  Psychiatric/Behavioral: Negative  All other systems reviewed and are negative  Objective:    Vitals:    02/07/20 1255   BP: 126/72   BP Location: Right arm   Patient Position: Sitting   Cuff Size: Large   Pulse: 57   Resp: 22   Temp: (!) 97 1 °F (36 2 °C)   TempSrc: Tympanic   SpO2: 96%   Weight: 110 kg (243 lb 6 4 oz)   Height: 5' 10" (1 778 m)          Physical Exam   Constitutional: He is oriented to person, place, and time  He appears well-developed and well-nourished  No distress  HENT:   Head: Normocephalic  Right Ear: External ear normal    Left Ear: External ear normal    Nose: Nose normal    Mouth/Throat: Oropharynx is clear and moist    Eyes: Pupils are equal, round, and reactive to light  Conjunctivae and EOM are normal  Right eye exhibits no discharge  Left eye exhibits no discharge  Neck: Normal range of motion  Cardiovascular: Normal rate, regular rhythm and normal heart sounds  Pulmonary/Chest: Effort normal and breath sounds normal    Abdominal: Soft  Bowel sounds are normal  He exhibits no distension  There is no tenderness  Musculoskeletal: Normal range of motion  Neurological: He is alert and oriented to person, place, and time  No cranial nerve deficit  Skin: Skin is warm and dry  No rash noted  Psychiatric: He has a normal mood and affect  His behavior is normal  Judgment and thought content normal    Nursing note and vitals reviewed

## 2020-04-09 DIAGNOSIS — E11.9 CONTROLLED TYPE 2 DIABETES MELLITUS WITHOUT COMPLICATION, WITHOUT LONG-TERM CURRENT USE OF INSULIN (HCC): ICD-10-CM

## 2020-04-09 RX ORDER — SAXAGLIPTIN AND METFORMIN HYDROCHLORIDE 5; 1000 MG/1; MG/1
1 TABLET, FILM COATED, EXTENDED RELEASE ORAL DAILY
Qty: 90 TABLET | Refills: 1 | Status: SHIPPED | OUTPATIENT
Start: 2020-04-09 | End: 2021-12-15 | Stop reason: SDUPTHER

## 2020-05-29 DIAGNOSIS — E11.9 CONTROLLED TYPE 2 DIABETES MELLITUS WITHOUT COMPLICATION, WITHOUT LONG-TERM CURRENT USE OF INSULIN (HCC): Primary | ICD-10-CM

## 2020-06-29 ENCOUNTER — TELEPHONE (OUTPATIENT)
Dept: FAMILY MEDICINE CLINIC | Facility: CLINIC | Age: 65
End: 2020-06-29

## 2020-06-30 DIAGNOSIS — E11.8 TYPE 2 DIABETES MELLITUS WITH COMPLICATION, WITHOUT LONG-TERM CURRENT USE OF INSULIN (HCC): Primary | ICD-10-CM

## 2020-06-30 RX ORDER — DIPHENHYDRAMINE HYDROCHLORIDE 25 MG/1
CAPSULE, LIQUID FILLED ORAL 2 TIMES DAILY
Qty: 100 EACH | Refills: 3 | Status: SHIPPED | OUTPATIENT
Start: 2020-06-30

## 2020-06-30 RX ORDER — DIPHENHYDRAMINE HYDROCHLORIDE 25 MG/1
CAPSULE, LIQUID FILLED ORAL 2 TIMES DAILY
COMMUNITY
End: 2020-06-30 | Stop reason: SDUPTHER

## 2020-08-03 ENCOUNTER — TELEPHONE (OUTPATIENT)
Dept: FAMILY MEDICINE CLINIC | Facility: CLINIC | Age: 65
End: 2020-08-03

## 2020-08-03 NOTE — TELEPHONE ENCOUNTER
Patient called  He said that he is suppose to get blood work done before his appt  He asked if you could order it  He will get it done through [facility]  I told him I would call when the orders were in the computer  [phone]  Thank you

## 2020-08-04 DIAGNOSIS — Z79.899 HIGH RISK MEDICATION USE: ICD-10-CM

## 2020-08-04 DIAGNOSIS — E78.5 HYPERLIPIDEMIA, UNSPECIFIED HYPERLIPIDEMIA TYPE: ICD-10-CM

## 2020-08-04 DIAGNOSIS — Z12.5 SCREENING FOR PROSTATE CANCER: ICD-10-CM

## 2020-08-04 DIAGNOSIS — E11.9 CONTROLLED TYPE 2 DIABETES MELLITUS WITHOUT COMPLICATION, WITHOUT LONG-TERM CURRENT USE OF INSULIN (HCC): Primary | ICD-10-CM

## 2020-08-04 NOTE — TELEPHONE ENCOUNTER
I ordered his blood work  If he goes to [facility] it is in the system  Please let him know that the blood work is ordered

## 2020-08-05 ENCOUNTER — TRANSCRIBE ORDERS (OUTPATIENT)
Dept: LAB | Facility: CLINIC | Age: 65
End: 2020-08-05

## 2020-08-06 DIAGNOSIS — E11.8 TYPE 2 DIABETES MELLITUS WITH COMPLICATION, WITHOUT LONG-TERM CURRENT USE OF INSULIN (HCC): ICD-10-CM

## 2020-08-06 RX ORDER — GLIMEPIRIDE 1 MG/1
1 TABLET ORAL 2 TIMES DAILY
Qty: 180 TABLET | Refills: 1 | Status: SHIPPED | OUTPATIENT
Start: 2020-08-06 | End: 2021-02-04 | Stop reason: SDUPTHER

## 2020-08-07 ENCOUNTER — TELEPHONE (OUTPATIENT)
Dept: FAMILY MEDICINE CLINIC | Facility: CLINIC | Age: 65
End: 2020-08-07

## 2020-08-07 DIAGNOSIS — M54.50 LOW BACK PAIN WITHOUT SCIATICA, UNSPECIFIED BACK PAIN LATERALITY, UNSPECIFIED CHRONICITY: ICD-10-CM

## 2020-08-07 RX ORDER — HYDROCODONE BITARTRATE AND ACETAMINOPHEN 5; 325 MG/1; MG/1
1 TABLET ORAL EVERY 6 HOURS PRN
Qty: 15 TABLET | Refills: 0 | Status: SHIPPED | OUTPATIENT
Start: 2020-08-07 | End: 2020-09-21 | Stop reason: SDUPTHER

## 2020-08-07 RX ORDER — CYCLOBENZAPRINE HCL 10 MG
10 TABLET ORAL 3 TIMES DAILY PRN
Qty: 30 TABLET | Refills: 3 | Status: SHIPPED | OUTPATIENT
Start: 2020-08-07 | End: 2020-09-15

## 2020-09-15 ENCOUNTER — APPOINTMENT (OUTPATIENT)
Dept: LAB | Facility: CLINIC | Age: 65
End: 2020-09-15
Payer: COMMERCIAL

## 2020-09-15 ENCOUNTER — OFFICE VISIT (OUTPATIENT)
Dept: FAMILY MEDICINE CLINIC | Facility: CLINIC | Age: 65
End: 2020-09-15
Payer: COMMERCIAL

## 2020-09-15 VITALS
HEIGHT: 70 IN | WEIGHT: 239.6 LBS | DIASTOLIC BLOOD PRESSURE: 74 MMHG | OXYGEN SATURATION: 97 % | SYSTOLIC BLOOD PRESSURE: 126 MMHG | HEART RATE: 60 BPM | BODY MASS INDEX: 34.3 KG/M2 | TEMPERATURE: 98.2 F | RESPIRATION RATE: 20 BRPM

## 2020-09-15 DIAGNOSIS — E11.9 CONTROLLED TYPE 2 DIABETES MELLITUS WITHOUT COMPLICATION, WITHOUT LONG-TERM CURRENT USE OF INSULIN (HCC): ICD-10-CM

## 2020-09-15 DIAGNOSIS — E78.5 HYPERLIPIDEMIA, UNSPECIFIED HYPERLIPIDEMIA TYPE: ICD-10-CM

## 2020-09-15 DIAGNOSIS — Z79.899 HIGH RISK MEDICATION USE: ICD-10-CM

## 2020-09-15 DIAGNOSIS — R52 PAIN: Primary | ICD-10-CM

## 2020-09-15 DIAGNOSIS — Z12.5 SCREENING FOR PROSTATE CANCER: ICD-10-CM

## 2020-09-15 LAB
ALBUMIN SERPL BCP-MCNC: 3.9 G/DL (ref 3.5–5)
ALP SERPL-CCNC: 58 U/L (ref 46–116)
ALT SERPL W P-5'-P-CCNC: 31 U/L (ref 12–78)
ANION GAP SERPL CALCULATED.3IONS-SCNC: 5 MMOL/L (ref 4–13)
AST SERPL W P-5'-P-CCNC: 14 U/L (ref 5–45)
BASOPHILS # BLD AUTO: 0.09 THOUSANDS/ΜL (ref 0–0.1)
BASOPHILS NFR BLD AUTO: 1 % (ref 0–1)
BILIRUB SERPL-MCNC: 0.49 MG/DL (ref 0.2–1)
BILIRUB UR QL STRIP: NEGATIVE
BUN SERPL-MCNC: 17 MG/DL (ref 5–25)
CALCIUM ALBUM COR SERPL-MCNC: 10.6 MG/DL (ref 8.3–10.1)
CALCIUM SERPL-MCNC: 10.5 MG/DL (ref 8.3–10.1)
CHLORIDE SERPL-SCNC: 106 MMOL/L (ref 100–108)
CHOLEST SERPL-MCNC: 131 MG/DL (ref 50–200)
CLARITY UR: NORMAL
CO2 SERPL-SCNC: 26 MMOL/L (ref 21–32)
COLOR UR: YELLOW
CREAT SERPL-MCNC: 0.9 MG/DL (ref 0.6–1.3)
CREAT UR-MCNC: 132 MG/DL
EOSINOPHIL # BLD AUTO: 0.46 THOUSAND/ΜL (ref 0–0.61)
EOSINOPHIL NFR BLD AUTO: 4 % (ref 0–6)
ERYTHROCYTE [DISTWIDTH] IN BLOOD BY AUTOMATED COUNT: 12.9 % (ref 11.6–15.1)
EST. AVERAGE GLUCOSE BLD GHB EST-MCNC: 151 MG/DL
GFR SERPL CREATININE-BSD FRML MDRD: 89 ML/MIN/1.73SQ M
GLUCOSE P FAST SERPL-MCNC: 120 MG/DL (ref 65–99)
GLUCOSE UR STRIP-MCNC: NEGATIVE MG/DL
HBA1C MFR BLD: 6.9 %
HCT VFR BLD AUTO: 45.7 % (ref 36.5–49.3)
HDLC SERPL-MCNC: 40 MG/DL
HGB BLD-MCNC: 14.8 G/DL (ref 12–17)
HGB UR QL STRIP.AUTO: NEGATIVE
IMM GRANULOCYTES # BLD AUTO: 0.09 THOUSAND/UL (ref 0–0.2)
IMM GRANULOCYTES NFR BLD AUTO: 1 % (ref 0–2)
KETONES UR STRIP-MCNC: NEGATIVE MG/DL
LDLC SERPL CALC-MCNC: 69 MG/DL (ref 0–100)
LEUKOCYTE ESTERASE UR QL STRIP: NEGATIVE
LYMPHOCYTES # BLD AUTO: 2.77 THOUSANDS/ΜL (ref 0.6–4.47)
LYMPHOCYTES NFR BLD AUTO: 25 % (ref 14–44)
MCH RBC QN AUTO: 31.2 PG (ref 26.8–34.3)
MCHC RBC AUTO-ENTMCNC: 32.4 G/DL (ref 31.4–37.4)
MCV RBC AUTO: 96 FL (ref 82–98)
MICROALBUMIN UR-MCNC: 13 MG/L (ref 0–20)
MICROALBUMIN/CREAT 24H UR: 10 MG/G CREATININE (ref 0–30)
MONOCYTES # BLD AUTO: 0.67 THOUSAND/ΜL (ref 0.17–1.22)
MONOCYTES NFR BLD AUTO: 6 % (ref 4–12)
NEUTROPHILS # BLD AUTO: 6.81 THOUSANDS/ΜL (ref 1.85–7.62)
NEUTS SEG NFR BLD AUTO: 63 % (ref 43–75)
NITRITE UR QL STRIP: NEGATIVE
NONHDLC SERPL-MCNC: 91 MG/DL
NRBC BLD AUTO-RTO: 0 /100 WBCS
PH UR STRIP.AUTO: 5.5 [PH]
PLATELET # BLD AUTO: 177 THOUSANDS/UL (ref 149–390)
PMV BLD AUTO: 11 FL (ref 8.9–12.7)
POTASSIUM SERPL-SCNC: 4.5 MMOL/L (ref 3.5–5.3)
PROT SERPL-MCNC: 8.1 G/DL (ref 6.4–8.2)
PROT UR STRIP-MCNC: NEGATIVE MG/DL
PSA SERPL-MCNC: 0.6 NG/ML (ref 0–4)
RBC # BLD AUTO: 4.75 MILLION/UL (ref 3.88–5.62)
SODIUM SERPL-SCNC: 137 MMOL/L (ref 136–145)
SP GR UR STRIP.AUTO: 1.03 (ref 1–1.03)
TRIGL SERPL-MCNC: 111 MG/DL
UROBILINOGEN UR QL STRIP.AUTO: 0.2 E.U./DL
WBC # BLD AUTO: 10.89 THOUSAND/UL (ref 4.31–10.16)

## 2020-09-15 PROCEDURE — 85025 COMPLETE CBC W/AUTO DIFF WBC: CPT

## 2020-09-15 PROCEDURE — 81003 URINALYSIS AUTO W/O SCOPE: CPT | Performed by: FAMILY MEDICINE

## 2020-09-15 PROCEDURE — G0103 PSA SCREENING: HCPCS

## 2020-09-15 PROCEDURE — 80061 LIPID PANEL: CPT

## 2020-09-15 PROCEDURE — 36415 COLL VENOUS BLD VENIPUNCTURE: CPT

## 2020-09-15 PROCEDURE — 82043 UR ALBUMIN QUANTITATIVE: CPT

## 2020-09-15 PROCEDURE — 82570 ASSAY OF URINE CREATININE: CPT

## 2020-09-15 PROCEDURE — 83036 HEMOGLOBIN GLYCOSYLATED A1C: CPT

## 2020-09-15 PROCEDURE — 99214 OFFICE O/P EST MOD 30 MIN: CPT | Performed by: NURSE PRACTITIONER

## 2020-09-15 PROCEDURE — 80053 COMPREHEN METABOLIC PANEL: CPT

## 2020-09-15 RX ORDER — METHOCARBAMOL 750 MG/1
750 TABLET, FILM COATED ORAL EVERY 6 HOURS PRN
Qty: 30 TABLET | Refills: 0 | Status: SHIPPED | OUTPATIENT
Start: 2020-09-15 | End: 2020-09-21 | Stop reason: SDUPTHER

## 2020-09-15 RX ORDER — METHYLPREDNISOLONE 4 MG/1
TABLET ORAL
Qty: 21 EACH | Refills: 0 | Status: SHIPPED | OUTPATIENT
Start: 2020-09-15 | End: 2020-09-30 | Stop reason: SDUPTHER
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
## 2020-09-15 NOTE — PATIENT INSTRUCTIONS
1  Obtain films and will call with results  2  Try the robaxin and medrol, Salon Pas patches   3   Schedule with PT

## 2020-09-16 ENCOUNTER — APPOINTMENT (OUTPATIENT)
Dept: RADIOLOGY | Facility: CLINIC | Age: 65
End: 2020-09-16
Payer: COMMERCIAL

## 2020-09-16 DIAGNOSIS — R52 PAIN: ICD-10-CM

## 2020-09-16 PROCEDURE — 73502 X-RAY EXAM HIP UNI 2-3 VIEWS: CPT

## 2020-09-21 ENCOUNTER — OFFICE VISIT (OUTPATIENT)
Dept: FAMILY MEDICINE CLINIC | Facility: CLINIC | Age: 65
End: 2020-09-21
Payer: COMMERCIAL

## 2020-09-21 ENCOUNTER — APPOINTMENT (OUTPATIENT)
Dept: LAB | Facility: CLINIC | Age: 65
End: 2020-09-21
Payer: COMMERCIAL

## 2020-09-21 VITALS
DIASTOLIC BLOOD PRESSURE: 78 MMHG | SYSTOLIC BLOOD PRESSURE: 128 MMHG | WEIGHT: 240.8 LBS | BODY MASS INDEX: 34.47 KG/M2 | OXYGEN SATURATION: 97 % | HEART RATE: 57 BPM | HEIGHT: 70 IN | TEMPERATURE: 97.3 F

## 2020-09-21 DIAGNOSIS — E11.9 CONTROLLED TYPE 2 DIABETES MELLITUS WITHOUT COMPLICATION, WITHOUT LONG-TERM CURRENT USE OF INSULIN (HCC): ICD-10-CM

## 2020-09-21 DIAGNOSIS — E83.52 HYPERCALCEMIA: ICD-10-CM

## 2020-09-21 DIAGNOSIS — R52 PAIN: ICD-10-CM

## 2020-09-21 DIAGNOSIS — M54.50 LOW BACK PAIN WITHOUT SCIATICA, UNSPECIFIED BACK PAIN LATERALITY, UNSPECIFIED CHRONICITY: ICD-10-CM

## 2020-09-21 DIAGNOSIS — I10 ESSENTIAL HYPERTENSION: ICD-10-CM

## 2020-09-21 DIAGNOSIS — Z00.00 ENCOUNTER FOR WELL ADULT EXAM WITHOUT ABNORMAL FINDINGS: Primary | ICD-10-CM

## 2020-09-21 LAB
25(OH)D3 SERPL-MCNC: 12.2 NG/ML (ref 30–100)
ALBUMIN SERPL BCP-MCNC: 3.9 G/DL (ref 3.5–5)
ALP SERPL-CCNC: 55 U/L (ref 46–116)
ALT SERPL W P-5'-P-CCNC: 31 U/L (ref 12–78)
ANION GAP SERPL CALCULATED.3IONS-SCNC: 4 MMOL/L (ref 4–13)
AST SERPL W P-5'-P-CCNC: 9 U/L (ref 5–45)
BILIRUB SERPL-MCNC: 0.37 MG/DL (ref 0.2–1)
BUN SERPL-MCNC: 22 MG/DL (ref 5–25)
CALCIUM SERPL-MCNC: 9.7 MG/DL (ref 8.3–10.1)
CHLORIDE SERPL-SCNC: 109 MMOL/L (ref 100–108)
CO2 SERPL-SCNC: 27 MMOL/L (ref 21–32)
CREAT SERPL-MCNC: 0.86 MG/DL (ref 0.6–1.3)
GFR SERPL CREATININE-BSD FRML MDRD: 91 ML/MIN/1.73SQ M
GLUCOSE P FAST SERPL-MCNC: 92 MG/DL (ref 65–99)
PHOSPHATE SERPL-MCNC: 3 MG/DL (ref 2.3–4.1)
POTASSIUM SERPL-SCNC: 4.3 MMOL/L (ref 3.5–5.3)
PROT SERPL-MCNC: 7.8 G/DL (ref 6.4–8.2)
PTH-INTACT SERPL-MCNC: 116.7 PG/ML (ref 18.4–80.1)
SODIUM SERPL-SCNC: 140 MMOL/L (ref 136–145)
URATE SERPL-MCNC: 3.6 MG/DL (ref 4.2–8)

## 2020-09-21 PROCEDURE — 82306 VITAMIN D 25 HYDROXY: CPT

## 2020-09-21 PROCEDURE — 83970 ASSAY OF PARATHORMONE: CPT

## 2020-09-21 PROCEDURE — 84550 ASSAY OF BLOOD/URIC ACID: CPT

## 2020-09-21 PROCEDURE — 80053 COMPREHEN METABOLIC PANEL: CPT

## 2020-09-21 PROCEDURE — 99397 PER PM REEVAL EST PAT 65+ YR: CPT | Performed by: FAMILY MEDICINE

## 2020-09-21 PROCEDURE — 84100 ASSAY OF PHOSPHORUS: CPT

## 2020-09-21 PROCEDURE — 36415 COLL VENOUS BLD VENIPUNCTURE: CPT

## 2020-09-21 RX ORDER — METHOCARBAMOL 750 MG/1
750 TABLET, FILM COATED ORAL EVERY 6 HOURS PRN
Qty: 30 TABLET | Refills: 0 | Status: SHIPPED | OUTPATIENT
Start: 2020-09-21 | End: 2020-11-18 | Stop reason: SDUPTHER

## 2020-09-21 RX ORDER — HYDROCODONE BITARTRATE AND ACETAMINOPHEN 5; 325 MG/1; MG/1
1 TABLET ORAL EVERY 6 HOURS PRN
Qty: 30 TABLET | Refills: 0 | Status: SHIPPED | OUTPATIENT
Start: 2020-09-21 | End: 2020-11-18 | Stop reason: SDUPTHER

## 2020-09-21 NOTE — PROGRESS NOTES
Assessment/Plan:         Diagnoses and all orders for this visit:    Encounter for well adult exam without abnormal findings    Controlled type 2 diabetes mellitus without complication, without long-term current use of insulin ([facility] )    Essential hypertension    Hypercalcemia  -     Comprehensive metabolic panel; Future  -     PTH, intact; Future  -     Vitamin D 25 hydroxy; Future  -     Phosphorus; Future  -     Uric acid; Future    BMI 34 0-34 9,adult    Low back pain without sciatica, unspecified back pain laterality, unspecified chronicity  -     HYDROcodone-acetaminophen (NORCO) 5-325 mg per tablet; Take 1 tablet by mouth every 6 (six) hours as needed for painMax Daily Amount: 4 tablets    Pain  -     methocarbamol (ROBAXIN) 750 mg tablet; Take 1 tablet (750 mg total) by mouth every 6 (six) hours as needed for muscle spasms      patient is blood calcium came back slightly elevated  We will check lab work to investigate this  Otherwise his sugars are controlled  Refilled his medications  He has no acute complaints today  Follow-up in 6 months for his diabetic checkup        Chief Complaint   Patient presents with    Follow-up     Follow up 6 months Diabetes        Patient ID: Joanne Alba is a 72 y o  male  Patient is here for diabetic check  Reports no acute complaints related to his chronic medical conditions today      The following portions of the patient's history were reviewed and updated as appropriate: allergies, current medications, past family history, past medical history, past social history, past surgical history and problem list     Review of Systems   Constitutional: Negative  HENT: Negative  Eyes: Negative  Respiratory: Negative  Cardiovascular: Negative  Gastrointestinal: Negative  Endocrine: Negative  Genitourinary: Negative  Musculoskeletal: Negative  Skin: Negative  Allergic/Immunologic: Negative  Neurological: Negative  Hematological: Negative  Psychiatric/Behavioral: Negative  All other systems reviewed and are negative  Objective:    Vitals:    09/21/20 1049 09/21/20 1114   BP: 140/80 128/78   BP Location: Left arm    Patient Position: Sitting    Cuff Size: Standard    Pulse: 57    Temp: (!) 97 3 °F (36 3 °C)    TempSrc: Tympanic    SpO2: 97%    Weight: 109 kg (240 lb 12 8 oz)    Height: 5' 10" (1 778 m)           Physical Exam  Vitals signs and nursing note reviewed  Constitutional:       General: He is not in acute distress  Appearance: He is well-developed  HENT:      Head: Normocephalic  Right Ear: External ear normal       Left Ear: External ear normal       Nose: Nose normal    Eyes:      General:         Right eye: No discharge  Left eye: No discharge  Conjunctiva/sclera: Conjunctivae normal       Pupils: Pupils are equal, round, and reactive to light  Neck:      Musculoskeletal: Normal range of motion  Cardiovascular:      Rate and Rhythm: Normal rate and regular rhythm  Pulses: no weak pulses          Dorsalis pedis pulses are 2+ on the right side and 2+ on the left side  Posterior tibial pulses are 2+ on the right side and 2+ on the left side  Heart sounds: Normal heart sounds  Pulmonary:      Effort: Pulmonary effort is normal       Breath sounds: Normal breath sounds  Abdominal:      General: Bowel sounds are normal  There is no distension  Palpations: Abdomen is soft  Tenderness: There is no abdominal tenderness  Musculoskeletal: Normal range of motion  Feet:      Right foot:      Skin integrity: No ulcer, skin breakdown, erythema, warmth, callus or dry skin  Left foot:      Skin integrity: No ulcer, skin breakdown, erythema, warmth, callus or dry skin  Skin:     General: Skin is warm and dry  Findings: No rash  Neurological:      Mental Status: He is alert and oriented to person, place, and time  Cranial Nerves: No cranial nerve deficit  Psychiatric:         Behavior: Behavior normal          Thought Content: Thought content normal          Judgment: Judgment normal          Diabetic Foot Exam    Patient's shoes and socks removed  Right Foot/Ankle   Right Foot Inspection  Skin Exam: skin normal skin not intact, no dry skin, no warmth, no callus, no erythema, no maceration, no abnormal color, no pre-ulcer, no ulcer and no callus                          Toe Exam: ROM and strength within normal limits  Sensory   Vibration: intact  Proprioception: intact   Monofilament testing: intact  Vascular  Capillary refills: < 3 seconds  The right DP pulse is 2+  The right PT pulse is 2+  Left Foot/Ankle  Left Foot Inspection  Skin Exam: skin normalskin not intact, no dry skin, no warmth, no erythema, no maceration, normal color, no pre-ulcer, no ulcer and no callus                         Toe Exam: ROM and strength within normal limits                   Sensory   Vibration: intact  Proprioception: intact  Monofilament: intact  Vascular  Capillary refills: < 3 seconds  The left DP pulse is 2+  The left PT pulse is 2+  Assign Risk Category:  No deformity present; No loss of protective sensation; No weak pulses       Risk: 0        BMI Counseling: Body mass index is 34 55 kg/m²  The BMI is above normal  Nutrition recommendations include reducing portion sizes, decreasing overall calorie intake, 3-5 servings of fruits/vegetables daily, reducing fast food intake, consuming healthier snacks, decreasing soda and/or juice intake, moderation in carbohydrate intake, increasing intake of lean protein, reducing intake of saturated fat and trans fat and reducing intake of cholesterol  Exercise recommendations include exercising 3-5 times per week

## 2020-09-21 NOTE — PATIENT INSTRUCTIONS
Obesity   AMBULATORY CARE:   Obesity  is when your body mass index (BMI) is greater than 30  Your healthcare provider will use your height and weight to measure your BMI  The risks of obesity include  many health problems, such as injuries or physical disability  You may need tests to check for the following:  · Diabetes     · High blood pressure or high cholesterol     · Heart disease     · Gallbladder or liver disease     · Cancer of the colon, breast, prostate, liver, or kidney     · Sleep apnea     · Arthritis or gout  Seek care immediately if:   · You have a severe headache, confusion, or difficulty speaking  · You have weakness on one side of your body  · You have chest pain, sweating, or shortness of breath  Contact your healthcare provider if:   · You have symptoms of gallbladder or liver disease, such as pain in your upper abdomen  · You have knee or hip pain and discomfort while walking  · You have symptoms of diabetes, such as intense hunger and thirst, and frequent urination  · You have symptoms of sleep apnea, such as snoring or daytime sleepiness  · You have questions or concerns about your condition or care  Treatment for obesity  focuses on helping you lose weight to improve your health  Even a small decrease in BMI can reduce the risk for many health problems  Your healthcare provider will help you set a weight-loss goal   · Lifestyle changes  are the first step in treating obesity  These include making healthy food choices and getting regular physical activity  Your healthcare provider may suggest a weight-loss program that involves coaching, education, and therapy  · Medicine  may help you lose weight when it is used with a healthy diet and physical activity  · Surgery  can help you lose weight if you are very obese and have other health problems  There are several types of weight-loss surgery  Ask your healthcare provider for more information    Be successful losing weight:   · Set small, realistic goals  An example of a small goal is to walk for 20 minutes 5 days a week  Anther goal is to lose 5% of your body weight  · Tell friends, family members, and coworkers about your goals  and ask for their support  Ask a friend to lose weight with you, or join a weight-loss support group  · Identify foods or triggers that may cause you to overeat , and find ways to avoid them  Remove tempting high-calorie foods from your home and workplace  Place a bowl of fresh fruit on your kitchen counter  If stress causes you to eat, then find other ways to cope with stress  · Keep a diary to track what you eat and drink  Also write down how many minutes of physical activity you do each day  Weigh yourself once a week and record it in your diary  Eating changes: You will need to eat 500 to 1,000 fewer calories each day than you currently eat to lose 1 to 2 pounds a week  The following changes will help you cut calories:  · Eat smaller portions  Use small plates, no larger than 9 inches in diameter  Fill your plate half full of fruits and vegetables  Measure your food using measuring cups until you know what a serving size looks like  · Eat 3 meals and 1 or 2 snacks each day  Plan your meals in advance  Rowdy Masterson and eat at home most of the time  Eat slowly  · Eat fruits and vegetables at every meal   They are low in calories and high in fiber, which makes you feel full  Do not add butter, margarine, or cream sauce to vegetables  Use herbs to season steamed vegetables  · Eat less fat and fewer fried foods  Eat more baked or grilled chicken and fish  These protein sources are lower in calories and fat than red meat  Limit fast food  Dress your salads with olive oil and vinegar instead of bottled dressing  · Limit the amount of sugar you eat  Do not drink sugary beverages  Limit alcohol  Activity changes:  Physical activity is good for your body in many ways   It helps you burn calories and build strong muscles  It decreases stress and depression, and improves your mood  It can also help you sleep better  Talk to your healthcare provider before you begin an exercise program   · Exercise for at least 30 minutes 5 days a week  Start slowly  Set aside time each day for physical activity that you enjoy and that is convenient for you  It is best to do both weight training and an activity that increases your heart rate, such as walking, bicycling, or swimming  · Find ways to be more active  Do yard work and housecleaning  Walk up the stairs instead of using elevators  Spend your leisure time going to events that require walking, such as outdoor festivals or fairs  This extra physical activity can help you lose weight and keep it off  Follow up with your healthcare provider as directed: You may need to meet with a dietitian  Write down your questions so you remember to ask them during your visits  © 2017 [employer] Information is for End User's use only and may not be sold, redistributed or otherwise used for commercial purposes  All illustrations and images included in CareNotes® are the copyrighted property of [employer]  or [employer]  The above information is an  only  It is not intended as medical advice for individual conditions or treatments  Talk to your doctor, nurse or pharmacist before following any medical regimen to see if it is safe and effective for you  Weight Management   AMBULATORY CARE:   Why it is important to manage your weight:  Being overweight increases your risk of health conditions such as heart disease, high blood pressure, type 2 diabetes, and certain types of cancer  It can also increase your risk for osteoarthritis, sleep apnea, and other respiratory problems  Aim for a slow, steady weight loss  Even a small amount of weight loss can lower your risk of health problems    How to lose weight safely:  A safe and healthy way to lose weight is to eat fewer calories and get regular exercise  You can lose up about 1 pound a week by decreasing the number of calories you eat by 500 calories each day  You can decrease calories by eating smaller portion sizes or by cutting out high-calorie foods  Read labels to find out how many calories are in the foods you eat  You can also burn calories with exercise such as walking, swimming, or biking  You will be more likely to keep weight off if you make these changes part of your lifestyle  Healthy meal plan for weight management:  A healthy meal plan includes a variety of foods, contains fewer calories, and helps you stay healthy  A healthy meal plan includes the following:  · Eat whole-grain foods more often  A healthy meal plan should contain fiber  Fiber is the part of grains, fruits, and vegetables that is not broken down by your body  Whole-grain foods are healthy and provide extra fiber in your diet  Some examples of whole-grain foods are whole-wheat breads and pastas, oatmeal, brown rice, and bulgur  · Eat a variety of vegetables every day  Include dark, leafy greens such as spinach, kale, ashvin greens, and mustard greens  Eat yellow and orange vegetables such as carrots, sweet potatoes, and winter squash  · Eat a variety of fruits every day  Choose fresh or canned fruit (canned in its own juice or light syrup) instead of juice  Fruit juice has very little or no fiber  · Eat low-fat dairy foods  Drink fat-free (skim) milk or 1% milk  Eat fat-free yogurt and low-fat cottage cheese  Try low-fat cheeses such as mozzarella and other reduced-fat cheeses  · Choose meat and other protein foods that are low in fat  Choose beans or other legumes such as split peas or lentils  Choose fish, skinless poultry (chicken or turkey), or lean cuts of red meat (beef or pork)  Before you cook meat or poultry, cut off any visible fat  · Use less fat and oil  Try baking foods instead of frying them  Add less fat, such as margarine, sour cream, regular salad dressing and mayonnaise to foods  Eat fewer high-fat foods  Some examples of high-fat foods include french fries, doughnuts, ice cream, and cakes  · Eat fewer sweets  Limit foods and drinks that are high in sugar  This includes candy, cookies, regular soda, and sweetened drinks  Ways to decrease calories:   · Eat smaller portions  ¨ Use a small plate with smaller servings  ¨ Do not eat second helpings  ¨ When you eat at a restaurant, ask for a box and place half of your meal in the box before you eat  ¨ Share an entrée with someone else  · Replace high-calorie snacks with healthy, low-calorie snacks  ¨ Choose fresh fruit, vegetables, fat-free rice cakes, or air-popped popcorn instead of potato chips, nuts, or chocolate  ¨ Choose water or calorie-free drinks instead of soda or sweetened drinks  · Eat regular meals  Skipping meals can lead to overeating later in the day  Eat a healthy snack in place of a meal if you do not have time to eat a regular meal      · Do not shop for groceries when you are hungry  You may be more likely to make unhealthy food choices  Take a grocery list of healthy foods and shop after you have eaten  Exercise:  Exercise at least 30 minutes per day on most days of the week  Some examples of exercise include walking, biking, dancing, and swimming  You can also fit in more physical activity by taking the stairs instead of the elevator or parking farther away from stores  Ask your healthcare provider about the best exercise plan for you  Other things to consider as you try to lose weight:   · Be aware of situations that may give you the urge to overeat, such as eating while watching television  Find ways to avoid these situations  For example, read a book, go for a walk, or do crafts      · Meet with a weight loss support group or friends who are also trying to lose weight  This may help you stay motivated to continue working on your weight loss goals  © 2017 [employer] Information is for End User's use only and may not be sold, redistributed or otherwise used for commercial purposes  All illustrations and images included in CareNotes® are the copyrighted property of [employer]  or Hemanth Sheldon  The above information is an  only  It is not intended as medical advice for individual conditions or treatments  Talk to your doctor, nurse or pharmacist before following any medical regimen to see if it is safe and effective for you

## 2020-09-30 ENCOUNTER — OFFICE VISIT (OUTPATIENT)
Dept: FAMILY MEDICINE CLINIC | Facility: CLINIC | Age: 65
End: 2020-09-30
Payer: COMMERCIAL

## 2020-09-30 VITALS
BODY MASS INDEX: 34.65 KG/M2 | HEIGHT: 70 IN | WEIGHT: 242 LBS | HEART RATE: 65 BPM | DIASTOLIC BLOOD PRESSURE: 70 MMHG | SYSTOLIC BLOOD PRESSURE: 122 MMHG | TEMPERATURE: 97.2 F | OXYGEN SATURATION: 98 %
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
## 2020-09-30 DIAGNOSIS — M25.552 LEFT HIP PAIN: ICD-10-CM

## 2020-09-30 DIAGNOSIS — R52 PAIN: ICD-10-CM

## 2020-09-30 PROCEDURE — 99213 OFFICE O/P EST LOW 20 MIN: CPT | Performed by: FAMILY MEDICINE

## 2020-09-30 RX ORDER — METHYLPREDNISOLONE 4 MG/1
TABLET ORAL
Qty: 21 EACH | Refills: 0 | Status: SHIPPED | OUTPATIENT
Start: 2020-09-30 | End: 2020-10-22 | Stop reason: SDUPTHER
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
## 2020-10-06 ENCOUNTER — OFFICE VISIT (OUTPATIENT)
Dept: SLEEP CENTER | Facility: HOSPITAL | Age: 65
End: 2020-10-06
Payer: COMMERCIAL

## 2020-10-06 VITALS
HEART RATE: 54 BPM | TEMPERATURE: 97.3 F | SYSTOLIC BLOOD PRESSURE: 120 MMHG | DIASTOLIC BLOOD PRESSURE: 70 MMHG | BODY MASS INDEX: 34.36 KG/M2 | WEIGHT: 240 LBS | HEIGHT: 70 IN

## 2020-10-06 DIAGNOSIS — R45.86 MOOD DISTURBANCE: ICD-10-CM

## 2020-10-06 DIAGNOSIS — E11.9 CONTROLLED TYPE 2 DIABETES MELLITUS WITHOUT COMPLICATION, WITHOUT LONG-TERM CURRENT USE OF INSULIN (HCC): ICD-10-CM

## 2020-10-06 DIAGNOSIS — I10 ESSENTIAL HYPERTENSION: ICD-10-CM

## 2020-10-06 DIAGNOSIS — J20.9 COPD (CHRONIC OBSTRUCTIVE PULMONARY DISEASE) WITH ACUTE BRONCHITIS (HCC): ICD-10-CM

## 2020-10-06 DIAGNOSIS — G47.33 OBSTRUCTIVE SLEEP APNEA: Primary | ICD-10-CM

## 2020-10-06 DIAGNOSIS — E66.9 OBESITY (BMI 30-39.9): ICD-10-CM

## 2020-10-06 DIAGNOSIS — J44.0 COPD (CHRONIC OBSTRUCTIVE PULMONARY DISEASE) WITH ACUTE BRONCHITIS (HCC): ICD-10-CM

## 2020-10-06 PROCEDURE — 99214 OFFICE O/P EST MOD 30 MIN: CPT | Performed by: INTERNAL MEDICINE

## 2020-10-07 ENCOUNTER — TELEPHONE (OUTPATIENT)
Dept: SLEEP CENTER | Facility: CLINIC | Age: 65
End: 2020-10-07

## 2020-10-22 ENCOUNTER — OFFICE VISIT (OUTPATIENT)
Dept: FAMILY MEDICINE CLINIC | Facility: CLINIC | Age: 65
End: 2020-10-22
Payer: COMMERCIAL

## 2020-10-22 ENCOUNTER — APPOINTMENT (OUTPATIENT)
Dept: RADIOLOGY | Facility: CLINIC | Age: 65
End: 2020-10-22
Payer: COMMERCIAL

## 2020-10-22 VITALS
HEART RATE: 64 BPM | HEIGHT: 70 IN | TEMPERATURE: 97.8 F | WEIGHT: 248 LBS | SYSTOLIC BLOOD PRESSURE: 134 MMHG | BODY MASS INDEX: 35.5 KG/M2 | DIASTOLIC BLOOD PRESSURE: 84 MMHG | OXYGEN SATURATION: 97 %

## 2020-10-22 DIAGNOSIS — M54.32 SCIATICA OF LEFT SIDE: Primary | ICD-10-CM

## 2020-10-22 DIAGNOSIS — M54.32 SCIATICA OF LEFT SIDE: ICD-10-CM

## 2020-10-22 DIAGNOSIS — R52 PAIN: ICD-10-CM

## 2020-10-22 PROCEDURE — 99213 OFFICE O/P EST LOW 20 MIN: CPT | Performed by: FAMILY MEDICINE

## 2020-10-22 PROCEDURE — 72110 X-RAY EXAM L-2 SPINE 4/>VWS: CPT

## 2020-10-22 RX ORDER — METHYLPREDNISOLONE 4 MG/1
TABLET ORAL
Qty: 21 EACH | Refills: 0 | Status: SHIPPED | OUTPATIENT
Start: 2020-10-22 | End: 2020-12-30

## 2020-11-04 DIAGNOSIS — M25.552 LEFT HIP PAIN: Primary | ICD-10-CM

## 2020-11-09 ENCOUNTER — CONSULT (OUTPATIENT)
Dept: OBGYN CLINIC | Facility: CLINIC | Age: 65
End: 2020-11-09
Payer: COMMERCIAL

## 2020-11-09 VITALS
DIASTOLIC BLOOD PRESSURE: 72 MMHG | TEMPERATURE: 98.6 F | WEIGHT: 241 LBS | SYSTOLIC BLOOD PRESSURE: 120 MMHG | HEIGHT: 70 IN | BODY MASS INDEX: 34.5 KG/M2

## 2020-11-09 DIAGNOSIS — M25.552 LEFT HIP PAIN: ICD-10-CM

## 2020-11-09 DIAGNOSIS — M54.40 LOW BACK PAIN WITH SCIATICA, SCIATICA LATERALITY UNSPECIFIED, UNSPECIFIED BACK PAIN LATERALITY, UNSPECIFIED CHRONICITY: Primary | ICD-10-CM

## 2020-11-09 PROCEDURE — 99213 OFFICE O/P EST LOW 20 MIN: CPT | Performed by: ORTHOPAEDIC SURGERY

## 2020-11-18 ENCOUNTER — CONSULT (OUTPATIENT)
Dept: PAIN MEDICINE | Facility: CLINIC | Age: 65
End: 2020-11-18
Payer: COMMERCIAL

## 2020-11-18 VITALS
DIASTOLIC BLOOD PRESSURE: 70 MMHG | SYSTOLIC BLOOD PRESSURE: 120 MMHG | BODY MASS INDEX: 34.5 KG/M2 | TEMPERATURE: 98.7 F | WEIGHT: 241 LBS | HEIGHT: 70 IN

## 2020-11-18 DIAGNOSIS — M25.552 LEFT HIP PAIN: Primary | ICD-10-CM

## 2020-11-18 DIAGNOSIS — E11.9 CONTROLLED TYPE 2 DIABETES MELLITUS WITHOUT COMPLICATION, WITHOUT LONG-TERM CURRENT USE OF INSULIN (HCC): ICD-10-CM

## 2020-11-18 DIAGNOSIS — M54.50 LOW BACK PAIN WITHOUT SCIATICA, UNSPECIFIED BACK PAIN LATERALITY, UNSPECIFIED CHRONICITY: ICD-10-CM

## 2020-11-18 DIAGNOSIS — M24.852 LEFT HIP CREPITUS: ICD-10-CM

## 2020-11-18 DIAGNOSIS — Z79.01 CHRONIC ANTICOAGULATION: ICD-10-CM

## 2020-11-18 DIAGNOSIS — M47.816 LUMBAR SPONDYLOSIS: ICD-10-CM

## 2020-11-18 DIAGNOSIS — R52 PAIN: ICD-10-CM

## 2020-11-18 PROCEDURE — 99204 OFFICE O/P NEW MOD 45 MIN: CPT | Performed by: ANESTHESIOLOGY

## 2020-11-18 RX ORDER — HYDROCODONE BITARTRATE AND ACETAMINOPHEN 5; 325 MG/1; MG/1
1 TABLET ORAL EVERY 6 HOURS PRN
Qty: 30 TABLET | Refills: 0 | Status: SHIPPED | OUTPATIENT
Start: 2020-11-18 | End: 2021-01-26 | Stop reason: SDUPTHER

## 2020-11-18 RX ORDER — METHOCARBAMOL 750 MG/1
750 TABLET, FILM COATED ORAL EVERY 6 HOURS PRN
Qty: 30 TABLET | Refills: 0 | Status: SHIPPED | OUTPATIENT
Start: 2020-11-18 | End: 2020-12-22 | Stop reason: SDUPTHER

## 2020-12-03 ENCOUNTER — HOSPITAL ENCOUNTER (OUTPATIENT)
Dept: RADIOLOGY | Facility: CLINIC | Age: 65
Discharge: HOME | End: 2020-12-03
Attending: ANESTHESIOLOGY
Payer: COMMERCIAL

## 2020-12-03 VITALS
RESPIRATION RATE: 20 BRPM | SYSTOLIC BLOOD PRESSURE: 146 MMHG | DIASTOLIC BLOOD PRESSURE: 85 MMHG | TEMPERATURE: 98.7 F | OXYGEN SATURATION: 98 % | HEART RATE: 58 BPM

## 2020-12-03 DIAGNOSIS — M24.852 LEFT HIP CREPITUS: ICD-10-CM

## 2020-12-03 PROCEDURE — 77002 NEEDLE LOCALIZATION BY XRAY: CPT

## 2020-12-03 PROCEDURE — 20610 DRAIN/INJ JOINT/BURSA W/O US: CPT | Performed by: ANESTHESIOLOGY

## 2020-12-03 PROCEDURE — 77002 NEEDLE LOCALIZATION BY XRAY: CPT | Performed by: ANESTHESIOLOGY

## 2020-12-03 RX ORDER — LIDOCAINE HYDROCHLORIDE 10 MG/ML
5 INJECTION, SOLUTION EPIDURAL; INFILTRATION; INTRACAUDAL; PERINEURAL ONCE
Status: COMPLETED | OUTPATIENT
Start: 2020-12-03 | End: 2020-12-03

## 2020-12-03 RX ORDER — METHYLPREDNISOLONE ACETATE 80 MG/ML
80 INJECTION, SUSPENSION INTRA-ARTICULAR; INTRALESIONAL; INTRAMUSCULAR; PARENTERAL; SOFT TISSUE ONCE
Status: COMPLETED | OUTPATIENT
Start: 2020-12-03 | End: 2020-12-03

## 2020-12-03 RX ORDER — BUPIVACAINE HCL/PF 2.5 MG/ML
10 VIAL (ML) INJECTION ONCE
Status: DISCONTINUED | OUTPATIENT
Start: 2020-12-03 | End: 2020-12-03

## 2020-12-03 RX ORDER — BUPIVACAINE HYDROCHLORIDE 7.5 MG/ML
10 INJECTION, SOLUTION EPIDURAL; RETROBULBAR ONCE
Status: COMPLETED | OUTPATIENT
Start: 2020-12-03 | End: 2020-12-03

## 2020-12-03 RX ADMIN — IOHEXOL 1 ML: 300 INJECTION, SOLUTION INTRAVENOUS at 14:59

## 2020-12-03 RX ADMIN — LIDOCAINE HYDROCHLORIDE 3 ML: 10 INJECTION, SOLUTION EPIDURAL; INFILTRATION; INTRACAUDAL; PERINEURAL at 14:59

## 2020-12-03 RX ADMIN — BUPIVACAINE HYDROCHLORIDE 2 ML: 7.5 INJECTION, SOLUTION EPIDURAL; RETROBULBAR at 15:00

## 2020-12-03 RX ADMIN — METHYLPREDNISOLONE ACETATE 80 MG: 80 INJECTION, SUSPENSION INTRA-ARTICULAR; INTRALESIONAL; INTRAMUSCULAR; SOFT TISSUE at 15:00

## 2020-12-10 ENCOUNTER — TELEPHONE (OUTPATIENT)
Dept: PAIN MEDICINE | Facility: CLINIC | Age: 65
End: 2020-12-10

## 2020-12-22 DIAGNOSIS — R52 PAIN: ICD-10-CM

## 2020-12-22 RX ORDER — METHOCARBAMOL 750 MG/1
750 TABLET, FILM COATED ORAL EVERY 6 HOURS PRN
Qty: 30 TABLET | Refills: 2 | Status: SHIPPED | OUTPATIENT
Start: 2020-12-22 | End: 2021-01-26 | Stop reason: SDUPTHER

## 2020-12-30 PROBLEM — M16.12 LOCALIZED OSTEOARTHROSIS OF LEFT HIP: Status: ACTIVE | Noted: 2020-12-30

## 2020-12-31 ENCOUNTER — TELEMEDICINE (OUTPATIENT)
Dept: PAIN MEDICINE | Facility: CLINIC | Age: 65
End: 2020-12-31
Payer: COMMERCIAL

## 2020-12-31 DIAGNOSIS — M54.40 LOW BACK PAIN WITH SCIATICA, SCIATICA LATERALITY UNSPECIFIED, UNSPECIFIED BACK PAIN LATERALITY, UNSPECIFIED CHRONICITY: ICD-10-CM

## 2020-12-31 DIAGNOSIS — M16.12 LOCALIZED OSTEOARTHROSIS OF LEFT HIP: ICD-10-CM

## 2020-12-31 DIAGNOSIS — M54.16 LUMBAR RADICULOPATHY: ICD-10-CM

## 2020-12-31 DIAGNOSIS — M25.552 LEFT HIP PAIN: Primary | ICD-10-CM

## 2020-12-31 PROCEDURE — 99214 OFFICE O/P EST MOD 30 MIN: CPT | Performed by: NURSE PRACTITIONER

## 2021-01-13 ENCOUNTER — TELEPHONE (OUTPATIENT)
Dept: PAIN MEDICINE | Facility: CLINIC | Age: 66
End: 2021-01-13

## 2021-01-13 ENCOUNTER — HOSPITAL ENCOUNTER (OUTPATIENT)
Dept: CT IMAGING | Facility: HOSPITAL | Age: 66
Discharge: HOME | End: 2021-01-13
Payer: COMMERCIAL

## 2021-01-13 ENCOUNTER — TRANSCRIBE ORDERS (OUTPATIENT)
Dept: ADMINISTRATIVE | Facility: HOSPITAL | Age: 66
End: 2021-01-13

## 2021-01-13 ENCOUNTER — APPOINTMENT (OUTPATIENT)
Dept: LAB | Facility: CLINIC | Age: 66
End: 2021-01-13
Payer: COMMERCIAL

## 2021-01-13 DIAGNOSIS — M54.50 CHRONIC LEFT-SIDED LOW BACK PAIN WITHOUT SCIATICA: Primary | ICD-10-CM

## 2021-01-13 DIAGNOSIS — M25.552 LEFT HIP PAIN: ICD-10-CM

## 2021-01-13 DIAGNOSIS — M54.40 LOW BACK PAIN WITH SCIATICA, SCIATICA LATERALITY UNSPECIFIED, UNSPECIFIED BACK PAIN LATERALITY, UNSPECIFIED CHRONICITY: ICD-10-CM

## 2021-01-13 DIAGNOSIS — G89.29 CHRONIC LEFT-SIDED LOW BACK PAIN WITHOUT SCIATICA: Primary | ICD-10-CM

## 2021-01-13 DIAGNOSIS — E11.649 UNCONTROLLED TYPE 2 DIABETES MELLITUS WITH HYPOGLYCEMIA WITHOUT COMA (HCC): ICD-10-CM

## 2021-01-13 DIAGNOSIS — M51.27 HERNIATED NUCLEUS PULPOSUS, L5-S1: ICD-10-CM

## 2021-01-13 DIAGNOSIS — I25.119 ATHEROSCLEROSIS OF NATIVE CORONARY ARTERY WITH ANGINA PECTORIS, UNSPECIFIED WHETHER NATIVE OR TRANSPLANTED HEART (HCC): ICD-10-CM

## 2021-01-13 DIAGNOSIS — M54.16 LUMBAR RADICULOPATHY: ICD-10-CM

## 2021-01-13 DIAGNOSIS — I65.23 BILATERAL CAROTID ARTERY OCCLUSION: ICD-10-CM

## 2021-01-13 DIAGNOSIS — E78.2 MIXED HYPERLIPIDEMIA: Primary | ICD-10-CM

## 2021-01-13 DIAGNOSIS — M48.061 LUMBAR FORAMINAL STENOSIS: ICD-10-CM

## 2021-01-13 DIAGNOSIS — E78.2 MIXED HYPERLIPIDEMIA: ICD-10-CM

## 2021-01-13 LAB
ALBUMIN SERPL BCP-MCNC: 3.8 G/DL (ref 3.5–5)
ALP SERPL-CCNC: 54 U/L (ref 46–116)
ALT SERPL W P-5'-P-CCNC: 36 U/L (ref 12–78)
AST SERPL W P-5'-P-CCNC: 16 U/L (ref 5–45)
BILIRUB DIRECT SERPL-MCNC: 0.12 MG/DL (ref 0–0.2)
BILIRUB SERPL-MCNC: 0.38 MG/DL (ref 0.2–1)
CK SERPL-CCNC: 85 U/L (ref 39–308)
PROT SERPL-MCNC: 7.2 G/DL (ref 6.4–8.2)
TSH SERPL DL<=0.05 MIU/L-ACNC: 2.23 UIU/ML (ref 0.36–3.74)

## 2021-01-13 PROCEDURE — 72131 CT LUMBAR SPINE W/O DYE: CPT

## 2021-01-13 PROCEDURE — 84443 ASSAY THYROID STIM HORMONE: CPT

## 2021-01-13 PROCEDURE — 36415 COLL VENOUS BLD VENIPUNCTURE: CPT

## 2021-01-13 PROCEDURE — G1004 CDSM NDSC: HCPCS

## 2021-01-13 PROCEDURE — 80076 HEPATIC FUNCTION PANEL: CPT

## 2021-01-13 PROCEDURE — 82550 ASSAY OF CK (CPK): CPT

## 2021-01-13 NOTE — TELEPHONE ENCOUNTER
S/w pt, advised of above  Pt verbalized understanding, stated that he is taking plavix per Dr Samara Pope  Advised pt, will fu w/ Dr Samara Pope and cb to advise / schedule  Pt questioned a   Stated that he has had procedures in the past w/ SL and did not have a   Pt stated that he does not have a  and would need to schedule an uber  Advised pt, will fu w/ SL / DG and advise  Pt verbalized understanding and appreciation  Plavix hold faxed to Dr Samara Pope for review       Orders please

## 2021-01-13 NOTE — TELEPHONE ENCOUNTER
I put the order in as requested for the Left L5 & S1 TFESI  I would discuss the  situation with SL  Thank you

## 2021-01-13 NOTE — TELEPHONE ENCOUNTER
Can you please call the patient and let him know that his CT scan of the lumbar spine showed an L5-S1 disc herniation with SEVERE left and Mild Right foraminal stenosis  Please advise the patient that this would correlate with his left lateral and post leg pain  If he wants we could proceed with a Left L5 & S1 TFESI with Dr Lana Matthews?

## 2021-01-14 NOTE — TELEPHONE ENCOUNTER
Received ok to hold plavix signed by Dr Luis Geronimo on 1/13/2021    Per SL - ok to schedule without   Pt will need to sign an ama form       Forwarding to [address] [place] [address] to schedule, please coordinate w/ nursing

## 2021-01-15 NOTE — TELEPHONE ENCOUNTER
Pt scheduled for 1/29/2021 8:45 Plavix hold ok to hold per Dr Atif Alvarado      Call got disconnected         pre op instructions   Went over pre procedure instructions, no vaccinations 2 weeks prior/post proc, NPO 1 hr prior, if sick or on abx needs to call to rs, wear loose, comf clothing- no buttons/zippers, needs   Pt verbalized understanding  covid disclaimer:   COVID disclaimer/ screening completed  Pt states they have NOT had COVID  Because of possible immunosuppression due to steroid, pt is aware that he should practice more strict social distancing, masking, handwashing for 2-3 weeks following procedure  Reviewed check in process with pt- will enter building no earlier than arrival time given, agreed to wear mask for duration of appt,  will wait in car

## 2021-01-15 NOTE — TELEPHONE ENCOUNTER
S/w pt, advised to hold plavix 1/22 - 1/29 s/p procedure  Pt verbalized understanding and denied additional blood thinning medication

## 2021-01-26 DIAGNOSIS — M54.50 LOW BACK PAIN WITHOUT SCIATICA, UNSPECIFIED BACK PAIN LATERALITY, UNSPECIFIED CHRONICITY: ICD-10-CM

## 2021-01-26 DIAGNOSIS — R52 PAIN: ICD-10-CM

## 2021-01-26 DIAGNOSIS — E11.8 TYPE 2 DIABETES MELLITUS WITH COMPLICATION, WITHOUT LONG-TERM CURRENT USE OF INSULIN (HCC): ICD-10-CM

## 2021-01-26 RX ORDER — METHOCARBAMOL 750 MG/1
750 TABLET, FILM COATED ORAL EVERY 6 HOURS PRN
Qty: 30 TABLET | Refills: 0 | Status: SHIPPED | OUTPATIENT
Start: 2021-01-26 | End: 2021-03-05 | Stop reason: SDUPTHER

## 2021-01-26 RX ORDER — GLIMEPIRIDE 1 MG/1
TABLET ORAL
Qty: 180 TABLET | Refills: 1 | OUTPATIENT
Start: 2021-01-26

## 2021-01-26 RX ORDER — HYDROCODONE BITARTRATE AND ACETAMINOPHEN 5; 325 MG/1; MG/1
1 TABLET ORAL EVERY 6 HOURS PRN
Qty: 30 TABLET | Refills: 0 | Status: SHIPPED | OUTPATIENT
Start: 2021-01-26 | End: 2021-03-31 | Stop reason: SDUPTHER

## 2021-01-29 ENCOUNTER — HOSPITAL ENCOUNTER (OUTPATIENT)
Dept: RADIOLOGY | Facility: CLINIC | Age: 66
Discharge: HOME | End: 2021-01-29
Attending: ANESTHESIOLOGY | Admitting: ANESTHESIOLOGY
Payer: COMMERCIAL

## 2021-01-29 VITALS
OXYGEN SATURATION: 95 % | HEART RATE: 54 BPM | DIASTOLIC BLOOD PRESSURE: 81 MMHG | TEMPERATURE: 97.3 F | RESPIRATION RATE: 20 BRPM | SYSTOLIC BLOOD PRESSURE: 127 MMHG

## 2021-01-29 DIAGNOSIS — M51.27 HERNIATED NUCLEUS PULPOSUS, L5-S1: ICD-10-CM

## 2021-01-29 DIAGNOSIS — M54.16 LUMBAR RADICULOPATHY: ICD-10-CM

## 2021-01-29 DIAGNOSIS — G89.29 CHRONIC LEFT-SIDED LOW BACK PAIN WITHOUT SCIATICA: ICD-10-CM

## 2021-01-29 DIAGNOSIS — M48.061 LUMBAR FORAMINAL STENOSIS: ICD-10-CM

## 2021-01-29 DIAGNOSIS — M54.50 CHRONIC LEFT-SIDED LOW BACK PAIN WITHOUT SCIATICA: ICD-10-CM

## 2021-01-29 PROCEDURE — 64484 NJX AA&/STRD TFRM EPI L/S EA: CPT | Performed by: ANESTHESIOLOGY

## 2021-01-29 PROCEDURE — 64483 NJX AA&/STRD TFRM EPI L/S 1: CPT | Performed by: ANESTHESIOLOGY

## 2021-01-29 RX ORDER — LIDOCAINE HYDROCHLORIDE 10 MG/ML
5 INJECTION, SOLUTION EPIDURAL; INFILTRATION; INTRACAUDAL; PERINEURAL ONCE
Status: COMPLETED | OUTPATIENT
Start: 2021-01-29 | End: 2021-01-29

## 2021-01-29 RX ORDER — METHYLPREDNISOLONE ACETATE 80 MG/ML
160 INJECTION, SUSPENSION INTRA-ARTICULAR; INTRALESIONAL; INTRAMUSCULAR; PARENTERAL; SOFT TISSUE ONCE
Status: COMPLETED | OUTPATIENT
Start: 2021-01-29 | End: 2021-01-29

## 2021-01-29 RX ADMIN — LIDOCAINE HYDROCHLORIDE 2 ML: 20 INJECTION, SOLUTION EPIDURAL; INFILTRATION; INTRACAUDAL at 09:20

## 2021-01-29 RX ADMIN — LIDOCAINE HYDROCHLORIDE 3 ML: 10 INJECTION, SOLUTION EPIDURAL; INFILTRATION; INTRACAUDAL; PERINEURAL at 09:20

## 2021-01-29 RX ADMIN — METHYLPREDNISOLONE ACETATE 160 MG: 80 INJECTION, SUSPENSION INTRA-ARTICULAR; INTRALESIONAL; INTRAMUSCULAR; SOFT TISSUE at 09:23

## 2021-01-29 RX ADMIN — IOHEXOL 1 ML: 300 INJECTION, SOLUTION INTRAVENOUS at 09:22

## 2021-01-29 NOTE — H&P
History of Present Illness: The patient is a 72 y o  male who presents with complaints of  Low back and leg  Patient Active Problem List   Diagnosis    Obstructive sleep apnea    Mood disturbance    Insufficient sleep syndrome    Obesity (BMI 30-39  9)    BPH without urinary obstruction    Chronic obstructive pulmonary disease (HCC)    Controlled type 2 diabetes mellitus without complication (HCC)    COPD (chronic obstructive pulmonary disease) with acute bronchitis (HCC)    Coronary artery disease    Essential hypertension    Hyperlipidemia    Low back pain    Obesity    Lateral epicondylitis of both elbows    Left hip pain    Left hip crepitus    Localized osteoarthrosis of left hip    Herniated nucleus pulposus, L5-S1    Lumbar foraminal stenosis    Lumbar radiculopathy       Past Medical History:   Diagnosis Date    Old myocardial infarction     Tear of medial meniscus of left knee, subsequent encounter     RESOLVE: [id number]       Past Surgical History:   Procedure Laterality Date    APPENDECTOMY      CORONARY ANGIOPLASTY      CORONARY ARTERY BYPASS GRAFT      KNEE ARTHROSCOPY      KNEE SURGERY Right     TONSILLECTOMY AND ADENOIDECTOMY      UMBILICAL HERNIA REPAIR           Current Outpatient Medications:     albuterol (2 5 mg/3 mL) 0 083 % nebulizer solution, Take 2 5 mg by nebulization 2 (two) times a day, Disp: , Rfl: 6    atenolol (TENORMIN) 25 mg tablet, Take 1 tablet by mouth daily, Disp: , Rfl: 1    Blood Glucose Monitoring Suppl (RUBINA CONTOUR MONITOR) MIGUEL ANGEL, by Does not apply route 2 (two) times a day, Disp: 1 Device, Rfl: 0    Blood Glucose Monitoring Suppl (BLOOD GLUCOSE MONITOR SYSTEM) w/Device KIT, by Does not apply route 2 (two) times a day, Disp: 100 each, Rfl: 3    clopidogrel (PLAVIX) 75 mg tablet, Take 1 tablet (75 mg total) by mouth daily, Disp: 90 tablet, Rfl: 1    ezetimibe-simvastatin (VYTORIN) 10-40 mg per tablet, Take 1 tablet by mouth daily, Disp: , Rfl:     glimepiride (AMARYL) 1 mg tablet, Take 1 tablet (1 mg total) by mouth 2 (two) times a day, Disp: 180 tablet, Rfl: 1    HYDROcodone-acetaminophen (NORCO) 5-325 mg per tablet, Take 1 tablet by mouth every 6 (six) hours as needed for painMax Daily Amount: 4 tablets, Disp: 30 tablet, Rfl: 0    KOMBIGLYZE XR 5-1000 MG TB24, Take 1 tablet by mouth daily, Disp: 90 tablet, Rfl: 1    methocarbamol (ROBAXIN) 750 mg tablet, Take 1 tablet (750 mg total) by mouth every 6 (six) hours as needed for muscle spasms, Disp: 30 tablet, Rfl: 0    QUEtiapine (SEROQUEL) 100 mg tablet, Take 1 tablet by mouth daily, Disp: , Rfl:     sildenafil (VIAGRA) 100 mg tablet, as needed , Disp: , Rfl: 1    STIOLTO RESPIMAT 2 5-2 5 MCG/ACT inhaler, Inhale 2 puffs daily, Disp: , Rfl: 11    Current Facility-Administered Medications:     iohexol (OMNIPAQUE) 300 mg/mL injection 50 mL, 50 mL, Epidural, Once, Navi Garcia DO    lidocaine (PF) (XYLOCAINE-MPF) 1 % injection 5 mL, 5 mL, Other, Once, Navi Garcia DO    lidocaine (PF) (XYLOCAINE-MPF) 2 % injection 5 mL, 5 mL, Epidural, Once, Navi Garcia DO    methylPREDNISolone acetate (DEPO-MEDROL) injection 160 mg, 160 mg, Epidural, Once, Navi Garcia DO    Allergies   Allergen Reactions    Penicillins Other (See Comments)     hives-emily cefazolin       Physical Exam:   General: Awake, Alert, Oriented x 3, Mood and affect appropriate  Respiratory: Respirations even and unlabored  Cardiovascular: Peripheral pulses intact; no edema  Musculoskeletal Exam: Decreased range of motion lumbar spine    ASA Score: II         Assessment:   1  Chronic left-sided low back pain without sciatica    2  Herniated nucleus pulposus, L5-S1    3  Lumbar foraminal stenosis    4   Lumbar radiculopathy        Plan: Left L5 & S1 TFESI

## 2021-01-29 NOTE — DISCHARGE INSTRUCTIONS
Epidural Steroid Injection   WHAT YOU NEED TO KNOW:   An epidural steroid injection (MEREDITH) is a procedure to inject steroid medicine into the epidural space  The epidural space is between your spinal cord and vertebrae  Steroids reduce inflammation and fluid buildup in your spine that may be causing pain  You may be given pain medicine along with the steroids  ACTIVITY  · Do not drive or operate machinery today  · No strenuous activity today - bending, lifting, etc   · You may resume normal activites starting tomorrow - start slowly and as tolerated  · You may shower today, but no tub baths or hot tubs  · You may have numbness for several hours from the local anesthetic  Please use caution and common sense, especially with weight-bearing activities  CARE OF THE INJECTION SITE  · If you have soreness or pain, apply ice to the area today (20 minutes on/20 minutes off)  · Starting tomorrow, you may use warm, moist heat or ice if needed  · You may have an increase or change in your discomfort for 36-48 hours after your treatment  · Apply ice and continue with any pain medication you have been prescribed  · Notify [facility] if you have any of the following: redness, drainage, swelling, headache, stiff neck or fever above 100°F     SPECIAL INSTRUCTIONS  · Our office will contact you in approximately 7 days for a progress report  MEDICATIONS  · Continue to take all routine medications  · Our office may have instructed you to hold some medications  If you have a problem specifically related to your procedure, please call our office at [phone]  Problems not related to your procedure should be directed to your primary care physician

## 2021-02-04 DIAGNOSIS — E11.8 TYPE 2 DIABETES MELLITUS WITH COMPLICATION, WITHOUT LONG-TERM CURRENT USE OF INSULIN (HCC): ICD-10-CM

## 2021-02-04 RX ORDER — GLIMEPIRIDE 1 MG/1
1 TABLET ORAL 2 TIMES DAILY
Qty: 180 TABLET | Refills: 1 | Status: SHIPPED | OUTPATIENT
Start: 2021-02-04 | End: 2021-07-27 | Stop reason: SDUPTHER

## 2021-02-05 ENCOUNTER — TELEPHONE (OUTPATIENT)
Dept: PAIN MEDICINE | Facility: CLINIC | Age: 66
End: 2021-02-05

## 2021-02-11 ENCOUNTER — TRANSCRIBE ORDERS (OUTPATIENT)
Dept: ADMINISTRATIVE | Facility: HOSPITAL | Age: 66
End: 2021-02-11

## 2021-02-11 DIAGNOSIS — R91.8 LUNG MASS: Primary | ICD-10-CM

## 2021-02-18 ENCOUNTER — TELEMEDICINE (OUTPATIENT)
Dept: PAIN MEDICINE | Facility: CLINIC | Age: 66
End: 2021-02-18
Payer: COMMERCIAL

## 2021-02-18 DIAGNOSIS — M54.16 LUMBAR RADICULOPATHY: Primary | ICD-10-CM

## 2021-02-18 PROCEDURE — 99213 OFFICE O/P EST LOW 20 MIN: CPT | Performed by: NURSE PRACTITIONER

## 2021-02-18 RX ORDER — GABAPENTIN 300 MG/1
300 CAPSULE ORAL 3 TIMES DAILY
Qty: 90 CAPSULE | Refills: 0 | Status: SHIPPED | OUTPATIENT
Start: 2021-02-18 | End: 2021-03-31 | Stop reason: SDUPTHER

## 2021-02-18 NOTE — PROGRESS NOTES
Virtual Brief Visit    Assessment/Plan:     initiate gabapentin 300 mg working up to 1 tablet 3 times daily  Patient was given instruction how to safely increase the dose  He was educated on the most common side effects which are drowsiness, dizziness, blurry vision swelling of the hands and feet  He is aware that if he would like to stop the medication he must do so slowly stopping suddenly puts him at risk to have a seizure  The medication was sent to his pharmacy  We did discuss the option of repeating his left L5 and S1 transforaminal epidural steroid injection since he did get some relief but it was short term  He tells me he would like to see how the medication works 1st before he scheduled another injection  Patient to follow-up in 4 weeks to re-evaluate new medication    Problem List Items Addressed This Visit        Nervous and Auditory    Lumbar radiculopathy - Primary    Relevant Medications    gabapentin (NEURONTIN) 300 mg capsule                Reason for visit is  Procedure follow-up  Chief Complaint   Patient presents with    Virtual Brief Visit        Encounter provider Kendall Ohara, [address] [place] [address]    Provider located at [address] [place] [address]  [phone]    Recent Visits  No visits were found meeting these conditions  Showing recent visits within past 7 days and meeting all other requirements     Today's Visits  Date Type Provider Dept   02/18/21 [address] [place] [address] Drive, [facility]   Showing today's visits and meeting all other requirements     Future Appointments  No visits were found meeting these conditions  Showing future appointments within next 150 days and meeting all other requirements        After connecting through telephone, the patient was identified by name and date of birth   Esme Majano was informed that this is a telemedicine visit and that the visit is being conducted through telephone  My office door was closed  No one else was in the room  He acknowledged consent and understanding of privacy and security of the platform  The patient has agreed to participate and understands he can discontinue the visit at any time  Patient is aware this is a billable service  Subjective    Tor Ramirez is a 72 y o  male  Presents for follow-up related to his chronic left low back and leg pain  Today the patient rates his pain 8/10  He is status post a left L5 and S1 transforaminal epidural steroid injection on January 29, 2021 with Dr Garcia  patient tells me that he received about 15% relief that lasted 1 week    HPI     Past Medical History:   Diagnosis Date    Old myocardial infarction     Tear of medial meniscus of left knee, subsequent encounter     RESOLVE: [id number]       Past Surgical History:   Procedure Laterality Date    APPENDECTOMY      CORONARY ANGIOPLASTY      CORONARY ARTERY BYPASS GRAFT      KNEE ARTHROSCOPY      KNEE SURGERY Right     TONSILLECTOMY AND ADENOIDECTOMY      UMBILICAL HERNIA REPAIR         Current Outpatient Medications   Medication Sig Dispense Refill    albuterol (2 5 mg/3 mL) 0 083 % nebulizer solution Take 2 5 mg by nebulization 2 (two) times a day  6    atenolol (TENORMIN) 25 mg tablet Take 1 tablet by mouth daily  1    Blood Glucose Monitoring Suppl (Kiosked CONTOUR MONITOR) MIGUEL ANGEL by Does not apply route 2 (two) times a day 1 Device 0    Blood Glucose Monitoring Suppl (BLOOD GLUCOSE MONITOR SYSTEM) w/Device KIT by Does not apply route 2 (two) times a day 100 each 3    clopidogrel (PLAVIX) 75 mg tablet Take 1 tablet (75 mg total) by mouth daily 90 tablet 1    ezetimibe-simvastatin (VYTORIN) 10-40 mg per tablet Take 1 tablet by mouth daily      gabapentin (NEURONTIN) 300 mg capsule Take 1 capsule (300 mg total) by mouth 3 (three) times a day 90 capsule 0    glimepiride (AMARYL) 1 mg tablet Take 1 tablet (1 mg total) by mouth 2 (two) times a day 180 tablet 1    HYDROcodone-acetaminophen (NORCO) 5-325 mg per tablet Take 1 tablet by mouth every 6 (six) hours as needed for painMax Daily Amount: 4 tablets 30 tablet 0    KOMBIGLYZE XR 5-1000 MG TB24 Take 1 tablet by mouth daily 90 tablet 1    methocarbamol (ROBAXIN) 750 mg tablet Take 1 tablet (750 mg total) by mouth every 6 (six) hours as needed for muscle spasms 30 tablet 0    QUEtiapine (SEROQUEL) 100 mg tablet Take 1 tablet by mouth daily      sildenafil (VIAGRA) 100 mg tablet as needed   1    STIOLTO RESPIMAT 2 5-2 5 MCG/ACT inhaler Inhale 2 puffs daily  11     No current facility-administered medications for this visit  Allergies   Allergen Reactions    Penicillins Other (See Comments)     hives-emily cefazolin       Review of Systems    There were no vitals filed for this visit  I spent 6 minutes directly with the patient during this visit   It was my intent to perform this visit via video technology but the patient was not able to do a video connection so the visit was completed via audio telephone only  VIRTUAL VISIT DISCLAIMER    Arnav Mejía acknowledges that he has consented to an online visit or consultation  He understands that the online visit is based solely on information provided by him, and that, in the absence of a face-to-face physical evaluation by the physician, the diagnosis he receives is both limited and provisional in terms of accuracy and completeness  This is not intended to replace a full medical face-to-face evaluation by the physician  Arnav Mejía understands and accepts these terms

## 2021-02-19 ENCOUNTER — HOSPITAL ENCOUNTER (OUTPATIENT)
Dept: CT IMAGING | Facility: HOSPITAL | Age: 66
Discharge: HOME | End: 2021-02-19
Payer: COMMERCIAL

## 2021-02-19 DIAGNOSIS — R91.8 LUNG MASS: ICD-10-CM

## 2021-02-19 PROCEDURE — G1004 CDSM NDSC: HCPCS

## 2021-02-19 PROCEDURE — 71250 CT THORAX DX C-: CPT

## 2021-03-04 DIAGNOSIS — Z23 ENCOUNTER FOR IMMUNIZATION: ICD-10-CM

## 2021-03-05 DIAGNOSIS — R52 PAIN: ICD-10-CM

## 2021-03-05 RX ORDER — METHOCARBAMOL 750 MG/1
750 TABLET, FILM COATED ORAL EVERY 6 HOURS PRN
Qty: 30 TABLET | Refills: 3 | Status: SHIPPED | OUTPATIENT
Start: 2021-03-05 | End: 2021-03-31 | Stop reason: SDUPTHER

## 2021-03-31 ENCOUNTER — OFFICE VISIT (OUTPATIENT)
Dept: PAIN MEDICINE | Facility: CLINIC | Age: 66
End: 2021-03-31
Payer: COMMERCIAL

## 2021-03-31 VITALS
WEIGHT: 246 LBS | SYSTOLIC BLOOD PRESSURE: 122 MMHG | HEIGHT: 70 IN | BODY MASS INDEX: 35.22 KG/M2 | HEART RATE: 71 BPM | DIASTOLIC BLOOD PRESSURE: 74 MMHG | TEMPERATURE: 97.9 F

## 2021-03-31 DIAGNOSIS — G89.4 CHRONIC PAIN SYNDROME: Primary | ICD-10-CM

## 2021-03-31 DIAGNOSIS — M54.50 LOW BACK PAIN WITHOUT SCIATICA, UNSPECIFIED BACK PAIN LATERALITY, UNSPECIFIED CHRONICITY: ICD-10-CM

## 2021-03-31 DIAGNOSIS — M54.16 LUMBAR RADICULOPATHY: ICD-10-CM

## 2021-03-31 DIAGNOSIS — R52 PAIN: ICD-10-CM

## 2021-03-31 DIAGNOSIS — M51.27 HERNIATED NUCLEUS PULPOSUS, L5-S1: ICD-10-CM

## 2021-03-31 DIAGNOSIS — M54.40 LOW BACK PAIN WITH SCIATICA, SCIATICA LATERALITY UNSPECIFIED, UNSPECIFIED BACK PAIN LATERALITY, UNSPECIFIED CHRONICITY: ICD-10-CM

## 2021-03-31 DIAGNOSIS — M25.552 LEFT HIP PAIN: ICD-10-CM

## 2021-03-31 DIAGNOSIS — M48.061 LUMBAR FORAMINAL STENOSIS: ICD-10-CM

## 2021-03-31 PROCEDURE — 99214 OFFICE O/P EST MOD 30 MIN: CPT | Performed by: NURSE PRACTITIONER

## 2021-03-31 RX ORDER — HYDROCODONE BITARTRATE AND ACETAMINOPHEN 5; 325 MG/1; MG/1
1 TABLET ORAL EVERY 6 HOURS PRN
Qty: 30 TABLET | Refills: 0 | Status: SHIPPED | OUTPATIENT
Start: 2021-03-31 | End: 2021-06-02 | Stop reason: SDUPTHER

## 2021-03-31 RX ORDER — GABAPENTIN 400 MG/1
CAPSULE ORAL
Qty: 60 CAPSULE | Refills: 3 | Status: SHIPPED | OUTPATIENT
Start: 2021-03-31 | End: 2021-07-21 | Stop reason: SDUPTHER

## 2021-03-31 RX ORDER — METHOCARBAMOL 750 MG/1
750 TABLET, FILM COATED ORAL EVERY 6 HOURS PRN
Qty: 30 TABLET | Refills: 0 | Status: SHIPPED | OUTPATIENT
Start: 2021-03-31 | End: 2021-04-29 | Stop reason: SDUPTHER

## 2021-03-31 NOTE — PROGRESS NOTES
Assessment:  1  Chronic pain syndrome    2  Low back pain with sciatica, sciatica laterality unspecified, unspecified back pain laterality, unspecified chronicity    3  Left hip pain    4  Lumbar radiculopathy    5  Herniated nucleus pulposus, L5-S1    6  Lumbar foraminal stenosis        Plan:   While the patient was in the office today, I did have a thorough conversation with the patient regarding their chronic pain syndrome, symptoms, medication regimen, and treatment plan  I discussed with the patient that since there are times he is inconsistent with taking the 900 mg every day because he forgets the middle dose, could change his gabapentin to 400 mg b i d  as this would give him 800 mg more consistently and would be easier for him to remember and hopefully in time even the sleepiness he is experiencing from the gabapentin will slowly improve  I advised the patient that if they experience any side effects or issues with the changes in their medication regiment, they should give our office a call to discuss  I also advised the patient not to drive or operate machinery until they see how the changes in the medication regimen affects them  The patient was agreeable and verbalized an understanding  I did encourage the patient to slowly and steadily increase his activity, as tolerated, and reminded him to allow pain to be his guide  The patient was agreeable and verbalized an understanding  The patient will follow-up in 4 months for medication prescription refill and reevaluation  The patient was advised to contact the office should their symptoms worsen in the interim  The patient was agreeable and verbalized an understanding  History of Present Illness: The patient is a 72 y o  male last seen on 2/18/2021 who presents for a follow up office visit in regards to Chronic pain syndrome secondary to  Herniated lumbar discs with stenosis radiculopathy and chronic left hip pain    The patient currently reports that since his last virtual visit he did start the gabapentin and titrate up to the t i d  dosing as prescribed and feels there is moderate to significant overall relief or improvement  He does report that many times he forgets to take the middle dose and that the only side effect he can not tell as that he is a little more tired than normal but he feels that the tiredness is worth the significant relief as he is doing so much better and feels is the best he has felt since September of 2020  He presents today to discuss his medication regimen and treatment plan  Current pain medications includes: Gabapentin 300 mg t i d  The patient reports that this regimen is providing 70% pain relief  The patient is reporting sleepiness from this pain medication regimen  I have personally reviewed and/or updated the patient's past medical history, past surgical history, family history, social history, current medications, allergies, and vital signs today  Review of Systems:    Review of Systems   Respiratory: Negative for shortness of breath  Cardiovascular: Negative for chest pain  Gastrointestinal: Negative for constipation, diarrhea, nausea and vomiting  Musculoskeletal: Positive for gait problem  Negative for arthralgias, joint swelling (joint stiffness) and myalgias  Skin: Negative for rash  Neurological: Negative for dizziness, seizures and weakness  All other systems reviewed and are negative          Past Medical History:   Diagnosis Date    Old myocardial infarction     Tear of medial meniscus of left knee, subsequent encounter     RESOLVE: [id number]       Past Surgical History:   Procedure Laterality Date    APPENDECTOMY      CORONARY ANGIOPLASTY      CORONARY ARTERY BYPASS GRAFT      KNEE ARTHROSCOPY      KNEE SURGERY Right     TONSILLECTOMY AND ADENOIDECTOMY      UMBILICAL HERNIA REPAIR         Family History   Adopted: Yes       Social History     Occupational History    Occupation: WORKING FULL TIME    Tobacco Use    Smoking status: Former Smoker     Quit date: 10/2/2013     Years since quittin 4    Smokeless tobacco: Former User    Tobacco comment: FORMER SMOKER AS PER ALL SCRIPTS    Substance and Sexual Activity    Alcohol use: No    Drug use: Never     Comment: Occasional    Sexual activity: Yes     Partners: Female     Birth control/protection: None         Current Outpatient Medications:     albuterol (2 5 mg/3 mL) 0 083 % nebulizer solution, Take 2 5 mg by nebulization 2 (two) times a day, Disp: , Rfl: 6    atenolol (TENORMIN) 25 mg tablet, Take 1 tablet by mouth daily, Disp: , Rfl: 1    clopidogrel (PLAVIX) 75 mg tablet, Take 1 tablet (75 mg total) by mouth daily, Disp: 90 tablet, Rfl: 1    ezetimibe-simvastatin (VYTORIN) 10-40 mg per tablet, Take 1 tablet by mouth daily, Disp: , Rfl:     gabapentin (NEURONTIN) 400 mg capsule, Take 1 PO BID , Disp: 60 capsule, Rfl: 3    glimepiride (AMARYL) 1 mg tablet, Take 1 tablet (1 mg total) by mouth 2 (two) times a day, Disp: 180 tablet, Rfl: 1    HYDROcodone-acetaminophen (NORCO) 5-325 mg per tablet, Take 1 tablet by mouth every 6 (six) hours as needed for painMax Daily Amount: 4 tablets, Disp: 30 tablet, Rfl: 0    KOMBIGLYZE XR 5-1000 MG TB24, Take 1 tablet by mouth daily, Disp: 90 tablet, Rfl: 1    methocarbamol (ROBAXIN) 750 mg tablet, Take 1 tablet (750 mg total) by mouth every 6 (six) hours as needed for muscle spasms, Disp: 30 tablet, Rfl: 3    QUEtiapine (SEROQUEL) 100 mg tablet, Take 1 tablet by mouth daily, Disp: , Rfl:     sildenafil (VIAGRA) 100 mg tablet, as needed , Disp: , Rfl: 1    STIOLTO RESPIMAT 2 5-2 5 MCG/ACT inhaler, Inhale 2 puffs daily, Disp: , Rfl: 11    Blood Glucose Monitoring Suppl (Meine Spielzeugkiste CONTOUR MONITOR) MIGUEL ANGEL, by Does not apply route 2 (two) times a day, Disp: 1 Device, Rfl: 0    Blood Glucose Monitoring Suppl (BLOOD GLUCOSE MONITOR SYSTEM) w/Device KIT, by Does not apply route 2 (two) times a day, Disp: 100 each, Rfl: 3    Allergies   Allergen Reactions    Penicillins Other (See Comments)     hives-emily cefazolin       Physical Exam:    /74 (BP Location: Left arm, Patient Position: Sitting, Cuff Size: Standard)   Pulse 71   Temp 97 9 °F (36 6 °C)   Ht 5' 10" (1 778 m)   Wt 112 kg (246 lb)   BMI 35 30 kg/m²     Constitutional:normal, well developed, well nourished, alert, in no distress and non-toxic and no overt pain behavior  and overweight  Eyes:anicteric  HEENT:grossly intact  Neck:supple, symmetric, trachea midline and no masses   Pulmonary:even and unlabored  Cardiovascular:No edema or pitting edema present  Skin:Normal without rashes or lesions and well hydrated  Psychiatric:Mood and affect appropriate  Neurologic:Cranial Nerves II-XII grossly intact  Musculoskeletal:The patient's gait is slightly antalgic, but steady without the use of any assistive devices  Imaging  No orders to display         No orders of the defined types were placed in this encounter

## 2021-04-29 DIAGNOSIS — R52 PAIN: ICD-10-CM

## 2021-04-29 RX ORDER — METHOCARBAMOL 750 MG/1
750 TABLET, FILM COATED ORAL EVERY 6 HOURS PRN
Qty: 30 TABLET | Refills: 1 | Status: SHIPPED | OUTPATIENT
Start: 2021-04-29 | End: 2021-06-02 | Stop reason: SDUPTHER

## 2021-06-01 ENCOUNTER — TELEPHONE (OUTPATIENT)
Dept: SLEEP CENTER | Facility: CLINIC | Age: 66
End: 2021-06-01

## 2021-06-01 DIAGNOSIS — G47.33 OBSTRUCTIVE SLEEP APNEA: Primary | ICD-10-CM

## 2021-06-01 NOTE — TELEPHONE ENCOUNTER
Patient called he spoke with [facility] [place], his PAP machine is broken and can't be repaired  Yearly appointment is scheduled for 10/12/2021 with Dr Vinny Rahman     Patient asking for script for replacement machine     RX must have patient ID # [id number] also state that machine is broken beyond repair

## 2021-06-02 DIAGNOSIS — R52 PAIN: ICD-10-CM

## 2021-06-02 DIAGNOSIS — M54.50 LOW BACK PAIN WITHOUT SCIATICA, UNSPECIFIED BACK PAIN LATERALITY, UNSPECIFIED CHRONICITY: ICD-10-CM

## 2021-06-02 RX ORDER — METHOCARBAMOL 750 MG/1
TABLET, FILM COATED ORAL
Qty: 30 TABLET | Refills: 1 | OUTPATIENT
Start: 2021-06-02

## 2021-06-02 RX ORDER — HYDROCODONE BITARTRATE AND ACETAMINOPHEN 5; 325 MG/1; MG/1
1 TABLET ORAL EVERY 6 HOURS PRN
Qty: 30 TABLET | Refills: 0 | Status: SHIPPED | OUTPATIENT
Start: 2021-06-02 | End: 2021-07-20 | Stop reason: SDUPTHER

## 2021-06-02 RX ORDER — METHOCARBAMOL 750 MG/1
750 TABLET, FILM COATED ORAL EVERY 6 HOURS PRN
Qty: 30 TABLET | Refills: 0 | Status: SHIPPED | OUTPATIENT
Start: 2021-06-02 | End: 2021-07-20 | Stop reason: SDUPTHER

## 2021-06-03 NOTE — TELEPHONE ENCOUNTER
Left message for patient, advised new DME order written, will send to [facility]  Also, advised patient will need to reschedule compliance follow up, will need to be 31-90 days after getting new machine  Advised to call office with questions or concerns      New script faxed to [facility]

## 2021-06-03 NOTE — TELEPHONE ENCOUNTER
Patient left message asking for Dr Dereck Etienne to write Rx for replacement BiPAP  Called patient and advised message has been sent to Dr Dereck Etienne regarding new Rx and one of the nursing staff will give him a call once order is written  Verbalized understanding

## 2021-06-04 NOTE — TELEPHONE ENCOUNTER
Advised patient that RX for machine was faxed to [facility] yesterday  Patient scheduled for compliance follow up with Dr Doreen Fernandez     Transferred the call to Edwige Rainey from [facility] so patient could discuss new machine order and set up with her

## 2021-06-07 NOTE — TELEPHONE ENCOUNTER
Patient called to check a status on his order  Reached out to my coworkers who process the orders  They never received the clinical information  Sent it over and asked if they could expedite it

## 2021-07-08 RX ORDER — CLINDAMYCIN HYDROCHLORIDE 150 MG/1
CAPSULE ORAL
COMMUNITY
Start: 2021-04-05 | End: 2021-07-21

## 2021-07-09 ENCOUNTER — OFFICE VISIT (OUTPATIENT)
Dept: FAMILY MEDICINE CLINIC | Facility: CLINIC | Age: 66
End: 2021-07-09
Payer: COMMERCIAL

## 2021-07-09 ENCOUNTER — APPOINTMENT (OUTPATIENT)
Dept: RADIOLOGY | Facility: CLINIC | Age: 66
End: 2021-07-09
Payer: COMMERCIAL

## 2021-07-09 VITALS
WEIGHT: 231 LBS | HEIGHT: 70 IN | OXYGEN SATURATION: 94 % | DIASTOLIC BLOOD PRESSURE: 82 MMHG | SYSTOLIC BLOOD PRESSURE: 128 MMHG | HEART RATE: 61 BPM | TEMPERATURE: 98.1 F | BODY MASS INDEX: 33.07 KG/M2 | RESPIRATION RATE: 24 BRPM

## 2021-07-09 DIAGNOSIS — J18.9 PNEUMONIA OF RIGHT LOWER LOBE DUE TO INFECTIOUS ORGANISM: ICD-10-CM

## 2021-07-09 DIAGNOSIS — R05.9 COUGH: Primary | ICD-10-CM

## 2021-07-09 DIAGNOSIS — R06.02 SOB (SHORTNESS OF BREATH): ICD-10-CM

## 2021-07-09 DIAGNOSIS — R05.9 COUGH: ICD-10-CM

## 2021-07-09 PROCEDURE — 71046 X-RAY EXAM CHEST 2 VIEWS: CPT

## 2021-07-09 PROCEDURE — 99214 OFFICE O/P EST MOD 30 MIN: CPT | Performed by: NURSE PRACTITIONER

## 2021-07-09 RX ORDER — PROMETHAZINE HYDROCHLORIDE AND CODEINE PHOSPHATE 6.25; 1 MG/5ML; MG/5ML
5 SYRUP ORAL EVERY 4 HOURS PRN
Qty: 180 ML | Refills: 0 | Status: SHIPPED | OUTPATIENT
Start: 2021-07-09 | End: 2021-07-13 | Stop reason: SDUPTHER

## 2021-07-09 RX ORDER — AZITHROMYCIN 250 MG/1
250 TABLET, FILM COATED ORAL DAILY
Qty: 6 TABLET | Refills: 0 | Status: SHIPPED | OUTPATIENT
Start: 2021-07-09 | End: 2021-07-13 | Stop reason: SDUPTHER

## 2021-07-09 NOTE — PATIENT INSTRUCTIONS
Acute Bronchitis   WHAT YOU NEED TO KNOW:   Acute bronchitis is swelling and irritation in your lungs  It is usually caused by a virus and most often happens in the winter  Bronchitis may also be caused by bacteria or by a chemical irritant, such as smoke  DISCHARGE INSTRUCTIONS:   Return to the emergency department if:   · You cough up blood  · Your lips or fingernails turn blue  · You feel like you are not getting enough air when you breathe  Call your doctor if:   · Your symptoms do not go away or get worse, even after treatment  · Your cough does not get better within 4 weeks  · You have questions or concerns about your condition or care  Medicines: You may  need any of the following:  · Cough suppressants  decrease your urge to cough  · Decongestants  help loosen mucus in your lungs and make it easier to cough up  This can help you breathe easier  · Inhalers  may be given  Your healthcare provider may give you one or more inhalers to help you breathe easier and cough less  An inhaler gives your medicine to open your airways  Ask your healthcare provider to show you how to use your inhaler correctly  · Acetaminophen  decreases pain and fever  It is available without a doctor's order  Ask how much to take and how often to take it  Follow directions  Read the labels of all other medicines you are using to see if they also contain acetaminophen, or ask your doctor or pharmacist  Acetaminophen can cause liver damage if not taken correctly  Do not use more than 4 grams (4,000 milligrams) total of acetaminophen in one day  · NSAIDs  help decrease swelling and pain or fever  This medicine is available with or without a doctor's order  NSAIDs can cause stomach bleeding or kidney problems in certain people  If you take blood thinner medicine, always ask your healthcare provider if NSAIDs are safe for you  Always read the medicine label and follow directions      · Take your medicine as directed  Contact your healthcare provider if you think your medicine is not helping or if you have side effects  Tell him of her if you are allergic to any medicine  Keep a list of the medicines, vitamins, and herbs you take  Include the amounts, and when and why you take them  Bring the list or the pill bottles to follow-up visits  Carry your medicine list with you in case of an emergency  Self-care:   · Drink liquids as directed  You may need to drink more liquids than usual to stay hydrated  Ask how much liquid to drink each day and which liquids are best for you  · Use a cool mist humidifier  to increase air moisture in your home  This may make it easier for you to breathe and help decrease your cough  · Get more rest   Rest helps your body to heal  Slowly start to do more each day  Rest when you feel it is needed  · Avoid irritants in the air  Avoid chemicals, fumes, and dust  Wear a face mask if you must work around dust or fumes  Stay inside on days when air pollution levels are high  If you have allergies, stay inside when pollen counts are high  Do not use aerosol products, such as spray-on deodorant, bug spray, and hair spray  · Do not smoke or be around others who are smoking  Nicotine and other chemicals in cigarettes and cigars can cause lung damage  Ask your healthcare provider for information if you currently smoke and need help to quit  E-cigarettes or smokeless tobacco still contain nicotine  Talk to your healthcare provider before you use these products  Prevent acute bronchitis:       · Get the vaccinations you need  Ask your healthcare provider if you should get the flu or pneumonia vaccines  · Prevent the spread of germs  You can decrease your risk for acute bronchitis and other illnesses by doing the following:     ? Wash your hands often with soap and water  Carry germ-killing hand lotion or gel with you   You can use the lotion or gel to clean your hands when soap and water are not available  ? Do not touch your eyes, nose, or mouth unless you have washed your hands first     ? Always cover your mouth when you cough to prevent the spread of germs  It is best to cough into a tissue or your shirt sleeve instead of into your hand  Ask those around you to cover their mouths when they cough  ? Try to avoid people who have a cold or the flu  If you are sick, stay away from others as much as possible  Follow up with your doctor as directed:  Write down questions you have so you will remember to ask them during your follow-up visits  © Copyright [address] Information is for End User's use only and may not be sold, redistributed or otherwise used for commercial purposes  All illustrations and images included in CareNotes® are the copyrighted property of [employer]  or [employer]  The above information is an  only  It is not intended as medical advice for individual conditions or treatments  Talk to your doctor, nurse or pharmacist before following any medical regimen to see if it is safe and effective for you

## 2021-07-09 NOTE — LETTER
July 9, 2021     Patient: Sabine Bundy   YOB: 1955   Date of Visit: 7/9/2021       To Whom it May Concern:    Peter Darlanny is under my professional care  He was seen in my office on 7/9/2021  He may return to work on 7/12/2021  If you have any questions or concerns, please don't hesitate to call           Sincerely,          HEMANTH Montoya        CC: No Recipients

## 2021-07-09 NOTE — PROGRESS NOTES
Assessment/Plan   Problem List Items Addressed This Visit     None      Visit Diagnoses     Cough    -  Primary    Relevant Medications    promethazine-codeine (PHENERGAN WITH CODEINE) 6 25-10 mg/5 mL syrup    Other Relevant Orders    XR chest pa & lateral    Pneumonia of right lower lobe due to infectious organism        Relevant Medications    azithromycin (ZITHROMAX) 250 mg tablet    promethazine-codeine (PHENERGAN WITH CODEINE) 6 25-10 mg/5 mL syrup    SOB (shortness of breath)        Relevant Orders    XR chest pa & lateral                Chief Complaint   Patient presents with    Chest Congestion     some shortness of breath for a couple days    Cough     productive cough       Subjective   Patient ID: Primitivo Apolinar is a 72 y o  male  Vitals:    07/09/21 1438   BP: 128/82   Pulse: 61   Resp: (!) 24   Temp: 98 1 °F (36 7 °C)   SpO2: 94%     Cough  This is a new problem  The current episode started in the past 7 days  The problem has been gradually worsening  The cough is productive of sputum  Associated symptoms include nasal congestion, shortness of breath and wheezing  Pertinent negatives include no ear pain, fever or weight loss  Chest pain: chest tightness  The symptoms are aggravated by lying down  Risk factors: unknown  He has tried rest (using albuteral nebulizer twice daily) for the symptoms  The treatment provided mild relief  His past medical history is significant for bronchitis and pneumonia  The following portions of the patient's history were reviewed and updated as appropriate: allergies, past family history, past medical history, past social history, past surgical history and problem list     Review of Systems   Constitutional: Negative  Negative for fever and weight loss  HENT: Positive for congestion  Negative for ear pain  Eyes: Negative  Respiratory: Positive for cough, shortness of breath and wheezing  Cardiovascular: Negative  Chest pain: chest tightness  Gastrointestinal: Negative  Endocrine: Negative  Genitourinary: Negative  Musculoskeletal: Negative  Skin: Negative  Allergic/Immunologic: Negative  Neurological: Negative  Hematological: Negative  Psychiatric/Behavioral: Negative  Objective     Physical Exam  Vitals and nursing note reviewed  Constitutional:       Appearance: Normal appearance  He is ill-appearing  HENT:      Head: Normocephalic and atraumatic  Right Ear: Tympanic membrane, ear canal and external ear normal  There is no impacted cerumen  Left Ear: Tympanic membrane, ear canal and external ear normal  There is no impacted cerumen  Nose: Congestion present  Mouth/Throat:      Mouth: Mucous membranes are moist       Pharynx: Oropharyngeal exudate present  No posterior oropharyngeal erythema  Eyes:      General:         Right eye: No discharge  Left eye: No discharge  Extraocular Movements: Extraocular movements intact  Conjunctiva/sclera: Conjunctivae normal    Neck:      Vascular: No carotid bruit  Cardiovascular:      Rate and Rhythm: Normal rate and regular rhythm  Pulses: Normal pulses  Heart sounds: Normal heart sounds  No murmur heard  Pulmonary:      Effort: Pulmonary effort is normal       Breath sounds: Wheezing and rhonchi present  Abdominal:      General: Bowel sounds are normal       Palpations: Abdomen is soft  Tenderness: There is no right CVA tenderness or left CVA tenderness  Musculoskeletal:         General: No swelling or tenderness  Normal range of motion  Cervical back: Normal range of motion and neck supple  No rigidity or tenderness  Right lower leg: No edema  Left lower leg: No edema  Lymphadenopathy:      Cervical: No cervical adenopathy  Skin:     General: Skin is warm and dry  Neurological:      Mental Status: He is alert and oriented to person, place, and time     Psychiatric:         Mood and Affect: Mood normal          Behavior: Behavior normal          Thought Content:  Thought content normal          Judgment: Judgment normal

## 2021-07-13 DIAGNOSIS — J18.9 PNEUMONIA OF RIGHT LOWER LOBE DUE TO INFECTIOUS ORGANISM: ICD-10-CM

## 2021-07-13 DIAGNOSIS — R05.9 COUGH: ICD-10-CM

## 2021-07-13 RX ORDER — AZITHROMYCIN 250 MG/1
250 TABLET, FILM COATED ORAL DAILY
Qty: 6 TABLET | Refills: 0 | Status: SHIPPED | OUTPATIENT
Start: 2021-07-13 | End: 2021-07-18

## 2021-07-13 RX ORDER — ALBUTEROL SULFATE 90 UG/1
2 AEROSOL, METERED RESPIRATORY (INHALATION) EVERY 6 HOURS PRN
Qty: 6.7 G | Refills: 1 | Status: SHIPPED | OUTPATIENT
Start: 2021-07-13 | End: 2021-08-12

## 2021-07-13 RX ORDER — PROMETHAZINE HYDROCHLORIDE AND CODEINE PHOSPHATE 6.25; 1 MG/5ML; MG/5ML
5 SYRUP ORAL EVERY 4 HOURS PRN
Qty: 118 ML | Refills: 0 | Status: SHIPPED | OUTPATIENT
Start: 2021-07-13 | End: 2021-07-21

## 2021-07-13 NOTE — TELEPHONE ENCOUNTER
Cough hasn't gotten better  Would it be possible to refill  azithromycin (ZITHROMAX) 250 mg tablet [[id number]   Also would like a different inhaler please     [facility] [address], [address] [facility]    [address] , [place]  [place] [address]   Phone:  [phone]  Fax:  [phone]     Please call Nuvia Aleman with status    [phone]    Thank you

## 2021-07-13 NOTE — TELEPHONE ENCOUNTER
[facility] [place]       *Patient is also requesting a new inhaler (See MyChart message)

## 2021-07-20 DIAGNOSIS — R52 PAIN: ICD-10-CM

## 2021-07-20 DIAGNOSIS — M54.50 LOW BACK PAIN WITHOUT SCIATICA, UNSPECIFIED BACK PAIN LATERALITY, UNSPECIFIED CHRONICITY: ICD-10-CM

## 2021-07-20 RX ORDER — METHOCARBAMOL 750 MG/1
750 TABLET, FILM COATED ORAL EVERY 6 HOURS PRN
Qty: 30 TABLET | Refills: 0 | Status: SHIPPED | OUTPATIENT
Start: 2021-07-20 | End: 2021-08-10 | Stop reason: SDUPTHER

## 2021-07-20 RX ORDER — HYDROCODONE BITARTRATE AND ACETAMINOPHEN 5; 325 MG/1; MG/1
1 TABLET ORAL EVERY 6 HOURS PRN
Qty: 30 TABLET | Refills: 0 | Status: SHIPPED | OUTPATIENT
Start: 2021-07-20 | End: 2021-09-24 | Stop reason: SDUPTHER

## 2021-07-21 ENCOUNTER — OFFICE VISIT (OUTPATIENT)
Dept: PAIN MEDICINE | Facility: CLINIC | Age: 66
End: 2021-07-21
Payer: COMMERCIAL

## 2021-07-21 VITALS
WEIGHT: 235 LBS | DIASTOLIC BLOOD PRESSURE: 68 MMHG | SYSTOLIC BLOOD PRESSURE: 118 MMHG | HEIGHT: 70 IN | HEART RATE: 74 BPM | BODY MASS INDEX: 33.64 KG/M2 | TEMPERATURE: 98.2 F

## 2021-07-21 DIAGNOSIS — M51.27 HERNIATED NUCLEUS PULPOSUS, L5-S1: ICD-10-CM

## 2021-07-21 DIAGNOSIS — M54.40 LOW BACK PAIN WITH SCIATICA, SCIATICA LATERALITY UNSPECIFIED, UNSPECIFIED BACK PAIN LATERALITY, UNSPECIFIED CHRONICITY: ICD-10-CM

## 2021-07-21 DIAGNOSIS — M25.552 LEFT HIP PAIN: ICD-10-CM

## 2021-07-21 DIAGNOSIS — M54.16 LUMBAR RADICULOPATHY: ICD-10-CM

## 2021-07-21 DIAGNOSIS — M48.061 LUMBAR FORAMINAL STENOSIS: ICD-10-CM

## 2021-07-21 DIAGNOSIS — G89.4 CHRONIC PAIN SYNDROME: Primary | ICD-10-CM

## 2021-07-21 PROCEDURE — 99214 OFFICE O/P EST MOD 30 MIN: CPT | Performed by: NURSE PRACTITIONER

## 2021-07-21 RX ORDER — GABAPENTIN 400 MG/1
CAPSULE ORAL
Qty: 60 CAPSULE | Refills: 5 | Status: SHIPPED | OUTPATIENT
Start: 2021-07-21 | End: 2022-01-05 | Stop reason: SDUPTHER

## 2021-07-21 NOTE — PROGRESS NOTES
Assessment:  1  Chronic pain syndrome    2  Low back pain with sciatica, sciatica laterality unspecified, unspecified back pain laterality, unspecified chronicity    3  Left hip pain    4  Lumbar radiculopathy    5  Herniated nucleus pulposus, L5-S1    6  Lumbar foraminal stenosis        Plan:   While the patient was in the office today, I did have a thorough conversation with the patient regarding their chronic pain syndrome, symptoms, medication regimen, and treatment plan  I did discuss with the patient that at this point since he is still having some issues with ladders, although I could not guarantee that with any medication changes we could make that better, since he is on a subtherapeutic dose of gabapentin, we could try further titrating gabapentin to a more therapeutic dose of 1200 mg a day and see how he does  However, overall the patient did not feel that not being able to climb ladders is not something as to do frequently and is not that intrusive into his life and for now he would like to just continue the gabapentin at the 400 mg b i d  dosing  I advised the patient that if between now and his next office visit his pain symptoms should start to worsen or he would like to increase the gabapentin before his next office visit, he should call our office and we can change his prescription  The patient was agreeable and verbalized an understanding  I discussed with the patient at this point time it appears that his pain symptoms are chronic and are going to be something that we have to continue to manage, possibly lifelong and that it is a my opinion that his current medication regimen is a good long-term management solution with the least amount of side effects  The patient was agreeable and verbalized an understanding  The patient will follow-up in 6 months for medication prescription refill and reevaluation   The patient was advised to contact the office should their symptoms worsen in the interim  The patient was agreeable and verbalized an understanding  History of Present Illness: The patient is a 72 y o  male last seen on 3/31/2021 who presents for a follow up office visit in regards to Chronic pain syndrome secondary to herniated lumbar disc with stenosis and radiculopathy and chronic left hip pain  The patient currently reports that since his last office visit with the adjustment of the gabapentin to the 400 mg b i d  there is definite overall improvement and his pain is much more manageable  Patient reports that the only real limitation is that it is difficult for him to go up and down a ladder because he feels that his leg starts to become painful and weak only in that position  The patient reports that otherwise as long as he stays off of ladders his pain is minimal and very manageable  The patient presents today for regular medication follow-up visit  Current pain medications includes:   Gabapentin 400 mg b i d  The patient reports that this regimen is providing 90% pain relief  The patient is reporting no side effects from this pain medication regimen  I have personally reviewed and/or updated the patient's past medical history, past surgical history, family history, social history, current medications, allergies, and vital signs today  Review of Systems:    Review of Systems   Respiratory: Negative for shortness of breath  Cardiovascular: Negative for chest pain  Gastrointestinal: Negative for constipation, diarrhea, nausea and vomiting  Musculoskeletal: Positive for gait problem  Negative for arthralgias, joint swelling and myalgias  Skin: Negative for rash  Neurological: Negative for dizziness, seizures and weakness  All other systems reviewed and are negative          Past Medical History:   Diagnosis Date    Old myocardial infarction     Tear of medial meniscus of left knee, subsequent encounter     RESOLVE: [id number]       Past Surgical History:   Procedure Laterality Date    APPENDECTOMY      CORONARY ANGIOPLASTY      CORONARY ARTERY BYPASS GRAFT      KNEE ARTHROSCOPY      KNEE SURGERY Right     TONSILLECTOMY AND ADENOIDECTOMY      UMBILICAL HERNIA REPAIR         Family History   Adopted: Yes       Social History     Occupational History    Occupation: WORKING FULL TIME    Tobacco Use    Smoking status: Former Smoker     Packs/day: 2 00     Types: Cigarettes     Start date:      Quit date: 10/2/2013     Years since quittin 8    Smokeless tobacco: Former User    Tobacco comment: FORMER SMOKER AS PER ALL SCRIPTS    Vaping Use    Vaping Use: Never used   Substance and Sexual Activity    Alcohol use: No    Drug use: Never     Comment: Occasional    Sexual activity: Yes     Partners: Female     Birth control/protection: None         Current Outpatient Medications:     albuterol (2 5 mg/3 mL) 0 083 % nebulizer solution, Take 2 5 mg by nebulization 2 (two) times a day, Disp: , Rfl: 6    albuterol (PROVENTIL HFA,VENTOLIN HFA) 90 mcg/act inhaler, Inhale 2 puffs every 6 (six) hours as needed for wheezing, Disp: 6 7 g, Rfl: 1    atenolol (TENORMIN) 25 mg tablet, Take 1 tablet by mouth daily, Disp: , Rfl: 1    clopidogrel (PLAVIX) 75 mg tablet, Take 1 tablet (75 mg total) by mouth daily, Disp: 90 tablet, Rfl: 1    ezetimibe-simvastatin (VYTORIN) 10-40 mg per tablet, Take 1 tablet by mouth daily, Disp: , Rfl:     glimepiride (AMARYL) 1 mg tablet, Take 1 tablet (1 mg total) by mouth 2 (two) times a day, Disp: 180 tablet, Rfl: 1    HYDROcodone-acetaminophen (NORCO) 5-325 mg per tablet, Take 1 tablet by mouth every 6 (six) hours as needed for painMax Daily Amount: 4 tablets, Disp: 30 tablet, Rfl: 0    KOMBIGLYZE XR 5-1000 MG TB24, Take 1 tablet by mouth daily, Disp: 90 tablet, Rfl: 1    methocarbamol (ROBAXIN) 750 mg tablet, Take 1 tablet (750 mg total) by mouth every 6 (six) hours as needed for muscle spasms, Disp: 30 tablet, Rfl:

## 2021-07-26 DIAGNOSIS — E11.8 TYPE 2 DIABETES MELLITUS WITH COMPLICATION, WITHOUT LONG-TERM CURRENT USE OF INSULIN (HCC): ICD-10-CM

## 2021-07-27 DIAGNOSIS — E11.8 TYPE 2 DIABETES MELLITUS WITH COMPLICATION, WITHOUT LONG-TERM CURRENT USE OF INSULIN (HCC): ICD-10-CM

## 2021-07-27 RX ORDER — GLIMEPIRIDE 1 MG/1
TABLET ORAL
Qty: 180 TABLET | Refills: 1 | OUTPATIENT
Start: 2021-07-27

## 2021-07-27 RX ORDER — GLIMEPIRIDE 1 MG/1
1 TABLET ORAL 2 TIMES DAILY
Qty: 180 TABLET | Refills: 1 | Status: SHIPPED | OUTPATIENT
Start: 2021-07-27 | End: 2022-01-25 | Stop reason: SDUPTHER

## 2021-08-17 ENCOUNTER — OFFICE VISIT (OUTPATIENT)
Dept: SLEEP CENTER | Facility: HOSPITAL | Age: 66
End: 2021-08-17
Payer: COMMERCIAL

## 2021-08-17 VITALS
DIASTOLIC BLOOD PRESSURE: 60 MMHG | WEIGHT: 242 LBS | HEIGHT: 70 IN | BODY MASS INDEX: 34.65 KG/M2 | SYSTOLIC BLOOD PRESSURE: 114 MMHG

## 2021-08-17 DIAGNOSIS — G47.33 OBSTRUCTIVE SLEEP APNEA: Primary | ICD-10-CM

## 2021-08-17 DIAGNOSIS — E11.9 CONTROLLED TYPE 2 DIABETES MELLITUS WITHOUT COMPLICATION, WITHOUT LONG-TERM CURRENT USE OF INSULIN (HCC): ICD-10-CM

## 2021-08-17 DIAGNOSIS — E66.9 OBESITY (BMI 30-39.9): ICD-10-CM

## 2021-08-17 DIAGNOSIS — J44.0 COPD (CHRONIC OBSTRUCTIVE PULMONARY DISEASE) WITH ACUTE BRONCHITIS (HCC): ICD-10-CM

## 2021-08-17 DIAGNOSIS — I10 ESSENTIAL HYPERTENSION: ICD-10-CM

## 2021-08-17 DIAGNOSIS — R45.86 MOOD DISTURBANCE: ICD-10-CM

## 2021-08-17 DIAGNOSIS — J20.9 COPD (CHRONIC OBSTRUCTIVE PULMONARY DISEASE) WITH ACUTE BRONCHITIS (HCC): ICD-10-CM

## 2021-08-17 PROCEDURE — 99214 OFFICE O/P EST MOD 30 MIN: CPT | Performed by: INTERNAL MEDICINE

## 2021-08-17 NOTE — PROGRESS NOTES
Follow[facility]Up Note - [facility]   Shelbie Jacobson Misir  77 y o  male  Delio Escobar  WVW:[id number]  DOS:8/17/2021    CC: I saw this patient for follow-up in clinic today for Sleep disordered breathing, Coexisting Sleep and Medical Problems  He got a replacement ResMed med machine a few months ago (he had a [employer] 1 that broke after approximately 2 years)  A retitration study in 2018 demonstrated sleep disordered breathing was successfully remediated with PAP at 20/14 cm H2O  Mean oxygen saturation was 91% and there were some desaturations to a sonu of 86%  PFSH, Problem List, Medications & Allergies were reviewed in EMR  Interval changes: none reported  He  has a past medical history of Old myocardial infarction and Tear of medial meniscus of left knee, subsequent encounter  He has a current medication list which includes the following prescription(s): albuterol, atenolol, velvet contour monitor, blood glucose monitor system, clopidogrel, ezetimibe-simvastatin, gabapentin, glimepiride, hydrocodone-acetaminophen, kombiglyze xr, methocarbamol, quetiapine, sildenafil, and stiolto respimat  PHYSIOLOGICAL DATA REVIEW AND INTERPRETATION:  Data from his previous machine for 30 days ending 05/30/2021  using PAP > 4 hours/night 97%  Estimated BENJA 0 5/hour with pressure of 17 5/14cm H2O @90th percentile; Compliance: excellent; Sleep disordered breathing:stable & within target range; Patient has been using ozone based devices to sanitize only the mask hose and water chamber  SUBJECTIVE: Regarding use of PAP, Meli Huff reports:   · He is experiencing no  adverse effects: ; has not noticed any fibres or foreign material in air line  · He is benefiting from use: sleeping better   Sleep Routine: Meli Huff reports getting 6-8 hrs sleep  ; he has no difficulty initiating or maintaining sleep   He arises spontaneously and feels refreshed  Meli Huff denies Excessive Daytime Sleepiness,  and rated himself at Total score: 0 /24 on the [place] Sleepiness Scale  Habits: reports that he quit smoking about 7 years ago  His smoking use included cigarettes  He started smoking about 51 years ago  He smoked 2 00 packs per day  He has quit using smokeless tobacco ,  reports no history of alcohol use ,  reports no history of drug use , Caffeine use: limited , Exercise routine: sometimes   ROS: as attached  Significant for weight has been stable  He reported no nasal, respiratory or cardiac symptoms  Mood is stable on current medication  EXAM: /60   Ht 5' 10" (1 778 m)   Wt 110 kg (242 lb)   BMI 34 72 kg/m²     Patient is well groomed; well appearing  H&N: EOMI; NC/AT:no facial pressure marks, no rashes  Skin/Extrem: col & hydration normal; no edema  Psych: cooperativeand in no distress  Mental state:appears normal   Resp: Respiratory effort is normal  CNS: Alert, orientated, clear & coherent speech  Physical findings otherwise essentially unchanged from previous  IMPRESSION: Problem List Items & Comorbidities Addressed this Visit    1  Obstructive sleep apnea  PAP DME Resupply/Reorder   2  COPD (chronic obstructive pulmonary disease) with acute bronchitis ([facility] )     3  Essential hypertension     4  Mood disturbance     5  Obesity (BMI 30-39 9)     6  Controlled type 2 diabetes mellitus without complication, without long-term current use of insulin ([facility] )         PLAN:  1  I reviewed results of prior studies and physiologic data with the patient  I discussed treatment options with risks and benefits  2  Treatment with  PAP is medically necessary and Curlie Protestant is agreable to continue use  3  Care of equipment, methods to improve comfort using PAP and importance of compliance with therapy were discussed  4  Pressure setting: continue BiPAP auto  5  Rx provided to replace  supplies and Care coordinated with DME provider  6  Discussed strategies for weight reduction      7  Follow-up is advised in 1 year or sooner if needed to monitor progress, compliance and to adjust therapy  Thank you for allowing me to participate in the care of this patient  Sincerely,    Authenticated electronically by Phillip Milan MD on 85/85/24   Board Certified Specialist     Portions of the record may have been created with voice recognition software  Occasional wrong word or "sound a like" substitutions may have occurred due to the inherent limitations of voice recognition software  There may also be notations and random deletions of words or characters from malfunctioning software  Read the chart carefully and recognize, using context, where substitutions/deletions have occurred

## 2021-08-17 NOTE — PATIENT INSTRUCTIONS

## 2021-08-18 ENCOUNTER — TELEPHONE (OUTPATIENT)
Dept: SLEEP CENTER | Facility: CLINIC | Age: 66
End: 2021-08-18

## 2021-08-23 ENCOUNTER — OFFICE VISIT (OUTPATIENT)
Dept: FAMILY MEDICINE CLINIC | Facility: CLINIC | Age: 66
End: 2021-08-23
Payer: COMMERCIAL

## 2021-08-23 VITALS
TEMPERATURE: 98 F | WEIGHT: 243 LBS | DIASTOLIC BLOOD PRESSURE: 82 MMHG | RESPIRATION RATE: 20 BRPM | OXYGEN SATURATION: 97 % | BODY MASS INDEX: 34.79 KG/M2 | HEIGHT: 70 IN | SYSTOLIC BLOOD PRESSURE: 128 MMHG | HEART RATE: 57 BPM

## 2021-08-23 DIAGNOSIS — R31.0 GROSS HEMATURIA: Primary | ICD-10-CM

## 2021-08-23 LAB
SL AMB  POCT GLUCOSE, UA: ABNORMAL
SL AMB LEUKOCYTE ESTERASE,UA: ABNORMAL
SL AMB POCT BILIRUBIN,UA: ABNORMAL
SL AMB POCT BLOOD,UA: ABNORMAL
SL AMB POCT CLARITY,UA: CLEAR
SL AMB POCT COLOR,UA: YELLOW
SL AMB POCT KETONES,UA: ABNORMAL
SL AMB POCT NITRITE,UA: ABNORMAL
SL AMB POCT PH,UA: 7.5
SL AMB POCT SPECIFIC GRAVITY,UA: 1.02
SL AMB POCT URINE PROTEIN: ABNORMAL
SL AMB POCT UROBILINOGEN: NORMAL

## 2021-08-23 PROCEDURE — 99214 OFFICE O/P EST MOD 30 MIN: CPT | Performed by: NURSE PRACTITIONER

## 2021-08-23 PROCEDURE — 88112 CYTOPATH CELL ENHANCE TECH: CPT | Performed by: PATHOLOGY

## 2021-08-23 PROCEDURE — 81002 URINALYSIS NONAUTO W/O SCOPE: CPT | Performed by: NURSE PRACTITIONER

## 2021-08-23 NOTE — PROGRESS NOTES
Assessment/Plan   Problem List Items Addressed This Visit     None      Visit Diagnoses     Gross hematuria    -  Primary    Relevant Orders    POCT urine dip (Completed)    Cytology, urine    Urine culture    US kidney and bladder                Chief Complaint   Patient presents with    Blood in Urine     patient states the hematuria started yesterday morning, started out as dark red, fades throughout the day, patient denies any pain, pressure, itching, etc         Subjective   Patient ID: Panfilo Dobbs is a 77 y o  male  Vitals:    08/23/21 1354   BP: 128/82   Pulse: 57   Resp: 20   Temp: 98 °F (36 7 °C)   SpO2: 97%     Tea colored       Blood in Urine  This is a new problem  The current episode started yesterday  The problem has been gradually improving since onset  He describes the hematuria as gross (tea colored urine  yesterday-cleared through kathy day, returned this AM ) hematuria  He reports no clotting in his urine stream  His pain is at a severity of 0/10  He is experiencing no pain  He describes his urine color as tea colored  Irritative symptoms do not include frequency, nocturia or urgency  Obstructive symptoms do not include dribbling, incomplete emptying, an intermittent stream, a slower stream, straining or a weak stream  Pertinent negatives include no abdominal pain, chills, dysuria, facial swelling, fever, flank pain, genital pain, hesitancy, inability to urinate, nausea or vomiting  He is sexually active (not in a few weeks)  There is no history of BPH,  trauma, hypertension, kidney stones, prostatitis, recent infection, STDs or tobacco use  The following portions of the patient's history were reviewed and updated as appropriate: allergies, current medications, past family history, past medical history, past surgical history and problem list     Review of Systems   Constitutional: Negative  Negative for chills and fever  HENT: Negative  Negative for facial swelling      Eyes: Negative  Respiratory: Negative  Cardiovascular: Negative  Gastrointestinal: Negative  Negative for abdominal pain, nausea and vomiting  Endocrine: Negative  Genitourinary: Positive for hematuria  Negative for dysuria, flank pain, frequency, hesitancy, incomplete emptying, nocturia and urgency  Musculoskeletal: Negative  Skin: Negative  Allergic/Immunologic: Negative  Neurological: Negative  Hematological: Negative  Psychiatric/Behavioral: Negative  Objective     Physical Exam  Vitals and nursing note reviewed  Constitutional:       General: He is not in acute distress  Appearance: Normal appearance  He is not diaphoretic  HENT:      Head: Normocephalic and atraumatic  Nose: Nose normal  No congestion  Eyes:      General:         Right eye: No discharge  Left eye: No discharge  Extraocular Movements: Extraocular movements intact  Conjunctiva/sclera: Conjunctivae normal    Cardiovascular:      Rate and Rhythm: Normal rate and regular rhythm  Pulses: Normal pulses  Heart sounds: Murmur heard  Pulmonary:      Effort: Pulmonary effort is normal  No respiratory distress  Breath sounds: Normal breath sounds  No wheezing or rhonchi  Abdominal:      General: Bowel sounds are normal       Palpations: Abdomen is soft  There is no mass  Tenderness: There is no abdominal tenderness  There is no right CVA tenderness, left CVA tenderness or rebound  Hernia: No hernia is present  Musculoskeletal:         General: No swelling or tenderness  Normal range of motion  Cervical back: Normal range of motion  Right lower leg: No edema  Left lower leg: No edema  Skin:     General: Skin is warm and dry  Capillary Refill: Capillary refill takes less than 2 seconds  Coloration: Skin is not jaundiced  Findings: No erythema     Neurological:      Mental Status: He is alert and oriented to person, place, and time       Cranial Nerves: No cranial nerve deficit  Coordination: Coordination normal    Psychiatric:         Mood and Affect: Mood normal          Behavior: Behavior normal          Thought Content:  Thought content normal          Judgment: Judgment normal

## 2021-08-27 ENCOUNTER — HOSPITAL ENCOUNTER (OUTPATIENT)
Dept: ULTRASOUND IMAGING | Facility: HOSPITAL | Age: 66
Discharge: HOME | End: 2021-08-27
Payer: COMMERCIAL

## 2021-08-27 DIAGNOSIS — R31.0 GROSS HEMATURIA: ICD-10-CM

## 2021-08-27 PROCEDURE — 76770 US EXAM ABDO BACK WALL COMP: CPT

## 2021-09-15 ENCOUNTER — TELEPHONE (OUTPATIENT)
Dept: FAMILY MEDICINE CLINIC | Facility: CLINIC | Age: 66
End: 2021-09-15

## 2021-09-15 DIAGNOSIS — R31.9 PAINLESS HEMATURIA: Primary | ICD-10-CM

## 2021-09-15 NOTE — TELEPHONE ENCOUNTER
Patient said he just missed a call from someone that wanted to give him results  Was unsure who it was    [phone]  Thank you

## 2021-09-24 DIAGNOSIS — M54.50 LOW BACK PAIN WITHOUT SCIATICA, UNSPECIFIED BACK PAIN LATERALITY, UNSPECIFIED CHRONICITY: ICD-10-CM

## 2021-09-24 RX ORDER — HYDROCODONE BITARTRATE AND ACETAMINOPHEN 5; 325 MG/1; MG/1
1 TABLET ORAL EVERY 6 HOURS PRN
Qty: 30 TABLET | Refills: 0 | Status: SHIPPED | OUTPATIENT
Start: 2021-09-24 | End: 2021-11-12 | Stop reason: SDUPTHER

## 2021-09-27 ENCOUNTER — OFFICE VISIT (OUTPATIENT)
Dept: UROLOGY | Facility: HOSPITAL | Age: 66
End: 2021-09-27
Payer: COMMERCIAL

## 2021-09-27 VITALS
BODY MASS INDEX: 34.07 KG/M2 | DIASTOLIC BLOOD PRESSURE: 68 MMHG | HEIGHT: 70 IN | HEART RATE: 59 BPM | SYSTOLIC BLOOD PRESSURE: 122 MMHG | WEIGHT: 238 LBS

## 2021-09-27 DIAGNOSIS — N20.0 NEPHROLITHIASIS: Primary | ICD-10-CM

## 2021-09-27 DIAGNOSIS — R31.9 PAINLESS HEMATURIA: ICD-10-CM

## 2021-09-27 PROCEDURE — 99203 OFFICE O/P NEW LOW 30 MIN: CPT | Performed by: NURSE PRACTITIONER

## 2021-09-27 NOTE — PROGRESS NOTES
09/27/21    Zhen Odom   [birth year]   [id number]     Assessment  1 Gross hematuria  2 Nephrolithiasis     Discussion/Plan  1 Gross hematuria  2 Nephrolithiasis    8/27/21 Renal US: Nonobstructing 4 mm right renal calculus  1 8 cm simple right renal cyst  Mild nonspecific circumferential bladder wall thickening  Minimal post void urinary bladder residual volume   We reviewed ER precautions  Encouraged hydration with 64 oz water daily and avoidance of iced tea and bladder irritating beverages  Educational packet provided with dietary recommendations for patient to review  He will follow up in 1 year with [facility] Xray and Renal US to monitor stone burden and right renal cyst      Subjective  HPI   Bria Whitaker is a 77year old male who presents in consultation as a referral from PCP with gross hematuria  Renal US showed a nonobstructing 4 mm right renal stone and bladder wall thickening  Incidentally a 1 8 simple right renal cyst was seen  Patient is asymptomatic at this time  He experienced gross hematuria after mowing the lawn which lasted approximately 12 hours  He denies pain associated with this  He hydrated with upward of seven water bottles daily to flush out the blood  He admits to consuming a very large volume of iced tea  He consumes 16-24 oz of water daily  He has never had kidney stones before     PMH: CAD, DEEPTHI, COPD, DM2, HTN, chronic pain syndrome, obesity, hyperlipidemia, nephritis    Review of Systems - History obtained from chart review and the patient  General ROS: negative  Psychological ROS: negative  Ophthalmic ROS: negative  Hematological and Lymphatic ROS: negative  Endocrine ROS: negative  Breast ROS: negative  Respiratory ROS: no cough, shortness of breath, or wheezing  Cardiovascular ROS: no chest pain or dyspnea on exertion  Gastrointestinal ROS: no abdominal pain, change in bowel habits, or black or bloody stools  Genito-Urinary ROS: negative  Musculoskeletal ROS: negative  Neurological ROS: negative  Dermatological ROS: negative       Objective  Physical Exam  Vitals and nursing note reviewed  Constitutional:       General: He is awake  He is not in acute distress  Appearance: Normal appearance  He is well-developed, well-groomed and normal weight  He is not ill-appearing, toxic-appearing or diaphoretic  Pulmonary:      Effort: Pulmonary effort is normal    Abdominal:      Tenderness: There is no right CVA tenderness or left CVA tenderness  Musculoskeletal:         General: Normal range of motion  Cervical back: Normal range of motion and neck supple  Skin:     General: Skin is warm  Neurological:      General: No focal deficit present  Mental Status: He is alert and oriented to person, place, and time  Mental status is at baseline  Psychiatric:         Attention and Perception: Attention normal          Mood and Affect: Mood normal          Speech: Speech normal          Behavior: Behavior normal  Behavior is cooperative  Thought Content: Thought content normal          Cognition and Memory: Cognition normal          Judgment: Judgment normal            Narrative & Impression   RENAL ULTRASOUND     INDICATION:   R31 0: Gross hematuria      COMPARISON: None     TECHNIQUE:   Ultrasound of the retroperitoneum was performed with a curvilinear transducer utilizing volumetric sweeps and still imaging techniques       FINDINGS:     KIDNEYS:  Symmetric and normal size  Right kidney:  11 3 x 5 5 x 6 8 cm  Left kidney:  10 8 x 5 7 x 6 point cm      Right kidney  Normal echogenicity and contour  No suspicious masses detected  Simple cyst in the interpolar right kidney measures 1 3 x 1 8 x 1 5 cm  No hydronephrosis  Small nonobstructing 4 mm interpolar collecting system calculus present  No perinephric fluid collections      Left kidney  Normal echogenicity and contour  No suspicious masses detected  No hydronephrosis  No shadowing calculi    No perinephric fluid collections      URETERS:  Nonvisualized      BLADDER:   Normally distended  Bladder wall circumferentially thickened at 6 mm  No focal thickening identified  No discrete filling defect noted  Right ureteral jet identified; left ureteral jet is not witnessed  Post void residual bladder volume 27 mm      IMPRESSION:    1  Nonobstructing 4 mm right renal calculus  2   1 8 cm simple right renal cyst   3   Mild nonspecific circumferential bladder wall thickening  4   Minimal post void urinary bladder residual volume  Final Diagnosis   A  Urine, voided (ThinPrep):  Negative for high grade urothelial carcinoma ([address]) - see comment  Benign urothelial cells, squamous cells, red blood cells, neutrophils and bacteria         Component      Latest Ref Rng & Units 9/22/2018 9/15/2020           8:34 AM 12:45 PM   PSA, Total      0 0 - 4 0 ng/mL 0 5 0 6       Bernadene Fraction, CRNP

## 2021-11-12 DIAGNOSIS — M54.50 LOW BACK PAIN WITHOUT SCIATICA, UNSPECIFIED BACK PAIN LATERALITY, UNSPECIFIED CHRONICITY: ICD-10-CM

## 2021-11-12 DIAGNOSIS — R52 PAIN: ICD-10-CM

## 2021-11-12 RX ORDER — HYDROCODONE BITARTRATE AND ACETAMINOPHEN 5; 325 MG/1; MG/1
1 TABLET ORAL EVERY 6 HOURS PRN
Qty: 30 TABLET | Refills: 0 | Status: SHIPPED | OUTPATIENT
Start: 2021-11-12 | End: 2022-01-03 | Stop reason: SDUPTHER

## 2021-11-12 RX ORDER — METHOCARBAMOL 750 MG/1
750 TABLET, FILM COATED ORAL EVERY 6 HOURS PRN
Qty: 30 TABLET | Refills: 0 | Status: SHIPPED | OUTPATIENT
Start: 2021-11-12 | End: 2021-12-08 | Stop reason: SDUPTHER

## 2021-12-08 DIAGNOSIS — R52 PAIN: ICD-10-CM

## 2021-12-08 RX ORDER — METHOCARBAMOL 750 MG/1
750 TABLET, FILM COATED ORAL EVERY 6 HOURS PRN
Qty: 30 TABLET | Refills: 0 | Status: SHIPPED | OUTPATIENT
Start: 2021-12-08 | End: 2022-01-03 | Stop reason: SDUPTHER

## 2021-12-15 DIAGNOSIS — E11.9 CONTROLLED TYPE 2 DIABETES MELLITUS WITHOUT COMPLICATION, WITHOUT LONG-TERM CURRENT USE OF INSULIN (HCC): ICD-10-CM

## 2021-12-15 RX ORDER — SAXAGLIPTIN AND METFORMIN HYDROCHLORIDE 5; 1000 MG/1; MG/1
1 TABLET, FILM COATED, EXTENDED RELEASE ORAL DAILY
Qty: 90 TABLET | Refills: 1 | Status: SHIPPED | OUTPATIENT
Start: 2021-12-15 | End: 2021-12-21 | Stop reason: SDUPTHER

## 2021-12-15 RX ORDER — SAXAGLIPTIN AND METFORMIN HYDROCHLORIDE 5; 1000 MG/1; MG/1
1 TABLET, FILM COATED, EXTENDED RELEASE ORAL DAILY
Qty: 90 TABLET | Refills: 1 | Status: SHIPPED | OUTPATIENT
Start: 2021-12-15 | End: 2021-12-15 | Stop reason: SDUPTHER

## 2021-12-16 DIAGNOSIS — E11.9 CONTROLLED TYPE 2 DIABETES MELLITUS WITHOUT COMPLICATION, WITHOUT LONG-TERM CURRENT USE OF INSULIN (HCC): ICD-10-CM

## 2021-12-21 DIAGNOSIS — E11.9 CONTROLLED TYPE 2 DIABETES MELLITUS WITHOUT COMPLICATION, WITHOUT LONG-TERM CURRENT USE OF INSULIN (HCC): ICD-10-CM

## 2021-12-21 RX ORDER — SAXAGLIPTIN AND METFORMIN HYDROCHLORIDE 5; 1000 MG/1; MG/1
1 TABLET, FILM COATED, EXTENDED RELEASE ORAL DAILY
Qty: 90 TABLET | Refills: 1 | Status: SHIPPED | OUTPATIENT
Start: 2021-12-21 | End: 2021-12-22 | Stop reason: SDUPTHER

## 2021-12-22 DIAGNOSIS — E11.9 CONTROLLED TYPE 2 DIABETES MELLITUS WITHOUT COMPLICATION, WITHOUT LONG-TERM CURRENT USE OF INSULIN (HCC): ICD-10-CM

## 2021-12-22 RX ORDER — SAXAGLIPTIN AND METFORMIN HYDROCHLORIDE 5; 1000 MG/1; MG/1
1 TABLET, FILM COATED, EXTENDED RELEASE ORAL DAILY
Qty: 90 TABLET | Refills: 1 | Status: SHIPPED | OUTPATIENT
Start: 2021-12-22 | End: 2021-12-23 | Stop reason: SDUPTHER

## 2021-12-23 ENCOUNTER — TELEPHONE (OUTPATIENT)
Dept: FAMILY MEDICINE CLINIC | Facility: CLINIC | Age: 66
End: 2021-12-23

## 2021-12-23 DIAGNOSIS — E11.9 CONTROLLED TYPE 2 DIABETES MELLITUS WITHOUT COMPLICATION, WITHOUT LONG-TERM CURRENT USE OF INSULIN (HCC): ICD-10-CM

## 2021-12-23 RX ORDER — SAXAGLIPTIN AND METFORMIN HYDROCHLORIDE 5; 1000 MG/1; MG/1
1 TABLET, FILM COATED, EXTENDED RELEASE ORAL DAILY
Qty: 30 TABLET | Refills: 0 | Status: SHIPPED | OUTPATIENT
Start: 2021-12-23 | End: 2021-12-27 | Stop reason: SDUPTHER

## 2021-12-27 ENCOUNTER — TELEPHONE (OUTPATIENT)
Dept: FAMILY MEDICINE CLINIC | Facility: CLINIC | Age: 66
End: 2021-12-27

## 2021-12-27 DIAGNOSIS — E11.9 CONTROLLED TYPE 2 DIABETES MELLITUS WITHOUT COMPLICATION, WITHOUT LONG-TERM CURRENT USE OF INSULIN (HCC): ICD-10-CM

## 2021-12-27 RX ORDER — SAXAGLIPTIN AND METFORMIN HYDROCHLORIDE 5; 1000 MG/1; MG/1
1 TABLET, FILM COATED, EXTENDED RELEASE ORAL DAILY
Qty: 90 TABLET | Refills: 1 | Status: SHIPPED | OUTPATIENT
Start: 2021-12-27 | End: 2021-12-27 | Stop reason: SDUPTHER

## 2022-01-03 DIAGNOSIS — R52 PAIN: ICD-10-CM

## 2022-01-03 DIAGNOSIS — M54.50 LOW BACK PAIN WITHOUT SCIATICA, UNSPECIFIED BACK PAIN LATERALITY, UNSPECIFIED CHRONICITY: ICD-10-CM

## 2022-01-03 RX ORDER — METHOCARBAMOL 750 MG/1
750 TABLET, FILM COATED ORAL EVERY 6 HOURS PRN
Qty: 30 TABLET | Refills: 0 | Status: SHIPPED | OUTPATIENT
Start: 2022-01-03 | End: 2022-01-25 | Stop reason: SDUPTHER

## 2022-01-03 RX ORDER — HYDROCODONE BITARTRATE AND ACETAMINOPHEN 5; 325 MG/1; MG/1
1 TABLET ORAL EVERY 6 HOURS PRN
Qty: 30 TABLET | Refills: 0 | Status: SHIPPED | OUTPATIENT
Start: 2022-01-03 | End: 2022-02-21 | Stop reason: SDUPTHER

## 2022-01-05 ENCOUNTER — OFFICE VISIT (OUTPATIENT)
Dept: PAIN MEDICINE | Facility: CLINIC | Age: 67
End: 2022-01-05
Payer: COMMERCIAL

## 2022-01-05 VITALS
WEIGHT: 240 LBS | SYSTOLIC BLOOD PRESSURE: 120 MMHG | HEART RATE: 58 BPM | BODY MASS INDEX: 34.36 KG/M2 | HEIGHT: 70 IN | TEMPERATURE: 97.9 F | DIASTOLIC BLOOD PRESSURE: 72 MMHG

## 2022-01-05 DIAGNOSIS — M48.061 LUMBAR FORAMINAL STENOSIS: ICD-10-CM

## 2022-01-05 DIAGNOSIS — M54.40 LOW BACK PAIN WITH SCIATICA, SCIATICA LATERALITY UNSPECIFIED, UNSPECIFIED BACK PAIN LATERALITY, UNSPECIFIED CHRONICITY: ICD-10-CM

## 2022-01-05 DIAGNOSIS — M51.27 HERNIATED NUCLEUS PULPOSUS, L5-S1: ICD-10-CM

## 2022-01-05 DIAGNOSIS — M25.552 LEFT HIP PAIN: ICD-10-CM

## 2022-01-05 DIAGNOSIS — G89.4 CHRONIC PAIN SYNDROME: Primary | ICD-10-CM

## 2022-01-05 DIAGNOSIS — M54.16 LUMBAR RADICULOPATHY: ICD-10-CM

## 2022-01-05 PROCEDURE — 99214 OFFICE O/P EST MOD 30 MIN: CPT | Performed by: NURSE PRACTITIONER

## 2022-01-05 RX ORDER — GABAPENTIN 400 MG/1
CAPSULE ORAL
Qty: 60 CAPSULE | Refills: 8 | Status: SHIPPED | OUTPATIENT
Start: 2022-01-05 | End: 2022-03-17 | Stop reason: SDUPTHER

## 2022-01-05 NOTE — PROGRESS NOTES
Assessment:  1  Chronic pain syndrome    2  Low back pain with sciatica, sciatica laterality unspecified, unspecified back pain laterality, unspecified chronicity    3  Lumbar radiculopathy    4  Left hip pain    5  Herniated nucleus pulposus, L5-S1    6  Lumbar foraminal stenosis        Plan:  While the patient was in the office today, I did have a thorough conversation with the patient regarding their chronic pain syndrome, symptoms, medication regimen, and treatment plan  I did discuss with the patient that at this point time since he is on a subtherapeutic dose of gabapentin, we could consider increasing the gabapentin to a higher dosage or more therapeutic dose  However, for now, the patient preferred to continue with the gabapentin 400 mg b i d  dosing and see how he does and understands that if he feels he needs to increase the gabapentin before his next office visit, he should call our office before he is due for a refill  The patient was agreeable and verbalized an understanding  The patient will follow-up in 36 weeks for medication prescription refill and reevaluation  The patient was advised to contact the office should their symptoms worsen in the interim  The patient was agreeable and verbalized an understanding  History of Present Illness: The patient is a 77 y o  male last seen on 7/21/2021 who presents for a follow up office visit in regards to chronic pain syndrome, as the patient's pain has been ongoing for greater than a year, secondary to herniated lumbar disc with stenosis radiculopathy and chronic left hip pain  The patient currently reports that since his last office visit although over the holidays he did notice an increase in his pain as he feels that it was due to the increased activity, his pain symptoms have remained relatively stable and at this point are slowly returning back to his normal baseline which he feels is very manageable    The patient presents today for regular medication follow-up visit  Current pain medications includes:  Gabapentin 400 mg b i d  The patient reports that this regimen is providing 50% pain relief  The patient is reporting no side effects from this pain medication regimen  I have personally reviewed and/or updated the patient's past medical history, past surgical history, family history, social history, current medications, allergies, and vital signs today  Review of Systems:    Review of Systems   Respiratory: Negative for shortness of breath  Cardiovascular: Negative for chest pain  Gastrointestinal: Negative for constipation, diarrhea, nausea and vomiting  Musculoskeletal: Positive for gait problem  Negative for arthralgias, joint swelling and myalgias  Skin: Negative for rash  Neurological: Negative for dizziness, seizures and weakness  All other systems reviewed and are negative          Past Medical History:   Diagnosis Date    Old myocardial infarction     Tear of medial meniscus of left knee, subsequent encounter     RESOLVE: [id number]       Past Surgical History:   Procedure Laterality Date    APPENDECTOMY      CORONARY ANGIOPLASTY      CORONARY ARTERY BYPASS GRAFT      KNEE ARTHROSCOPY      KNEE SURGERY Right     TONSILLECTOMY AND ADENOIDECTOMY      UMBILICAL HERNIA REPAIR         Family History   Adopted: Yes       Social History     Occupational History    Occupation: WORKING FULL TIME    Tobacco Use    Smoking status: Former Smoker     Packs/day: 2 00     Types: Cigarettes     Start date:      Quit date: 10/2/2013     Years since quittin 2    Smokeless tobacco: Former User    Tobacco comment: FORMER SMOKER AS PER ALL SCRIPTS    Vaping Use    Vaping Use: Never used   Substance and Sexual Activity    Alcohol use: Yes     Comment: rare    Drug use: Never     Comment: Occasional    Sexual activity: Yes     Partners: Female     Birth control/protection: None         Current Outpatient Medications:     albuterol (2 5 mg/3 mL) 0 083 % nebulizer solution, Take 2 5 mg by nebulization 2 (two) times a day, Disp: , Rfl: 6    atenolol (TENORMIN) 25 mg tablet, Take 1 tablet by mouth daily, Disp: , Rfl: 1    clopidogrel (PLAVIX) 75 mg tablet, Take 1 tablet (75 mg total) by mouth daily, Disp: 90 tablet, Rfl: 1    ezetimibe-simvastatin (VYTORIN) 10-40 mg per tablet, Take 1 tablet by mouth daily, Disp: , Rfl:     gabapentin (NEURONTIN) 400 mg capsule, Take 1 PO BID , Disp: 60 capsule, Rfl: 8    glimepiride (AMARYL) 1 mg tablet, Take 1 tablet (1 mg total) by mouth 2 (two) times a day, Disp: 180 tablet, Rfl: 1    HYDROcodone-acetaminophen (NORCO) 5-325 mg per tablet, Take 1 tablet by mouth every 6 (six) hours as needed for pain Max Daily Amount: 4 tablets, Disp: 30 tablet, Rfl: 0    Kombiglyze XR 5-1000 MG TB24, Take 1 tablet by mouth daily, Disp: 90 tablet, Rfl: 1    methocarbamol (ROBAXIN) 750 mg tablet, Take 1 tablet (750 mg total) by mouth every 6 (six) hours as needed for muscle spasms, Disp: 30 tablet, Rfl: 0    QUEtiapine (SEROQUEL) 100 mg tablet, Take 1 tablet by mouth daily, Disp: , Rfl:     sildenafil (VIAGRA) 100 mg tablet, as needed , Disp: , Rfl: 1    Blood Glucose Monitoring Suppl (RUBINA CONTOUR MONITOR) MIGUEL ANGEL, by Does not apply route 2 (two) times a day, Disp: 1 Device, Rfl: 0    Blood Glucose Monitoring Suppl (BLOOD GLUCOSE MONITOR SYSTEM) w/Device KIT, by Does not apply route 2 (two) times a day, Disp: 100 each, Rfl: 3    Allergies   Allergen Reactions    Penicillins Other (See Comments)     hives-emily cefazolin       Physical Exam:    /72 (BP Location: Left arm, Patient Position: Sitting, Cuff Size: Standard)   Pulse 58   Temp 97 9 °F (36 6 °C)   Ht 5' 10" (1 778 m)   Wt 109 kg (240 lb)   BMI 34 44 kg/m²     Constitutional:normal, well developed, well nourished, alert, in no distress and non-toxic and no overt pain behavior   and overweight  Eyes:anicteric  HEENT:grossly intact  Neck:supple, symmetric, trachea midline and no masses   Pulmonary:even and unlabored  Cardiovascular:No edema or pitting edema present  Skin:Normal without rashes or lesions and well hydrated  Psychiatric:Mood and affect appropriate  Neurologic:Cranial Nerves II-XII grossly intact  Musculoskeletal:normal      Imaging  No orders to display         No orders of the defined types were placed in this encounter

## 2022-01-25 DIAGNOSIS — R52 PAIN: ICD-10-CM

## 2022-01-25 DIAGNOSIS — E11.8 TYPE 2 DIABETES MELLITUS WITH COMPLICATION, WITHOUT LONG-TERM CURRENT USE OF INSULIN (HCC): ICD-10-CM

## 2022-01-25 RX ORDER — METHOCARBAMOL 750 MG/1
750 TABLET, FILM COATED ORAL EVERY 6 HOURS PRN
Qty: 30 TABLET | Refills: 0 | Status: SHIPPED | OUTPATIENT
Start: 2022-01-25 | End: 2022-02-21 | Stop reason: SDUPTHER

## 2022-01-25 RX ORDER — GLIMEPIRIDE 1 MG/1
1 TABLET ORAL 2 TIMES DAILY
Qty: 180 TABLET | Refills: 0 | Status: SHIPPED | OUTPATIENT
Start: 2022-01-25 | End: 2022-04-29 | Stop reason: SDUPTHER

## 2022-02-07 ENCOUNTER — TELEPHONE (OUTPATIENT)
Dept: FAMILY MEDICINE CLINIC | Facility: CLINIC | Age: 67
End: 2022-02-07

## 2022-02-07 NOTE — TELEPHONE ENCOUNTER
Pt lvm requesting albuterol sulfate inhaler which has not been prescribed in a while  Attempted call to pt to ask if he is having cough or SOB or if this is something he keeps on hand  No answer/no voicemail  Will try again

## 2022-02-08 DIAGNOSIS — J44.9 CHRONIC OBSTRUCTIVE PULMONARY DISEASE, UNSPECIFIED COPD TYPE (HCC): Primary | ICD-10-CM

## 2022-02-08 RX ORDER — ALBUTEROL SULFATE 90 UG/1
2 AEROSOL, METERED RESPIRATORY (INHALATION) EVERY 6 HOURS PRN
Qty: 18 G | Refills: 1 | Status: SHIPPED | OUTPATIENT
Start: 2022-02-08 | End: 2022-03-17

## 2022-02-08 NOTE — TELEPHONE ENCOUNTER
Spoke to pt via telephone, pt requests the   albuterol (PROVENTIL HFA,VENTOLIN HFA) 90 mcg/act inhaler be sent to professional pharmacy  Pt reports he does not have any new issues he keeps the inhaler on hand

## 2022-02-21 DIAGNOSIS — M54.50 LOW BACK PAIN WITHOUT SCIATICA, UNSPECIFIED BACK PAIN LATERALITY, UNSPECIFIED CHRONICITY: ICD-10-CM

## 2022-02-21 DIAGNOSIS — R52 PAIN: ICD-10-CM

## 2022-02-21 RX ORDER — METHOCARBAMOL 750 MG/1
750 TABLET, FILM COATED ORAL EVERY 6 HOURS PRN
Qty: 30 TABLET | Refills: 0 | Status: SHIPPED | OUTPATIENT
Start: 2022-02-21 | End: 2022-04-01 | Stop reason: SDUPTHER

## 2022-02-21 RX ORDER — HYDROCODONE BITARTRATE AND ACETAMINOPHEN 5; 325 MG/1; MG/1
1 TABLET ORAL EVERY 6 HOURS PRN
Qty: 30 TABLET | Refills: 0 | Status: SHIPPED | OUTPATIENT
Start: 2022-02-21 | End: 2022-04-01 | Stop reason: SDUPTHER

## 2022-02-21 RX ORDER — TIOTROPIUM BROMIDE AND OLODATEROL 3.124; 2.736 UG/1; UG/1
2 SPRAY, METERED RESPIRATORY (INHALATION) DAILY
COMMUNITY
Start: 2021-12-17

## 2022-02-21 RX ORDER — NITROGLYCERIN 400 UG/1
SPRAY ORAL
COMMUNITY
Start: 2022-02-07

## 2022-02-22 ENCOUNTER — APPOINTMENT (OUTPATIENT)
Dept: RADIOLOGY | Facility: CLINIC | Age: 67
End: 2022-02-22
Payer: COMMERCIAL

## 2022-02-22 ENCOUNTER — OFFICE VISIT (OUTPATIENT)
Dept: FAMILY MEDICINE CLINIC | Facility: CLINIC | Age: 67
End: 2022-02-22
Payer: COMMERCIAL

## 2022-02-22 VITALS
HEART RATE: 57 BPM | BODY MASS INDEX: 35.04 KG/M2 | RESPIRATION RATE: 20 BRPM | HEIGHT: 69 IN | OXYGEN SATURATION: 97 % | SYSTOLIC BLOOD PRESSURE: 138 MMHG | TEMPERATURE: 98.1 F | DIASTOLIC BLOOD PRESSURE: 82 MMHG | WEIGHT: 236.6 LBS

## 2022-02-22 DIAGNOSIS — S49.91XA INJURY OF RIGHT SHOULDER, INITIAL ENCOUNTER: ICD-10-CM

## 2022-02-22 DIAGNOSIS — Z12.5 SCREENING FOR PROSTATE CANCER: ICD-10-CM

## 2022-02-22 DIAGNOSIS — Z00.00 ENCOUNTER FOR WELL ADULT EXAM WITHOUT ABNORMAL FINDINGS: Primary | ICD-10-CM

## 2022-02-22 DIAGNOSIS — Z79.899 HIGH RISK MEDICATION USE: ICD-10-CM

## 2022-02-22 DIAGNOSIS — Z13.6 SCREENING FOR CARDIOVASCULAR CONDITION: ICD-10-CM

## 2022-02-22 DIAGNOSIS — E11.9 CONTROLLED TYPE 2 DIABETES MELLITUS WITHOUT COMPLICATION, WITHOUT LONG-TERM CURRENT USE OF INSULIN (HCC): ICD-10-CM

## 2022-02-22 DIAGNOSIS — E78.5 HYPERLIPIDEMIA, UNSPECIFIED HYPERLIPIDEMIA TYPE: ICD-10-CM

## 2022-02-22 LAB — SL AMB POCT HEMOGLOBIN AIC: 7.4 (ref ?–6.5)

## 2022-02-22 PROCEDURE — 83036 HEMOGLOBIN GLYCOSYLATED A1C: CPT | Performed by: FAMILY MEDICINE

## 2022-02-22 PROCEDURE — 99397 PER PM REEVAL EST PAT 65+ YR: CPT | Performed by: FAMILY MEDICINE

## 2022-02-22 PROCEDURE — 73030 X-RAY EXAM OF SHOULDER: CPT

## 2022-02-22 NOTE — PATIENT INSTRUCTIONS
Weight Management   AMBULATORY CARE:   Why it is important to manage your weight:  Being overweight increases your risk of health conditions such as heart disease, high blood pressure, type 2 diabetes, and certain types of cancer  It can also increase your risk for osteoarthritis, sleep apnea, and other respiratory problems  Aim for a slow, steady weight loss  Even a small amount of weight loss can lower your risk of health problems  How to lose weight safely:  A safe and healthy way to lose weight is to eat fewer calories and get regular exercise  · You can lose up about 1 pound a week by decreasing the number of calories you eat by 500 calories each day  You can decrease calories by eating smaller portion sizes or by cutting out high-calorie foods  Read labels to find out how many calories are in the foods you eat  · You can also burn calories with exercise such as walking, swimming, or biking  You will be more likely to keep weight off if you make these changes part of your lifestyle  Exercise at least 30 minutes per day on most days of the week  You can also fit in more physical activity by taking the stairs instead of the elevator or parking farther away from stores  Ask your healthcare provider about the best exercise plan for you  Healthy meal plan for weight management:  A healthy meal plan includes a variety of foods, contains fewer calories, and helps you stay healthy  A healthy meal plan includes the following:     · Eat whole-grain foods more often  A healthy meal plan should contain fiber  Fiber is the part of grains, fruits, and vegetables that is not broken down by your body  Whole-grain foods are healthy and provide extra fiber in your diet  Some examples of whole-grain foods are whole-wheat breads and pastas, oatmeal, brown rice, and bulgur  · Eat a variety of vegetables every day  Include dark, leafy greens such as spinach, kale, ashvin greens, and mustard greens   Eat yellow and orange vegetables such as carrots, sweet potatoes, and winter squash  · Eat a variety of fruits every day  Choose fresh or canned fruit (canned in its own juice or light syrup) instead of juice  Fruit juice has very little or no fiber  · Eat low-fat dairy foods  Drink fat-free (skim) milk or 1% milk  Eat fat-free yogurt and low-fat cottage cheese  Try low-fat cheeses such as mozzarella and other reduced-fat cheeses  · Choose meat and other protein foods that are low in fat  Choose beans or other legumes such as split peas or lentils  Choose fish, skinless poultry (chicken or turkey), or lean cuts of red meat (beef or pork)  Before you cook meat or poultry, cut off any visible fat  · Use less fat and oil  Try baking foods instead of frying them  Add less fat, such as margarine, sour cream, regular salad dressing and mayonnaise to foods  Eat fewer high-fat foods  Some examples of high-fat foods include french fries, doughnuts, ice cream, and cakes  · Eat fewer sweets  Limit foods and drinks that are high in sugar  This includes candy, cookies, regular soda, and sweetened drinks  Ways to decrease calories:   · Eat smaller portions  ? Use a small plate with smaller servings  ? Do not eat second helpings  ? When you eat at a restaurant, ask for a box and place half of your meal in the box before you eat  ? Share an entrée with someone else  · Replace high-calorie snacks with healthy, low-calorie snacks  ? Choose fresh fruit, vegetables, fat-free rice cakes, or air-popped popcorn instead of potato chips, nuts, or chocolate  ? Choose water or calorie-free drinks instead of soda or sweetened drinks  · Do not shop for groceries when you are hungry  You may be more likely to make unhealthy food choices  Take a grocery list of healthy foods and shop after you have eaten  · Eat regular meals  Do not skip meals  Skipping meals can lead to overeating later in the day  This can make it harder for you to lose weight  Eat a healthy snack in place of a meal if you do not have time to eat a regular meal  Talk with a dietitian to help you create a meal plan and schedule that is right for you  Other things to consider as you try to lose weight:   · Be aware of situations that may give you the urge to overeat, such as eating while watching television  Find ways to avoid these situations  For example, read a book, go for a walk, or do crafts  · Meet with a weight loss support group or friends who are also trying to lose weight  This may help you stay motivated to continue working on your weight loss goals  © Copyright [employer] 2021 Information is for End User's use only and may not be sold, redistributed or otherwise used for commercial purposes  All illustrations and images included in CareNotes® are the copyrighted property of A D A M [employer] Inc  or [employer]   The above information is an  only  It is not intended as medical advice for individual conditions or treatments  Talk to your doctor, nurse or pharmacist before following any medical regimen to see if it is safe and effective for you  Heart Healthy Diet   AMBULATORY CARE:   A heart healthy diet  is an eating plan low in unhealthy fats and sodium (salt)  The plan is high in healthy fats and fiber  A heart healthy diet helps improve your cholesterol levels and lowers your risk for heart disease and stroke  A dietitian will teach you how to read and understand food labels  Heart healthy diet guidelines to follow:   · Choose foods that contain healthy fats  ? Unsaturated fats  include monounsaturated and polyunsaturated fats  Unsaturated fat is found in foods such as soybean, canola, olive, corn, and safflower oils  It is also found in soft tub margarine that is made with liquid vegetable oil      ? Omega-3 fat  is found in certain fish, such as salmon, tuna, and trout, and in walnuts and flaxseed  Eat fish high in omega-3 fats at least 2 times a week  · Get 20 to 30 grams of fiber each day  Fruits, vegetables, whole-grain foods, and legumes (cooked beans) are good sources of fiber  · Limit or do not have unhealthy fats  ? Cholesterol  is found in animal foods, such as eggs and lobster, and in dairy products made from whole milk  Limit cholesterol to less than 200 mg each day  ? Saturated fat  is found in meats, such as edwards and hamburger  It is also found in chicken or turkey skin, whole milk, and butter  Limit saturated fat to less than 7% of your total daily calories  ? Trans fat  is found in packaged foods, such as potato chips and cookies  It is also in hard margarine, some fried foods, and shortening  Do not eat foods that contain trans fats  · Limit sodium as directed  You may be told to limit sodium to 2,000 to 2,300 mg each day  Choose low-sodium or no-salt-added foods  Add little or no salt to food you prepare  Use herbs and spices in place of salt  Include the following in your heart healthy plan:  Ask your dietitian or healthcare provider how many servings to have from each of the following food groups:  · Grains:      ? Whole-wheat breads, cereals, and pastas, and brown rice    ? Low-fat, low-sodium crackers and chips    · Vegetables:      ? Broccoli, green beans, green peas, and spinach    ? Collards, kale, and lima beans    ? Carrots, sweet potatoes, tomatoes, and peppers    ? Canned vegetables with no salt added    · Fruits:      ? Bananas, peaches, pears, and pineapple    ? Grapes, raisins, and dates    ? Oranges, tangerines, grapefruit, orange juice, and grapefruit juice    ? Apricots, mangoes, melons, and papaya    ? Raspberries and strawberries    ? Canned fruit with no added sugar    · Low-fat dairy:      ? Nonfat (skim) milk, 1% milk, and low-fat almond, cashew, or soy milks fortified with calcium    ?  Low-fat cheese, regular or frozen yogurt, and cottage cheese    · Meats and proteins:      ? Lean cuts of beef and pork (loin, leg, round), skinless chicken and turkey    ? Legumes, soy products, egg whites, or nuts    Limit or do not include the following in your heart healthy plan:   · Unhealthy fats and oils:      ? Whole or 2% milk, cream cheese, sour cream, or cheese    ? High-fat cuts of beef (T-bone steaks, ribs), chicken or turkey with skin, and organ meats such as liver    ? Butter, stick margarine, shortening, and cooking oils such as coconut or palm oil    · Foods and liquids high in sodium:      ? Packaged foods, such as frozen dinners, cookies, macaroni and cheese, and cereals with more than 300 mg of sodium per serving    ? Vegetables with added sodium, such as instant potatoes, vegetables with added sauces, or regular canned vegetables    ? Cured or smoked meats, such as hot dogs, edwards, and sausage    ? High-sodium ketchup, barbecue sauce, salad dressing, pickles, olives, soy sauce, or miso    · Foods and liquids high in sugar:      ? Candy, cake, cookies, pies, or doughnuts    ? Soft drinks (soda), sports drinks, or sweetened tea    ? Canned or dry mixes for cakes, soups, sauces, or gravies    Other healthy heart guidelines:   · Do not smoke  Nicotine and other chemicals in cigarettes and cigars can cause lung and heart damage  Ask your healthcare provider for information if you currently smoke and need help to quit  E-cigarettes or smokeless tobacco still contain nicotine  Talk to your healthcare provider before you use these products  · Limit or do not drink alcohol as directed  Alcohol can damage your heart and raise your blood pressure  Your healthcare provider may give you specific daily and weekly limits  The general recommended limit is 1 drink a day for women 21 or older and for men 72 or older  Do not have more than 3 drinks in a day or 7 in a week   The recommended limit is 2 drinks a day for men 21 to 64 years of age  Roman Bunkers not have more than 4 drinks in a day or 14 in a week  A drink of alcohol is 12 ounces of beer, 5 ounces of wine, or 1½ ounces of liquor  · Exercise regularly  Exercise can help you maintain a healthy weight and improve your blood pressure and cholesterol levels  Regular exercise can also decrease your risk for heart problems  Ask your healthcare provider about the best exercise plan for you  Do not start an exercise program without asking your healthcare provider  Follow up with your doctor or cardiologist as directed:  Write down your questions so you remember to ask them during your visits  © Copyright [employer] 2021 Information is for End User's use only and may not be sold, redistributed or otherwise used for commercial purposes  All illustrations and images included in CareNotes® are the copyrighted property of A D A M , Inc  or [facility]igle   The above information is an  only  It is not intended as medical advice for individual conditions or treatments  Talk to your doctor, nurse or pharmacist before following any medical regimen to see if it is safe and effective for you

## 2022-02-22 NOTE — PROGRESS NOTES
Assessment/Plan:       Diagnoses and all orders for this visit:    Encounter for well adult exam without abnormal findings    Controlled type 2 diabetes mellitus without complication, without long-term current use of insulin ([facility] [address] )  -     POCT hemoglobin A1c  -     Comprehensive metabolic panel; Future  -     UA (URINE) with reflex to Scope  -     Microalbumin / creatinine urine ratio; Future    Injury of right shoulder, initial encounter  -     XR shoulder 2+ vw right; Future  -     Ambulatory Referral to Physical Therapy; Future    High risk medication use  -     CBC and differential; Future  -     Comprehensive metabolic panel; Future  -     UA (URINE) with reflex to Scope  -     Microalbumin / creatinine urine ratio; Future    Screening for prostate cancer  -     PSA, Total Screen; Future    Hyperlipidemia, unspecified hyperlipidemia type  -     Lipid panel; Future    Screening for cardiovascular condition  -     CBC and differential; Future  -     Comprehensive metabolic panel; Future  -     UA (URINE) with reflex to Scope    Other orders  -     nitroglycerin (NITROLINGUAL) 0 4 mg/spray spray; PLACE 1 SPRAY UNDER THE TONGUE EVERY 5 MINUTES FOR A MAXIMUM OF 3 DOSES AS DIRECTED FOR CHEST PAIN  -     Stiolto Respimat 2 5-2 5 MCG/ACT inhaler; Inhale 2 puffs daily (Patient not taking: Reported on 2/22/2022 )       Patient's A1c is 7 49 will not make any changes but discussed better ways of improving his diet to help with this   Labs ordered  Patient will follow with pain management his other chronic conditions are stable  He can follow up in 6 months or sooner if needed   x-ray and physical therapy ordered for his right shoulder injury    Subjective:     Chief Complaint   Patient presents with    Physical Exam    Shoulder Pain     fall in october on right shoulder, sore upon moving or sleeping on it  Patient ID: Patricia Shah is a 77 y o  male       Patient is here for physical   He reports having right shoulder pain after a fall on ice 3 weeks ago  Otherwise he has no other issues today      The following portions of the patient's history were reviewed and updated as appropriate: allergies, current medications, past family history, past medical history, past social history, past surgical history and problem list     Review of Systems   Constitutional: Negative  HENT: Negative  Eyes: Negative  Respiratory: Negative  Cardiovascular: Negative  Gastrointestinal: Negative  Endocrine: Negative  Genitourinary: Negative  Musculoskeletal: Negative  Skin: Negative  Allergic/Immunologic: Negative  Neurological: Negative  Hematological: Negative  Psychiatric/Behavioral: Negative  All other systems reviewed and are negative  Objective:    Vitals:    02/22/22 1555   BP: 138/82   BP Location: Left arm   Patient Position: Sitting   Cuff Size: Standard   Pulse: 57   Resp: 20   Temp: 98 1 °F (36 7 °C)   TempSrc: Tympanic   SpO2: 97%   Weight: 107 kg (236 lb 9 6 oz)   Height: 5' 9 4" (1 763 m)          Physical Exam  Vitals and nursing note reviewed  Constitutional:       General: He is not in acute distress  Appearance: He is well-developed  HENT:      Head: Normocephalic  Right Ear: External ear normal       Left Ear: External ear normal       Nose: Nose normal    Eyes:      General:         Right eye: No discharge  Left eye: No discharge  Conjunctiva/sclera: Conjunctivae normal       Pupils: Pupils are equal, round, and reactive to light  Cardiovascular:      Rate and Rhythm: Normal rate and regular rhythm  Pulses: no weak pulses          Dorsalis pedis pulses are 2+ on the right side and 2+ on the left side  Posterior tibial pulses are 2+ on the right side and 2+ on the left side  Heart sounds: Normal heart sounds  Pulmonary:      Effort: Pulmonary effort is normal       Breath sounds: Normal breath sounds     Abdominal:      General: Bowel sounds are normal  There is no distension  Palpations: Abdomen is soft  Tenderness: There is no abdominal tenderness  Musculoskeletal:         General: Normal range of motion  Cervical back: Normal range of motion  Feet:      Right foot:      Skin integrity: Callus and dry skin present  No ulcer, skin breakdown, erythema or warmth  Left foot:      Skin integrity: Callus and dry skin present  No ulcer, skin breakdown, erythema or warmth  Skin:     General: Skin is warm and dry  Findings: No rash  Neurological:      Mental Status: He is alert and oriented to person, place, and time  Cranial Nerves: No cranial nerve deficit  Psychiatric:         Behavior: Behavior normal          Thought Content: Thought content normal          Judgment: Judgment normal            Diabetic Foot Exam    Patient's shoes and socks removed  Right Foot/Ankle   Right Foot Inspection  Skin Exam: skin normal, dry skin, callus and callus  Skin not intact, no warmth, no erythema, no maceration, no abnormal color, no pre-ulcer and no ulcer  Toe Exam: ROM and strength within normal limits  Sensory   Vibration: intact  Proprioception: intact  Monofilament testing: intact    Vascular  Capillary refills: < 3 seconds  The right DP pulse is 2+  The right PT pulse is 2+  Left Foot/Ankle  Left Foot Inspection  Skin Exam: skin normal, dry skin and callus  Skin not intact, no warmth, no erythema, no maceration, normal color, no pre-ulcer and no ulcer  Toe Exam: ROM and strength within normal limits  Sensory   Vibration: intact  Proprioception: intact  Monofilament testing: intact    Vascular  Capillary refills: < 3 seconds  The left DP pulse is 2+  The left PT pulse is 2+  Assign Risk Category  No deformity present  No loss of protective sensation  No weak pulses  Risk: 0        BMI Counseling: Body mass index is 34 54 kg/m²   The BMI is above normal  Nutrition recommendations include reducing portion sizes, decreasing overall calorie intake, 3-5 servings of fruits/vegetables daily, reducing fast food intake, consuming healthier snacks, decreasing soda and/or juice intake, moderation in carbohydrate intake, increasing intake of lean protein, reducing intake of saturated fat and trans fat and reducing intake of cholesterol  Exercise recommendations include exercising 3-5 times per week

## 2022-03-16 ENCOUNTER — OFFICE VISIT (OUTPATIENT)
Dept: OBGYN CLINIC | Facility: CLINIC | Age: 67
End: 2022-03-16
Payer: COMMERCIAL

## 2022-03-16 VITALS
BODY MASS INDEX: 34.07 KG/M2 | WEIGHT: 238 LBS | SYSTOLIC BLOOD PRESSURE: 128 MMHG | HEIGHT: 70 IN | DIASTOLIC BLOOD PRESSURE: 70 MMHG

## 2022-03-16 DIAGNOSIS — M75.41 IMPINGEMENT SYNDROME OF RIGHT SHOULDER: ICD-10-CM

## 2022-03-16 PROCEDURE — 99213 OFFICE O/P EST LOW 20 MIN: CPT | Performed by: ORTHOPAEDIC SURGERY

## 2022-03-16 PROCEDURE — 20610 DRAIN/INJ JOINT/BURSA W/O US: CPT | Performed by: ORTHOPAEDIC SURGERY

## 2022-03-16 RX ORDER — BETAMETHASONE SODIUM PHOSPHATE AND BETAMETHASONE ACETATE 3; 3 MG/ML; MG/ML
6 INJECTION, SUSPENSION INTRA-ARTICULAR; INTRALESIONAL; INTRAMUSCULAR; SOFT TISSUE
Status: COMPLETED | OUTPATIENT
Start: 2022-03-16 | End: 2022-03-16

## 2022-03-16 RX ORDER — LIDOCAINE HYDROCHLORIDE 10 MG/ML
7 INJECTION, SOLUTION INFILTRATION; PERINEURAL
Status: COMPLETED | OUTPATIENT
Start: 2022-03-16 | End: 2022-03-16

## 2022-03-16 RX ADMIN — LIDOCAINE HYDROCHLORIDE 7 ML: 10 INJECTION, SOLUTION INFILTRATION; PERINEURAL at 13:59

## 2022-03-16 RX ADMIN — BETAMETHASONE SODIUM PHOSPHATE AND BETAMETHASONE ACETATE 6 MG: 3; 3 INJECTION, SUSPENSION INTRA-ARTICULAR; INTRALESIONAL; INTRAMUSCULAR; SOFT TISSUE at 13:59

## 2022-03-16 NOTE — ASSESSMENT & PLAN NOTE
Findings consistent with right shoulder impingement  Findings and treatment options were discussed with the patient  X-rays were reviewed with him and prognosis was discussed  Discussed that the calcification seen on x-ray does not correlate with the symptoms  Most likely symptoms are related to impingement  Recommend cortisone injection to the subacromial space today to reduce inflammation  He tolerated the procedure well  Advised to apply cold compress today  Discussed that he might see a increase in his blood sugar directly after the cortisone injection  He was prescribed formal physical therapy  Continue Tylenol as needed for pain  Follow-up in 6 weeks for re-evaluation  All patient's questions were answered to his satisfaction  This note is created using dictation transcription  It may contain typographical errors, grammatical errors, improperly dictated words, background noise and other errors

## 2022-03-16 NOTE — PROGRESS NOTES
Assessment:     1  Impingement syndrome of right shoulder        Plan:     Problem List Items Addressed This Visit        Other    Impingement syndrome of right shoulder     Findings consistent with right shoulder impingement  Findings and treatment options were discussed with the patient  X-rays were reviewed with him and prognosis was discussed  Discussed that the calcification seen on x-ray does not correlate with the symptoms  Most likely symptoms are related to impingement  Recommend cortisone injection to the subacromial space today to reduce inflammation  He tolerated the procedure well  Advised to apply cold compress today  Discussed that he might see a increase in his blood sugar directly after the cortisone injection  He was prescribed formal physical therapy  Continue Tylenol as needed for pain  Follow-up in 6 weeks for re-evaluation  All patient's questions were answered to his satisfaction  This note is created using dictation transcription  It may contain typographical errors, grammatical errors, improperly dictated words, background noise and other errors  Relevant Medications    lidocaine (XYLOCAINE) 1 % injection 7 mL (Completed)    betamethasone acetate-betamethasone sodium phosphate (CELESTONE) injection 6 mg (Completed)    Other Relevant Orders    Ambulatory Referral to Physical Therapy    Large joint arthrocentesis: R subacromial bursa (Completed)         Subjective:     Patient ID: Tammy Peter is a 77 y o  male  Chief Complaint: This is a right-hand-dominant 78-year-old white male here for evaluation of his right shoulder  January 25, 2021 he suffered a fall on ice  He landed directly on the right shoulder with his arm tucked against him  He states he felt immediate right shoulder pain at that time  He was able to lift his arm at that time  Since then he continues to pain in the right shoulder  He mostly feels it when he reaches behind him    It occasionally wakes him up at nighttime  He denies any significant weakness since the injury  No issues with overhead activities  Was recently seen by his PCP who sent him for x-rays and referred to Orthopedics  No other treatment  He denies any prior injury to that shoulder  Patient intake form reviewed  Allergy:  Allergies   Allergen Reactions    Penicillins Other (See Comments)     hives-emily cefazolin     Medications:  all current active meds have been reviewed  Past Medical History:  Past Medical History:   Diagnosis Date    Old myocardial infarction     Tear of medial meniscus of left knee, subsequent encounter     RESOLVE: [id number]     Past Surgical History:  Past Surgical History:   Procedure Laterality Date    APPENDECTOMY      CORONARY ANGIOPLASTY      CORONARY ARTERY BYPASS GRAFT      KNEE ARTHROSCOPY      KNEE SURGERY Right     TONSILLECTOMY AND ADENOIDECTOMY      UMBILICAL HERNIA REPAIR       Family History:  Family History   Adopted: Yes     Social History:  Social History     Substance and Sexual Activity   Alcohol Use Yes    Comment: rare     Social History     Substance and Sexual Activity   Drug Use Never    Comment: Occasional     Social History     Tobacco Use   Smoking Status Current Some Day Smoker    Packs/day: 0 25    Types: Cigarettes, Cigars    Start date: 5    Last attempt to quit: 10/2/2013    Years since quittin 4   Smokeless Tobacco Former User   Tobacco Comment    FORMER SMOKER AS PER ALL SCRIPTS      Review of Systems   Constitutional: Negative  HENT: Negative  Eyes: Negative  Respiratory: Negative  Cardiovascular: Negative  Gastrointestinal: Negative  Endocrine: Negative  Genitourinary: Negative  Musculoskeletal: Positive for arthralgias (Right shoulder)  Negative for neck pain  Skin: Negative  Allergic/Immunologic: Negative  Neurological: Negative  Hematological: Negative  Psychiatric/Behavioral: Negative            Objective:  BP Readings from Last 1 Encounters:   03/16/22 128/70      Wt Readings from Last 1 Encounters:   03/16/22 108 kg (238 lb)      BMI:   Estimated body mass index is 34 15 kg/m² as calculated from the following:    Height as of this encounter: 5' 10" (1 778 m)  Weight as of this encounter: 108 kg (238 lb)  BSA:   Estimated body surface area is 2 25 meters squared as calculated from the following:    Height as of this encounter: 5' 10" (1 778 m)  Weight as of this encounter: 108 kg (238 lb)  Physical Exam  Vitals and nursing note reviewed  Constitutional:       General: He is not in acute distress  Appearance: Normal appearance  He is well-developed  HENT:      Head: Normocephalic and atraumatic  Right Ear: External ear normal       Left Ear: External ear normal    Eyes:      Extraocular Movements: Extraocular movements intact  Conjunctiva/sclera: Conjunctivae normal    Pulmonary:      Effort: Pulmonary effort is normal  No respiratory distress  Musculoskeletal:      Cervical back: Neck supple  Skin:     General: Skin is warm and dry  Neurological:      Mental Status: He is alert and oriented to person, place, and time  Psychiatric:         Mood and Affect: Mood normal          Behavior: Behavior normal        Right Shoulder Exam     Tenderness   The patient is experiencing no tenderness  Range of Motion   Active abduction: normal   External rotation: normal   Forward flexion: normal   Internal rotation 0 degrees: L1 (pain)     Muscle Strength   The patient has normal right shoulder strength      Tests   Spencer test: positive  Cross arm: negative  Impingement: positive  Drop arm: negative    Other   Erythema: absent  Scars: absent  Sensation: normal  Pulse: present    Comments:  Negative speed's  Negative empty can  Pain with resisted abduction            I have personally reviewed pertinent films in PACS and my interpretation is X-rays of the right shoulder reveal small calcification the lesser tuberosity proximal humerus  No other bony abnormalities  Large joint arthrocentesis: R subacromial bursa  Universal Protocol:  Consent: Verbal consent obtained    Risks and benefits: risks, benefits and alternatives were discussed  Consent given by: patient  Patient understanding: patient states understanding of the procedure being performed    Supporting Documentation  Indications: pain   Procedure Details  Location: shoulder - R subacromial bursa  Preparation: Patient was prepped and draped in the usual sterile fashion  Needle size: 22 G  Approach: posterior  Medications administered: 7 mL lidocaine 1 %; 6 mg betamethasone acetate-betamethasone sodium phosphate 6 (3-3) mg/mL    Patient tolerance: patient tolerated the procedure well with no immediate complications  Dressing:  Sterile dressing applied

## 2022-03-17 ENCOUNTER — PATIENT MESSAGE (OUTPATIENT)
Dept: PAIN MEDICINE | Facility: CLINIC | Age: 67
End: 2022-03-17

## 2022-03-17 NOTE — TELEPHONE ENCOUNTER
From: Terence Odom  To: HEMANTH Ocampo Ala  Sent: 3/17/2022 6:42 AM EDT  Subject: medicine increase    Good morning Ramón,  I am wondering if you can increase the meds to 3 x a day  I have been taking Gabapentin 3x a day on occasion rather than 2x as prescribed  This has completely eliminated any nighttime pain that I was having on occasion  Could you change my script to 3 x a day rather than 2   Thank you [facility]

## 2022-03-24 ENCOUNTER — EVALUATION (OUTPATIENT)
Dept: PHYSICAL THERAPY | Facility: CLINIC | Age: 67
End: 2022-03-24
Payer: COMMERCIAL

## 2022-03-24 DIAGNOSIS — G89.29 CHRONIC RIGHT SHOULDER PAIN: Primary | ICD-10-CM

## 2022-03-24 DIAGNOSIS — M75.41 IMPINGEMENT SYNDROME OF RIGHT SHOULDER: ICD-10-CM

## 2022-03-24 DIAGNOSIS — M25.511 CHRONIC RIGHT SHOULDER PAIN: Primary | ICD-10-CM

## 2022-03-24 PROCEDURE — 97161 PT EVAL LOW COMPLEX 20 MIN: CPT | Performed by: PHYSICAL THERAPIST

## 2022-03-24 NOTE — PROGRESS NOTES
PT Evaluation     Today's date: 3/24/2022  Patient name: Clara Evans  : 1955  MRN: [id number]  Referring provider: Laxmi Streeter PA-C  Dx:   Encounter Diagnosis     ICD-10-CM    1  Impingement syndrome of right shoulder  M75 41 Ambulatory Referral to Physical Therapy                  Assessment  Assessment details: Clara Evans is a 77 y o  male presenting to outpatient physical therapy at [address] [place] [address] with complaints of right shoulder pain and stiffness   He presents with decreased right shoulder range of motion, decreased strength, limited flexibility, poor postural awareness, poor body mechanics, poor balance, decreased tolerance to activity and decreased functional mobility due to Impingement syndrome of right shoulder   He would benefit from skilled PT services in order to address these deficits and reach maximum level of function   Thank you for the referral!    Impairments: abnormal or restricted ROM, activity intolerance, impaired physical strength and pain with function  Understanding of Dx/Px/POC: good   Prognosis: good    Goals  STG  1  Independent with HEP in 2 weeks  2  Decrease pain at worst by 50% in 2 weeks    LTG  1  Increase right shoulder strength in all planes to 5/5 in 4 weeks  2  Return to full, unrestricted household chores in 4 weeks      Plan  Patient would benefit from: skilled physical therapy  Planned modality interventions: thermotherapy: hydrocollator packs  Planned therapy interventions: therapeutic exercise, neuromuscular re-education, manual therapy and home exercise program  Frequency: 1x week  Duration in weeks: 4  Treatment plan discussed with: patient        Subjective Evaluation    History of Present Illness  Mechanism of injury: Pt reports history of right shoulder pain secondary to slip and fall accident ~ 1 year ago     Pain  Current pain ratin  At best pain ratin  At worst pain ratin  Location: Right superolateral region   Quality: sharp  Relieving factors: change in position and ice  Progression: improved      Diagnostic Tests  X-ray: abnormal    FCE comments: Calcific tendonitis demonstrated R shoulderTreatments  Previous treatment: injection treatment  Patient Goals  Patient goals for therapy: decreased pain and increased motion  Patient goal: Reach behind and out to side without pain        Objective     Active Range of Motion   Left Shoulder   Normal active range of motion    Right Shoulder   Flexion: 170 degrees   Extension: 30 degrees   External rotation 0°: 60 degrees with pain    Additional Active Range of Motion Details  L IR = T4  R IR = T12        Strength/Myotome Testing     Left Shoulder   Normal muscle strength    Right Shoulder     Planes of Motion   Flexion: 4+   Extension: 4+   Abduction: 4+   External rotation at 0°: 4+   Internal rotation at 0°: 4     Isolated Muscles   Lower trapezius: 4+   Middle trapezius: 4+   Supraspinatus: 4+   Triceps: 5   Upper trapezius: 5     Tests     Right Shoulder   Positive crossover, empty can, Hawkin's and Miladis's  Negative AC shear, full can, Neer's, Speed's and posterior apprehension                 EPOC: 4/22/22      Manuals 3/24            R shoulder PROM all ranges NS            IASTM post shoulder                                       Neuro Re-Ed             scap retraction iso 10x8"            ER iso 10x8"                                                                Pathoanatomy, nature of sxs, POC, HEP NS            Ther Ex             UE Ergo                                                                                                        Ther Activity                                       Gait Training                                       Modalities

## 2022-03-31 ENCOUNTER — TELEPHONE (OUTPATIENT)
Dept: OBGYN CLINIC | Facility: HOSPITAL | Age: 67
End: 2022-03-31

## 2022-03-31 NOTE — TELEPHONE ENCOUNTER
Patient calling in to cancel his physical therapy session for tomorrow  Call transferred to appropriate physical therapy office

## 2022-04-01 ENCOUNTER — APPOINTMENT (OUTPATIENT)
Dept: PHYSICAL THERAPY | Facility: CLINIC | Age: 67
End: 2022-04-01
Payer: COMMERCIAL

## 2022-04-01 DIAGNOSIS — R52 PAIN: ICD-10-CM

## 2022-04-01 DIAGNOSIS — M54.50 LOW BACK PAIN WITHOUT SCIATICA, UNSPECIFIED BACK PAIN LATERALITY, UNSPECIFIED CHRONICITY: ICD-10-CM

## 2022-04-01 RX ORDER — METHOCARBAMOL 750 MG/1
750 TABLET, FILM COATED ORAL EVERY 6 HOURS PRN
Qty: 30 TABLET | Refills: 0 | Status: SHIPPED | OUTPATIENT
Start: 2022-04-01 | End: 2022-05-12 | Stop reason: SDUPTHER

## 2022-04-01 RX ORDER — HYDROCODONE BITARTRATE AND ACETAMINOPHEN 5; 325 MG/1; MG/1
1 TABLET ORAL EVERY 6 HOURS PRN
Qty: 30 TABLET | Refills: 0 | Status: SHIPPED | OUTPATIENT
Start: 2022-04-01 | End: 2022-05-12 | Stop reason: SDUPTHER

## 2022-04-08 ENCOUNTER — OFFICE VISIT (OUTPATIENT)
Dept: PHYSICAL THERAPY | Facility: CLINIC | Age: 67
End: 2022-04-08
Payer: COMMERCIAL

## 2022-04-08 DIAGNOSIS — M75.41 IMPINGEMENT SYNDROME OF RIGHT SHOULDER: Primary | ICD-10-CM

## 2022-04-08 DIAGNOSIS — G89.29 CHRONIC RIGHT SHOULDER PAIN: ICD-10-CM

## 2022-04-08 DIAGNOSIS — M25.511 CHRONIC RIGHT SHOULDER PAIN: ICD-10-CM

## 2022-04-08 PROCEDURE — 97140 MANUAL THERAPY 1/> REGIONS: CPT | Performed by: PHYSICAL THERAPIST

## 2022-04-08 PROCEDURE — 97112 NEUROMUSCULAR REEDUCATION: CPT | Performed by: PHYSICAL THERAPIST

## 2022-04-08 NOTE — PROGRESS NOTES
Daily Note     Today's date: 2022  Patient name: Georgiana Lackey  : 1955  MRN: [id number]  Referring provider: Emma Gonzalez PA-C  Dx:   Encounter Diagnosis     ICD-10-CM    1  Impingement syndrome of right shoulder  M75 41    2  Chronic right shoulder pain  M25 511     G89 29                   Subjective: Compliant with HEP, no questions regarding POC, motivated to continue PT      Objective: See treatment diary below      Assessment: Tolerated treatment well with initiation of full treatment program today as noted below with minimal inc in sxs  Verbal and tactile cues required for safe execution of therapeutic exercises  Added ER iso to HEP, see handout  Patient demonstrated fatigue post treatment and would benefit from continued PT      Plan: Progress treatment as tolerated  Assess HEP technique & effects of implementation        EPOC: 22      Manuals 3/24 4/8           R shoulder PROM all ranges NS NS           IASTM post shoulder  NS                                     Neuro Re-Ed             scap retraction iso 10x8"            ER iso 10x8" 3x10  GTB           Mid Row iso  3x10  GTB           Shld Ext iso  3x10  GTB                                     Pathoanatomy, nature of sxs, POC, HEP NS NS           Ther Ex             UE Ergo                                                                                                        Ther Activity                                       Gait Training                                       Modalities

## 2022-04-15 ENCOUNTER — APPOINTMENT (OUTPATIENT)
Dept: PHYSICAL THERAPY | Facility: CLINIC | Age: 67
End: 2022-04-15
Payer: COMMERCIAL

## 2022-04-22 ENCOUNTER — OFFICE VISIT (OUTPATIENT)
Dept: PHYSICAL THERAPY | Facility: CLINIC | Age: 67
End: 2022-04-22
Payer: COMMERCIAL

## 2022-04-22 DIAGNOSIS — M75.41 IMPINGEMENT SYNDROME OF RIGHT SHOULDER: Primary | ICD-10-CM

## 2022-04-22 DIAGNOSIS — G89.29 CHRONIC RIGHT SHOULDER PAIN: ICD-10-CM

## 2022-04-22 DIAGNOSIS — M25.511 CHRONIC RIGHT SHOULDER PAIN: ICD-10-CM

## 2022-04-22 PROCEDURE — 97110 THERAPEUTIC EXERCISES: CPT | Performed by: PHYSICAL THERAPIST

## 2022-04-22 PROCEDURE — 97112 NEUROMUSCULAR REEDUCATION: CPT | Performed by: PHYSICAL THERAPIST

## 2022-04-22 NOTE — PROGRESS NOTES
Daily Note     Today's date: 2022  Patient name: Drew Joseph  : 1955  MRN: [id number]  Referring provider: Leno Ramirez PA-C  Dx:   Encounter Diagnosis     ICD-10-CM    1  Impingement syndrome of right shoulder  M75 41    2  Chronic right shoulder pain  M25 511     G89 29        Subjective: Compliant with HEP, no questions regarding POC, motivated to continue PT      Objective: See treatment diary below      Assessment: Tolerated treatment well with progression of treatment program today as noted below with minimal inc in sxs  Verbal and tactile cues required for safe execution of therapeutic exercises  Patient demonstrated fatigue post treatment and would benefit from continued PT      Plan: Progress treatment as tolerated          EPOC: 22      Manuals 3/24 4/8 4/22          R shoulder PROM all ranges NS NS           IASTM post shoulder  NS                                     Neuro Re-Ed             scap retraction iso 10x8"            ER iso 10x8" 3x10  GTB 3x10 BTB          Mid Row iso  3x10  GTB 3x8 55lb CC          Shld Ext iso  3x10  GTB 3x10 BTB                                    Pathoanatomy, nature of sxs, POC, HEP NS NS NS          Ther Ex             UE Ergo   2'/2' fwd/bwd          Wand Ext   10x10"          Wand IR   10x10"                                                                           Ther Activity                                       Gait Training                                       Modalities

## 2022-04-29 ENCOUNTER — APPOINTMENT (OUTPATIENT)
Dept: PHYSICAL THERAPY | Facility: CLINIC | Age: 67
End: 2022-04-29
Payer: COMMERCIAL

## 2022-05-05 ENCOUNTER — OFFICE VISIT (OUTPATIENT)
Dept: OBGYN CLINIC | Facility: CLINIC | Age: 67
End: 2022-05-05
Payer: COMMERCIAL

## 2022-05-05 VITALS
WEIGHT: 235 LBS | DIASTOLIC BLOOD PRESSURE: 78 MMHG | SYSTOLIC BLOOD PRESSURE: 120 MMHG | BODY MASS INDEX: 33.64 KG/M2 | HEIGHT: 70 IN

## 2022-05-05 DIAGNOSIS — M75.41 IMPINGEMENT SYNDROME OF RIGHT SHOULDER: Primary | ICD-10-CM

## 2022-05-05 PROCEDURE — 99213 OFFICE O/P EST LOW 20 MIN: CPT | Performed by: ORTHOPAEDIC SURGERY

## 2022-05-05 NOTE — ASSESSMENT & PLAN NOTE
Findings today are consistent with right shoulder impingement syndrome  Imaging and prognosis was reviewed with patient today  Patient overall has seen improvements  Discussed with patient that his shoulder condition can take 3-4 months to see full relief  Patient requested another CSI today- discussed with patient that it was too soon for another on and should be on a as needed basis  Discussed briefly with patient the surgical option to relief this pain, but feel he is not a candidate at this time because he has seen improvements with conservative treatments at this time- patient agreed as well  At this time patient should continue physical therapy and HEP  Patient should avoid activities that aggravate his symptoms  Patient will follow up in 2 months  All patient's questions were answered to his satisfaction  This note is created using dictation transcription  It may contain typographical errors, grammatical errors, improperly dictated words, background noise and other errors

## 2022-05-05 NOTE — PROGRESS NOTES
Assessment:     1  Impingement syndrome of right shoulder        Plan:     Problem List Items Addressed This Visit        Musculoskeletal and Integument    Impingement syndrome of right shoulder - Primary     Findings today are consistent with right shoulder impingement syndrome  Imaging and prognosis was reviewed with patient today  Patient overall has seen improvements  Discussed with patient that his shoulder condition can take 3-4 months to see full relief  Patient requested another CSI today- discussed with patient that it was too soon for another on and should be on a as needed basis  Discussed briefly with patient the surgical option to relief this pain, but feel he is not a candidate at this time because he has seen improvements with conservative treatments at this time- patient agreed as well  At this time patient should continue physical therapy and HEP  Patient should avoid activities that aggravate his symptoms  Patient will follow up in 2 months  All patient's questions were answered to his satisfaction  This note is created using dictation transcription  It may contain typographical errors, grammatical errors, improperly dictated words, background noise and other errors  Subjective:     Patient ID: Jonathan Ayers is a 77 y o  male  Chief Complaint:  Patient presents today for a 6 week follow up from right shoulder pain  Patient received a CSI of the right shoulder at his last visit on 3/16/2022  Patient states he has received about 50% relief  Patient states that over all he has seen improvements with his pain and activity  Pain is located over lateral shoulder and is intermittent  Pain occurs with shoulder extension and external rotation  He describes the pain as sharp/burning  Patient has been going to physical therapy every other week- states the cost of a session does not allow him to go more frequently  Patient has been completing his HEP   Patient does not take anything for pain at this time  He is able to sleep on the right side now  Allergy:  Allergies   Allergen Reactions    Penicillins Other (See Comments)     hives-emily cefazolin     Medications:  all current active meds have been reviewed  Past Medical History:  Past Medical History:   Diagnosis Date    Old myocardial infarction     Tear of medial meniscus of left knee, subsequent encounter     RESOLVE: [id number]     Past Surgical History:  Past Surgical History:   Procedure Laterality Date    APPENDECTOMY      CORONARY ANGIOPLASTY      CORONARY ARTERY BYPASS GRAFT      KNEE ARTHROSCOPY      KNEE SURGERY Right     TONSILLECTOMY AND ADENOIDECTOMY      UMBILICAL HERNIA REPAIR       Family History:  Family History   Adopted: Yes     Social History:  Social History     Substance and Sexual Activity   Alcohol Use Yes    Comment: rare     Social History     Substance and Sexual Activity   Drug Use Never    Comment: Occasional     Social History     Tobacco Use   Smoking Status Current Some Day Smoker    Packs/day: 0 25    Types: Cigarettes, Cigars    Start date: 5    Last attempt to quit: 10/2/2013    Years since quittin 5   Smokeless Tobacco Former User   Tobacco Comment    FORMER SMOKER AS PER ALL SCRIPTS      Review of Systems   Constitutional: Negative for chills and fever  HENT: Negative for ear pain and sore throat  Eyes: Negative for pain and visual disturbance  Respiratory: Negative for cough and shortness of breath  Cardiovascular: Negative for chest pain and palpitations  Gastrointestinal: Negative for abdominal pain and vomiting  Genitourinary: Negative for dysuria and hematuria  Musculoskeletal: Positive for arthralgias (right shoulder)  Negative for back pain and neck pain  Skin: Negative for color change and rash  Neurological: Negative for seizures and syncope  Psychiatric/Behavioral: Negative  All other systems reviewed and are negative          Objective:  BP Readings from Last 1 Encounters:   05/05/22 120/78      Wt Readings from Last 1 Encounters:   05/05/22 107 kg (235 lb)      BMI:   Estimated body mass index is 33 72 kg/m² as calculated from the following:    Height as of this encounter: 5' 10" (1 778 m)  Weight as of this encounter: 107 kg (235 lb)  BSA:   Estimated body surface area is 2 24 meters squared as calculated from the following:    Height as of this encounter: 5' 10" (1 778 m)  Weight as of this encounter: 107 kg (235 lb)  Physical Exam  Vitals and nursing note reviewed  Constitutional:       Appearance: Normal appearance  He is well-developed  He is obese  HENT:      Head: Normocephalic and atraumatic  Right Ear: External ear normal       Left Ear: External ear normal    Eyes:      Extraocular Movements: Extraocular movements intact  Conjunctiva/sclera: Conjunctivae normal    Pulmonary:      Effort: Pulmonary effort is normal    Musculoskeletal:      Cervical back: Neck supple  Skin:     General: Skin is warm and dry  Neurological:      Mental Status: He is alert and oriented to person, place, and time  Psychiatric:         Mood and Affect: Mood normal          Behavior: Behavior normal        Right Shoulder Exam     Tenderness   The patient is experiencing no tenderness  Range of Motion   Active abduction: normal   Extension: normal   External rotation: normal   Forward flexion: normal   Internal rotation 0 degrees: normal     Muscle Strength   The patient has normal right shoulder strength      Tests   Spencer test: positive  Cross arm: negative  Impingement: positive  Drop arm: negative    Other   Erythema: absent  Scars: absent  Sensation: normal  Pulse: present            No new images for review today     Scribe Attestation    I,:  Safia Whitfield am acting as a scribe while in the presence of the attending physician :       I,:  Shanique Morris MD personally performed the services described in this documentation    as scribed in my presence :

## 2022-05-12 DIAGNOSIS — M54.50 LOW BACK PAIN WITHOUT SCIATICA, UNSPECIFIED BACK PAIN LATERALITY, UNSPECIFIED CHRONICITY: ICD-10-CM

## 2022-05-12 DIAGNOSIS — R52 PAIN: ICD-10-CM

## 2022-05-12 RX ORDER — HYDROCODONE BITARTRATE AND ACETAMINOPHEN 5; 325 MG/1; MG/1
1 TABLET ORAL EVERY 6 HOURS PRN
Qty: 30 TABLET | Refills: 0 | Status: SHIPPED | OUTPATIENT
Start: 2022-05-12 | End: 2022-06-09 | Stop reason: SDUPTHER

## 2022-05-12 RX ORDER — METHOCARBAMOL 750 MG/1
750 TABLET, FILM COATED ORAL EVERY 6 HOURS PRN
Qty: 30 TABLET | Refills: 0 | Status: SHIPPED | OUTPATIENT
Start: 2022-05-12 | End: 2022-06-09 | Stop reason: SDUPTHER

## 2022-06-09 DIAGNOSIS — R52 PAIN: ICD-10-CM

## 2022-06-09 DIAGNOSIS — M54.50 LOW BACK PAIN WITHOUT SCIATICA, UNSPECIFIED BACK PAIN LATERALITY, UNSPECIFIED CHRONICITY: ICD-10-CM

## 2022-06-09 RX ORDER — METHOCARBAMOL 750 MG/1
750 TABLET, FILM COATED ORAL EVERY 6 HOURS PRN
Qty: 30 TABLET | Refills: 0 | Status: SHIPPED | OUTPATIENT
Start: 2022-06-09 | End: 2022-07-05 | Stop reason: SDUPTHER

## 2022-06-09 RX ORDER — HYDROCODONE BITARTRATE AND ACETAMINOPHEN 5; 325 MG/1; MG/1
1 TABLET ORAL EVERY 6 HOURS PRN
Qty: 30 TABLET | Refills: 0 | Status: SHIPPED | OUTPATIENT
Start: 2022-06-09 | End: 2022-07-25 | Stop reason: SDUPTHER

## 2022-06-17 ENCOUNTER — EVALUATION (OUTPATIENT)
Dept: PHYSICAL THERAPY | Facility: CLINIC | Age: 67
End: 2022-06-17
Payer: COMMERCIAL

## 2022-06-17 DIAGNOSIS — G89.29 CHRONIC RIGHT SHOULDER PAIN: Primary | ICD-10-CM

## 2022-06-17 DIAGNOSIS — M25.511 CHRONIC RIGHT SHOULDER PAIN: Primary | ICD-10-CM

## 2022-06-17 PROCEDURE — 97110 THERAPEUTIC EXERCISES: CPT | Performed by: PHYSICAL THERAPIST

## 2022-06-17 PROCEDURE — 97140 MANUAL THERAPY 1/> REGIONS: CPT | Performed by: PHYSICAL THERAPIST

## 2022-06-17 PROCEDURE — 97112 NEUROMUSCULAR REEDUCATION: CPT | Performed by: PHYSICAL THERAPIST

## 2022-06-17 NOTE — PROGRESS NOTES
PT Re-Evaluation     Today's date: 2022  Patient name: Shar Tamayo  : 1955  MRN: [id number]  Referring provider: Terry Ayers PA-C  Dx:   Encounter Diagnosis     ICD-10-CM    1  Chronic right shoulder pain  M25 511     G89 29                   Assessment  Assessment details: Shar Tamayo is a 77 y o  male presenting to outpatient physical therapy at [place] [address] with complaints of right shoulder pain and stiffness   He presents with decreased right shoulder range of motion, decreased strength, limited flexibility, poor postural awareness, poor body mechanics, poor balance, decreased tolerance to activity and decreased functional mobility due to Impingement syndrome of right shoulder   He would benefit from skilled PT services in order to address these deficits and reach maximum level of function   Thank you for the referral!    Impairments: abnormal or restricted ROM, activity intolerance, impaired physical strength and pain with function  Understanding of Dx/Px/POC: good   Prognosis: good    Goals  STG  1  Independent with HEP in 2 weeks  2  Decrease pain at worst by 50% in 2 weeks    LTG  1  Increase right shoulder strength in all planes to 5/5 in 4 weeks  2  Return to full, unrestricted household chores in 4 weeks      Plan  Patient would benefit from: skilled physical therapy  Planned modality interventions: thermotherapy: hydrocollator packs  Planned therapy interventions: therapeutic exercise, neuromuscular re-education, manual therapy and home exercise program  Frequency: 1x week  Duration in weeks: 6  Plan of Care beginning date: 2022  Plan of Care expiration date: 2022  Treatment plan discussed with: patient        Subjective Evaluation    History of Present Illness  Mechanism of injury: Pt reports history of right shoulder pain secondary to slip and fall accident ~ 1 year ago   Re-Eval: Patient returns to physical therapy after 2 months away     He mentions things were going well until he started cutting firewood about 3 weeks ago  He mentions he feels tightness in the R biceps  He has no difficulty completing ADLs  Some difficulty and discomfort while completing household chores  He describes the action that causes him the most pain is a combination of horizontal abduction and extension  Follows up with doctor in 2 weeks 22  Pain  Current pain ratin  At best pain ratin  At worst pain rating: 10  Location: Right superolateral region   Quality: sharp  Relieving factors: change in position and ice  Progression: improved      Diagnostic Tests  X-ray: abnormal    FCE comments: Calcific tendonitis demonstrated R shoulderTreatments  Previous treatment: injection treatment  Patient Goals  Patient goals for therapy: decreased pain and increased motion  Patient goal: Reach behind and out to side without pain      Objective     Right Shoulder Active Range of Motion   Flexion: 145 degrees  Abduction: 130 with pain   IR: L5 with pain  ER C3       Right Shoulder Passive Range of Motion  Flexion: WFL  Abduction: WFL  IR at 45* abd: 60 degrees  ER at 45* abd: 60 degrees          Strength/Myotome Testing     Left Shoulder   Normal muscle strength    Right Shoulder   Flexion: 4+   Extension: 4+   Abduction: 4- with pain   External rotation at 0°: 4+   Internal rotation at 0°: 4+     Isolated Muscles   Lower trapezius: 4+   Middle trapezius: 4+   Supraspinatus: 4+   Triceps: 5   Biceps: 4+  Upper trapezius: 5     Tests     Right Shoulder   Positive crossover, empty can, Hawkin's and Miladis's  Negative AC shear, full can, Neer's, Speed's and posterior apprehension                  Precautions: standard   End POC: 2022    Manuals             R shoulder PROM              IASTM post shoulder                          Re-Evaluation SK            Neuro Re-Ed             scap retraction  5"x10            Rows GTB 2x10            Extension GTB 2x10            ER GTB 2x10            IR GTB 2x10                                      Ther Ex             UBE 2'/2'            Strap IR Stretch 10"x10            Gentle Biceps Doorway Stretch R 15"x5                                                                             Ther Activity                                       Gait Training                                       Modalities

## 2022-06-21 DIAGNOSIS — E11.9 CONTROLLED TYPE 2 DIABETES MELLITUS WITHOUT COMPLICATION, WITHOUT LONG-TERM CURRENT USE OF INSULIN (HCC): ICD-10-CM

## 2022-06-21 RX ORDER — SAXAGLIPTIN AND METFORMIN HYDROCHLORIDE 5; 1000 MG/1; MG/1
1 TABLET, FILM COATED, EXTENDED RELEASE ORAL DAILY
Qty: 90 TABLET | Refills: 1 | Status: SHIPPED | OUTPATIENT
Start: 2022-06-21 | End: 2022-07-11 | Stop reason: SDUPTHER

## 2022-06-21 NOTE — TELEPHONE ENCOUNTER
Kombiglyze XR 5-1000 MG TB24 1 tablet, Daily 1 ordered    90 tablets    Patient called  He needs his Rx faxed to [facility]   He said we can't electronically send it, he said it needs to be faxed  Fax # [phone] or [phone]  Thank you  No

## 2022-06-30 ENCOUNTER — OFFICE VISIT (OUTPATIENT)
Dept: OBGYN CLINIC | Facility: CLINIC | Age: 67
End: 2022-06-30
Payer: COMMERCIAL

## 2022-06-30 VITALS
BODY MASS INDEX: 33.79 KG/M2 | DIASTOLIC BLOOD PRESSURE: 80 MMHG | HEIGHT: 70 IN | SYSTOLIC BLOOD PRESSURE: 130 MMHG | WEIGHT: 236 LBS

## 2022-06-30 DIAGNOSIS — M75.41 IMPINGEMENT SYNDROME OF RIGHT SHOULDER: Primary | ICD-10-CM

## 2022-06-30 PROCEDURE — 20610 DRAIN/INJ JOINT/BURSA W/O US: CPT | Performed by: ORTHOPAEDIC SURGERY

## 2022-06-30 PROCEDURE — 99213 OFFICE O/P EST LOW 20 MIN: CPT | Performed by: ORTHOPAEDIC SURGERY

## 2022-06-30 RX ORDER — LIDOCAINE HYDROCHLORIDE 10 MG/ML
5 INJECTION, SOLUTION INFILTRATION; PERINEURAL
Status: COMPLETED | OUTPATIENT
Start: 2022-06-30 | End: 2022-06-30

## 2022-06-30 RX ORDER — BETAMETHASONE SODIUM PHOSPHATE AND BETAMETHASONE ACETATE 3; 3 MG/ML; MG/ML
6 INJECTION, SUSPENSION INTRA-ARTICULAR; INTRALESIONAL; INTRAMUSCULAR; SOFT TISSUE
Status: COMPLETED | OUTPATIENT
Start: 2022-06-30 | End: 2022-06-30

## 2022-06-30 RX ADMIN — LIDOCAINE HYDROCHLORIDE 5 ML: 10 INJECTION, SOLUTION INFILTRATION; PERINEURAL at 13:11

## 2022-06-30 RX ADMIN — BETAMETHASONE SODIUM PHOSPHATE AND BETAMETHASONE ACETATE 6 MG: 3; 3 INJECTION, SUSPENSION INTRA-ARTICULAR; INTRALESIONAL; INTRAMUSCULAR; SOFT TISSUE at 13:11

## 2022-06-30 NOTE — PROGRESS NOTES
Assessment:     1  Impingement syndrome of right shoulder        Plan:     Problem List Items Addressed This Visit        Musculoskeletal and Integument    Impingement syndrome of right shoulder - Primary     Findings consistent with right shoulder impingement syndrome  Patient was given repeat CSI today, tolerated well, cold compress today  Discussed with patient physical therapy and HEP are main cure for patients symptoms not repeated cortisone injections  Avoid repetitive overhead activities, heavy lifting or carrying with right arm  OTC anti inflammatories as needed for pain  See patient back in 8 weeks  All patient's questions were answered to his satisfaction  This note is created using dictation transcription  It may contain typographical errors, grammatical errors, improperly dictated words, background noise and other errors  Relevant Medications    lidocaine (XYLOCAINE) 1 % injection 5 mL (Completed)    betamethasone acetate-betamethasone sodium phosphate (CELESTONE) injection 6 mg (Completed)    Other Relevant Orders    Large joint arthrocentesis: R subacromial bursa (Completed)          Subjective:     Patient ID: Tonya Torres is a 77 y o  male  Chief Complaint:  Patient presents for follow up from right shoulder pain  Patient received a CSI of the right shoulder on 3/16/2022 with 50% relief  Seen in may and pain was intermittent  He has been attending physical therapy intermittently due to financial hardship  Maintaining HEP  Therapy is helpful  He still has pain at and above shoulder level and maintains shoulder motion  Denies any numbness or tingling in arm  He is diabetic       Allergy:  Allergies   Allergen Reactions    Penicillins Other (See Comments)     hives-emily cefazolin     Medications:  all current active meds have been reviewed  Past Medical History:  Past Medical History:   Diagnosis Date    Old myocardial infarction     Tear of medial meniscus of left knee, subsequent encounter     RESOLVE: [id number]     Past Surgical History:  Past Surgical History:   Procedure Laterality Date    APPENDECTOMY      CORONARY ANGIOPLASTY      CORONARY ARTERY BYPASS GRAFT      KNEE ARTHROSCOPY      KNEE SURGERY Right     TONSILLECTOMY AND ADENOIDECTOMY      UMBILICAL HERNIA REPAIR       Family History:  Family History   Adopted: Yes     Social History:  Social History     Substance and Sexual Activity   Alcohol Use Yes    Comment: rare     Social History     Substance and Sexual Activity   Drug Use Never    Comment: Occasional     Social History     Tobacco Use   Smoking Status Current Some Day Smoker    Packs/day: 0 25    Types: Cigarettes, Cigars    Start date: 5    Last attempt to quit: 10/2/2013    Years since quittin 7   Smokeless Tobacco Former User   Tobacco Comment    FORMER SMOKER AS PER ALL SCRIPTS      Review of Systems   Constitutional: Negative for chills and fever  HENT: Negative for ear pain and sore throat  Eyes: Negative for pain and visual disturbance  Respiratory: Negative for cough and shortness of breath  Cardiovascular: Negative for chest pain and palpitations  Gastrointestinal: Negative for abdominal pain and vomiting  Genitourinary: Negative for dysuria and hematuria  Musculoskeletal: Positive for arthralgias (Right shoulder)  Negative for back pain and neck pain  Skin: Negative for color change and rash  Neurological: Negative for seizures and syncope  Psychiatric/Behavioral: Negative  All other systems reviewed and are negative  Objective:  BP Readings from Last 1 Encounters:   22 130/80      Wt Readings from Last 1 Encounters:   22 107 kg (236 lb)      BMI:   Estimated body mass index is 33 86 kg/m² as calculated from the following:    Height as of this encounter: 5' 10" (1 778 m)  Weight as of this encounter: 107 kg (236 lb)    BSA:   Estimated body surface area is 2 24 meters squared as calculated from the following:    Height as of this encounter: 5' 10" (1 778 m)  Weight as of this encounter: 107 kg (236 lb)  Physical Exam  Vitals and nursing note reviewed  Constitutional:       Appearance: Normal appearance  He is well-developed  HENT:      Head: Normocephalic and atraumatic  Right Ear: External ear normal       Left Ear: External ear normal    Eyes:      Extraocular Movements: Extraocular movements intact  Conjunctiva/sclera: Conjunctivae normal    Pulmonary:      Effort: Pulmonary effort is normal    Musculoskeletal:         General: Tenderness (right shoulder arthralgia ) present  Cervical back: Neck supple  Skin:     General: Skin is warm and dry  Neurological:      Mental Status: He is alert and oriented to person, place, and time  Deep Tendon Reflexes: Reflexes are normal and symmetric  Psychiatric:         Mood and Affect: Mood normal          Behavior: Behavior normal        Right Shoulder Exam     Tenderness   The patient is experiencing no tenderness  Range of Motion   Active abduction: normal Right shoulder active abduction: pain  Passive abduction: normal   Forward flexion: normal Right shoulder forward flexion: pain    Internal rotation 0 degrees: T12     Muscle Strength   Abduction: 5/5   Internal rotation: 5/5   External rotation: 5/5   Biceps: 5/5     Tests   Cross arm: negative  Impingement: positive  Drop arm: negative    Other   Erythema: absent  Scars: absent  Sensation: normal  Pulse: present            no new imaging      Large joint arthrocentesis: R subacromial bursa  Universal Protocol:  Consent: Verbal consent obtained    Risks and benefits: risks, benefits and alternatives were discussed  Consent given by: patient  Patient understanding: patient states understanding of the procedure being performed  Site marked: the operative site was marked  Patient identity confirmed: verbally with patient    Supporting Documentation  Indications: pain   Procedure Details  Location: shoulder - R subacromial bursa  Preparation: Patient was prepped and draped in the usual sterile fashion  Needle size: 22 G  Ultrasound guidance: no  Approach: posterolateral  Medications administered: 5 mL lidocaine 1 %; 6 mg betamethasone acetate-betamethasone sodium phosphate 6 (3-3) mg/mL    Patient tolerance: patient tolerated the procedure well with no immediate complications  Dressing:  Sterile dressing applied        Scribe Attestation    I,:  Loren Wild am acting as a scribe while in the presence of the attending physician :       I,:  Marly Hernandez MD personally performed the services described in this documentation    as scribed in my presence :

## 2022-06-30 NOTE — ASSESSMENT & PLAN NOTE
Findings consistent with right shoulder impingement syndrome  Patient was given repeat CSI today, tolerated well, cold compress today  Discussed with patient physical therapy and HEP are main cure for patients symptoms not repeated cortisone injections  Avoid repetitive overhead activities, heavy lifting or carrying with right arm  OTC anti inflammatories as needed for pain  See patient back in 8 weeks  All patient's questions were answered to his satisfaction  This note is created using dictation transcription  It may contain typographical errors, grammatical errors, improperly dictated words, background noise and other errors

## 2022-07-01 ENCOUNTER — OFFICE VISIT (OUTPATIENT)
Dept: PHYSICAL THERAPY | Facility: CLINIC | Age: 67
End: 2022-07-01
Payer: COMMERCIAL

## 2022-07-01 DIAGNOSIS — M25.511 CHRONIC RIGHT SHOULDER PAIN: Primary | ICD-10-CM

## 2022-07-01 DIAGNOSIS — G89.29 CHRONIC RIGHT SHOULDER PAIN: Primary | ICD-10-CM

## 2022-07-01 PROCEDURE — 97112 NEUROMUSCULAR REEDUCATION: CPT | Performed by: PHYSICAL THERAPIST

## 2022-07-01 PROCEDURE — 97110 THERAPEUTIC EXERCISES: CPT | Performed by: PHYSICAL THERAPIST

## 2022-07-01 NOTE — PROGRESS NOTES
Daily Note     Today's date: 2022  Patient name: Yu Thorpe  : 1955  MRN: [id number]  Referring provider: Marino Juárez PA-C  Dx:   Encounter Diagnosis     ICD-10-CM    1  Chronic right shoulder pain  M25 511     G89 29                   Subjective: Shoulder feeling better, happy with progress in PT and felt really good after last session       Objective: See treatment diary below      Assessment: Tolerated treatment well  Pt reports improved sxs from baseline following TE today  Patient demonstrated fatigue post treatment, exhibited good technique with therapeutic exercises and would benefit from continued PT      Plan: Progress treatment as tolerated           Precautions: standard     End POC: 2022    Manuals            R shoulder PROM              IASTM post shoulder                          Re-Evaluation SK            Neuro Re-Ed             scap retraction  5"x10 5" x20           Rows GTB 2x10 GTB 3X10           Extension GTB 2x10 GTB 3x10           ER GTB 2x10 GTB 3x10           IR GTB 2x10 GTB 3x10                                     Ther Ex             UBE 2'/2' 4'/4'           Strap IR Stretch 10"x10 10x10"           Gentle Biceps Doorway Stretch R 15"x5 15"x5                                                                            Ther Activity                                       Gait Training                                       Modalities

## 2022-07-05 DIAGNOSIS — R52 PAIN: ICD-10-CM

## 2022-07-05 RX ORDER — METHOCARBAMOL 750 MG/1
750 TABLET, FILM COATED ORAL EVERY 6 HOURS PRN
Qty: 30 TABLET | Refills: 0 | Status: SHIPPED | OUTPATIENT
Start: 2022-07-05 | End: 2022-07-25 | Stop reason: SDUPTHER

## 2022-07-08 ENCOUNTER — OFFICE VISIT (OUTPATIENT)
Dept: PHYSICAL THERAPY | Facility: CLINIC | Age: 67
End: 2022-07-08
Payer: COMMERCIAL

## 2022-07-08 DIAGNOSIS — M25.511 CHRONIC RIGHT SHOULDER PAIN: Primary | ICD-10-CM

## 2022-07-08 DIAGNOSIS — G89.29 CHRONIC RIGHT SHOULDER PAIN: Primary | ICD-10-CM

## 2022-07-08 PROCEDURE — 97112 NEUROMUSCULAR REEDUCATION: CPT | Performed by: PHYSICAL THERAPIST

## 2022-07-08 PROCEDURE — 97110 THERAPEUTIC EXERCISES: CPT | Performed by: PHYSICAL THERAPIST

## 2022-07-11 DIAGNOSIS — E11.9 CONTROLLED TYPE 2 DIABETES MELLITUS WITHOUT COMPLICATION, WITHOUT LONG-TERM CURRENT USE OF INSULIN (HCC): ICD-10-CM

## 2022-07-11 RX ORDER — SAXAGLIPTIN AND METFORMIN HYDROCHLORIDE 5; 1000 MG/1; MG/1
1 TABLET, FILM COATED, EXTENDED RELEASE ORAL DAILY
Qty: 30 TABLET | Refills: 1 | Status: SHIPPED | OUTPATIENT
Start: 2022-07-11

## 2022-07-15 ENCOUNTER — APPOINTMENT (OUTPATIENT)
Dept: PHYSICAL THERAPY | Facility: CLINIC | Age: 67
End: 2022-07-15
Payer: COMMERCIAL

## 2022-07-25 DIAGNOSIS — M54.50 LOW BACK PAIN WITHOUT SCIATICA, UNSPECIFIED BACK PAIN LATERALITY, UNSPECIFIED CHRONICITY: ICD-10-CM

## 2022-07-25 DIAGNOSIS — R52 PAIN: ICD-10-CM

## 2022-07-25 DIAGNOSIS — J44.9 CHRONIC OBSTRUCTIVE PULMONARY DISEASE, UNSPECIFIED COPD TYPE (HCC): Primary | ICD-10-CM

## 2022-07-25 RX ORDER — METHOCARBAMOL 750 MG/1
750 TABLET, FILM COATED ORAL EVERY 6 HOURS PRN
Qty: 30 TABLET | Refills: 0 | Status: CANCELLED | OUTPATIENT
Start: 2022-07-25

## 2022-07-25 RX ORDER — HYDROCODONE BITARTRATE AND ACETAMINOPHEN 5; 325 MG/1; MG/1
1 TABLET ORAL EVERY 6 HOURS PRN
Qty: 30 TABLET | Refills: 0 | Status: CANCELLED | OUTPATIENT
Start: 2022-07-25

## 2022-07-25 RX ORDER — ALBUTEROL SULFATE 2.5 MG/3ML
2.5 SOLUTION RESPIRATORY (INHALATION) 2 TIMES DAILY
Qty: 3 ML | Refills: 6 | Status: SHIPPED | OUTPATIENT
Start: 2022-07-25

## 2022-07-26 RX ORDER — HYDROCODONE BITARTRATE AND ACETAMINOPHEN 5; 325 MG/1; MG/1
1 TABLET ORAL EVERY 6 HOURS PRN
Qty: 30 TABLET | Refills: 0 | Status: SHIPPED | OUTPATIENT
Start: 2022-07-26 | End: 2022-08-25 | Stop reason: SDUPTHER

## 2022-07-26 RX ORDER — METHOCARBAMOL 750 MG/1
750 TABLET, FILM COATED ORAL EVERY 6 HOURS PRN
Qty: 30 TABLET | Refills: 0 | Status: SHIPPED | OUTPATIENT
Start: 2022-07-26 | End: 2022-08-25 | Stop reason: SDUPTHER

## 2022-07-29 DIAGNOSIS — E11.8 TYPE 2 DIABETES MELLITUS WITH COMPLICATION, WITHOUT LONG-TERM CURRENT USE OF INSULIN (HCC): ICD-10-CM

## 2022-07-29 RX ORDER — GLIMEPIRIDE 1 MG/1
1 TABLET ORAL 2 TIMES DAILY
Qty: 180 TABLET | Refills: 0 | Status: SHIPPED | OUTPATIENT
Start: 2022-07-29

## 2022-08-25 DIAGNOSIS — M54.50 LOW BACK PAIN WITHOUT SCIATICA, UNSPECIFIED BACK PAIN LATERALITY, UNSPECIFIED CHRONICITY: ICD-10-CM

## 2022-08-25 DIAGNOSIS — R52 PAIN: ICD-10-CM

## 2022-08-25 RX ORDER — HYDROCODONE BITARTRATE AND ACETAMINOPHEN 5; 325 MG/1; MG/1
1 TABLET ORAL EVERY 6 HOURS PRN
Qty: 30 TABLET | Refills: 0 | Status: SHIPPED | OUTPATIENT
Start: 2022-08-25 | End: 2022-09-23 | Stop reason: SDUPTHER

## 2022-08-25 RX ORDER — METHOCARBAMOL 750 MG/1
750 TABLET, FILM COATED ORAL EVERY 6 HOURS PRN
Qty: 30 TABLET | Refills: 0 | Status: SHIPPED | OUTPATIENT
Start: 2022-08-25 | End: 2022-09-23 | Stop reason: SDUPTHER

## 2022-08-31 ENCOUNTER — APPOINTMENT (OUTPATIENT)
Dept: LAB | Facility: CLINIC | Age: 67
End: 2022-08-31
Payer: COMMERCIAL

## 2022-08-31 DIAGNOSIS — Z13.6 SCREENING FOR CARDIOVASCULAR CONDITION: ICD-10-CM

## 2022-08-31 DIAGNOSIS — E78.5 HYPERLIPIDEMIA, UNSPECIFIED HYPERLIPIDEMIA TYPE: ICD-10-CM

## 2022-08-31 DIAGNOSIS — Z79.899 HIGH RISK MEDICATION USE: ICD-10-CM

## 2022-08-31 DIAGNOSIS — E11.9 CONTROLLED TYPE 2 DIABETES MELLITUS WITHOUT COMPLICATION, WITHOUT LONG-TERM CURRENT USE OF INSULIN (HCC): ICD-10-CM

## 2022-08-31 DIAGNOSIS — Z12.5 SCREENING FOR PROSTATE CANCER: ICD-10-CM

## 2022-08-31 LAB
ALBUMIN SERPL BCP-MCNC: 3.4 G/DL (ref 3.5–5)
ALP SERPL-CCNC: 52 U/L (ref 46–116)
ALT SERPL W P-5'-P-CCNC: 39 U/L (ref 12–78)
ANION GAP SERPL CALCULATED.3IONS-SCNC: 1 MMOL/L (ref 4–13)
AST SERPL W P-5'-P-CCNC: 18 U/L (ref 5–45)
BACTERIA UR QL AUTO: ABNORMAL /HPF
BASOPHILS # BLD AUTO: 0.08 THOUSANDS/ΜL (ref 0–0.1)
BASOPHILS NFR BLD AUTO: 1 % (ref 0–1)
BILIRUB SERPL-MCNC: 0.55 MG/DL (ref 0.2–1)
BILIRUB UR QL STRIP: NEGATIVE
BUN SERPL-MCNC: 13 MG/DL (ref 5–25)
CALCIUM ALBUM COR SERPL-MCNC: 10.3 MG/DL (ref 8.3–10.1)
CALCIUM SERPL-MCNC: 9.8 MG/DL (ref 8.3–10.1)
CHLORIDE SERPL-SCNC: 107 MMOL/L (ref 96–108)
CHOLEST SERPL-MCNC: 124 MG/DL
CLARITY UR: CLEAR
CO2 SERPL-SCNC: 30 MMOL/L (ref 21–32)
COLOR UR: ABNORMAL
CREAT SERPL-MCNC: 1.06 MG/DL (ref 0.6–1.3)
CREAT UR-MCNC: 127 MG/DL
EOSINOPHIL # BLD AUTO: 0.45 THOUSAND/ΜL (ref 0–0.61)
EOSINOPHIL NFR BLD AUTO: 5 % (ref 0–6)
ERYTHROCYTE [DISTWIDTH] IN BLOOD BY AUTOMATED COUNT: 12.8 % (ref 11.6–15.1)
GFR SERPL CREATININE-BSD FRML MDRD: 72 ML/MIN/1.73SQ M
GLUCOSE P FAST SERPL-MCNC: 133 MG/DL (ref 65–99)
GLUCOSE UR STRIP-MCNC: NEGATIVE MG/DL
HCT VFR BLD AUTO: 43.8 % (ref 36.5–49.3)
HDLC SERPL-MCNC: 40 MG/DL
HGB BLD-MCNC: 14.2 G/DL (ref 12–17)
HGB UR QL STRIP.AUTO: NEGATIVE
IMM GRANULOCYTES # BLD AUTO: 0.06 THOUSAND/UL (ref 0–0.2)
IMM GRANULOCYTES NFR BLD AUTO: 1 % (ref 0–2)
KETONES UR STRIP-MCNC: NEGATIVE MG/DL
LDLC SERPL CALC-MCNC: 59 MG/DL (ref 0–100)
LEUKOCYTE ESTERASE UR QL STRIP: NEGATIVE
LYMPHOCYTES # BLD AUTO: 2.24 THOUSANDS/ΜL (ref 0.6–4.47)
LYMPHOCYTES NFR BLD AUTO: 22 % (ref 14–44)
MCH RBC QN AUTO: 31.5 PG (ref 26.8–34.3)
MCHC RBC AUTO-ENTMCNC: 32.4 G/DL (ref 31.4–37.4)
MCV RBC AUTO: 97 FL (ref 82–98)
MICROALBUMIN UR-MCNC: 9.1 MG/L (ref 0–20)
MICROALBUMIN/CREAT 24H UR: 7 MG/G CREATININE (ref 0–30)
MONOCYTES # BLD AUTO: 0.6 THOUSAND/ΜL (ref 0.17–1.22)
MONOCYTES NFR BLD AUTO: 6 % (ref 4–12)
MUCOUS THREADS UR QL AUTO: ABNORMAL
NEUTROPHILS # BLD AUTO: 6.57 THOUSANDS/ΜL (ref 1.85–7.62)
NEUTS SEG NFR BLD AUTO: 65 % (ref 43–75)
NITRITE UR QL STRIP: NEGATIVE
NON-SQ EPI CELLS URNS QL MICRO: ABNORMAL /HPF
NONHDLC SERPL-MCNC: 84 MG/DL
NRBC BLD AUTO-RTO: 0 /100 WBCS
PH UR STRIP.AUTO: 6.5 [PH]
PLATELET # BLD AUTO: 176 THOUSANDS/UL (ref 149–390)
PMV BLD AUTO: 10.4 FL (ref 8.9–12.7)
POTASSIUM SERPL-SCNC: 4.8 MMOL/L (ref 3.5–5.3)
PROT SERPL-MCNC: 7.5 G/DL (ref 6.4–8.4)
PROT UR STRIP-MCNC: ABNORMAL MG/DL
PSA SERPL-MCNC: 0.4 NG/ML (ref 0–4)
RBC # BLD AUTO: 4.51 MILLION/UL (ref 3.88–5.62)
RBC #/AREA URNS AUTO: ABNORMAL /HPF
SODIUM SERPL-SCNC: 138 MMOL/L (ref 135–147)
SP GR UR STRIP.AUTO: 1.02 (ref 1–1.03)
TRIGL SERPL-MCNC: 125 MG/DL
UROBILINOGEN UR STRIP-ACNC: <2 MG/DL
WBC # BLD AUTO: 10 THOUSAND/UL (ref 4.31–10.16)
WBC #/AREA URNS AUTO: ABNORMAL /HPF

## 2022-08-31 PROCEDURE — 36415 COLL VENOUS BLD VENIPUNCTURE: CPT

## 2022-08-31 PROCEDURE — 80053 COMPREHEN METABOLIC PANEL: CPT

## 2022-08-31 PROCEDURE — 80061 LIPID PANEL: CPT

## 2022-08-31 PROCEDURE — 85025 COMPLETE CBC W/AUTO DIFF WBC: CPT

## 2022-08-31 PROCEDURE — 82570 ASSAY OF URINE CREATININE: CPT

## 2022-08-31 PROCEDURE — G0103 PSA SCREENING: HCPCS

## 2022-08-31 PROCEDURE — 82043 UR ALBUMIN QUANTITATIVE: CPT

## 2022-09-13 ENCOUNTER — OFFICE VISIT (OUTPATIENT)
Dept: SLEEP CENTER | Facility: HOSPITAL | Age: 67
End: 2022-09-13
Payer: COMMERCIAL

## 2022-09-13 VITALS
BODY MASS INDEX: 33.07 KG/M2 | HEART RATE: 64 BPM | OXYGEN SATURATION: 93 % | DIASTOLIC BLOOD PRESSURE: 70 MMHG | WEIGHT: 231 LBS | SYSTOLIC BLOOD PRESSURE: 120 MMHG | HEIGHT: 70 IN

## 2022-09-13 DIAGNOSIS — E66.9 OBESITY (BMI 30-39.9): ICD-10-CM

## 2022-09-13 DIAGNOSIS — J20.9 COPD (CHRONIC OBSTRUCTIVE PULMONARY DISEASE) WITH ACUTE BRONCHITIS (HCC): ICD-10-CM

## 2022-09-13 DIAGNOSIS — E11.9 CONTROLLED TYPE 2 DIABETES MELLITUS WITHOUT COMPLICATION, WITHOUT LONG-TERM CURRENT USE OF INSULIN (HCC): ICD-10-CM

## 2022-09-13 DIAGNOSIS — G89.4 CHRONIC PAIN DISORDER: ICD-10-CM

## 2022-09-13 DIAGNOSIS — J44.0 COPD (CHRONIC OBSTRUCTIVE PULMONARY DISEASE) WITH ACUTE BRONCHITIS (HCC): ICD-10-CM

## 2022-09-13 DIAGNOSIS — I10 ESSENTIAL HYPERTENSION: ICD-10-CM

## 2022-09-13 DIAGNOSIS — G47.33 OBSTRUCTIVE SLEEP APNEA: Primary | ICD-10-CM

## 2022-09-13 DIAGNOSIS — R45.86 MOOD DISTURBANCE: ICD-10-CM

## 2022-09-13 PROCEDURE — 99214 OFFICE O/P EST MOD 30 MIN: CPT | Performed by: INTERNAL MEDICINE

## 2022-09-13 NOTE — PATIENT INSTRUCTIONS

## 2022-09-13 NOTE — PROGRESS NOTES
Follow-Up Note - [facility]   Evelyne Clarkir  79 y o  male  Catie Lockhart  SRU:[id number]  DOS:9/13/2022    CC: I saw this patient for follow-up in clinic today for Sleep disordered breathing, Coexisting Sleep and Medical Problems  A retitration study in 2018 demonstrated sleep disordered breathing was successfully remediated with PAP at 20/14 cm H2O  Mean oxygen saturation was 91% and there were some desaturations to a sonu of 86%  PFSH, Problem List, Medications & Allergies were reviewed in EMR  Interval changes: none reported  He  has a past medical history of Old myocardial infarction and Tear of medial meniscus of left knee, subsequent encounter  He has a current medication list which includes the following prescription(s): albuterol, atenolol, velvet contour monitor, blood glucose monitor system, clopidogrel, ezetimibe-simvastatin, gabapentin, glimepiride, hydrocodone-acetaminophen, kombiglyze xr, methocarbamol, nitroglycerin, quetiapine, sildenafil, and stiolto respimat  PHYSIOLOGICAL DATA REVIEW AND INTERPRETATION:    using PAP > 4 hours/night 97%  Estimated BENJA 0 1/hour with pressure of 25/14cm H2O @90th/95th percentile; Patient has not been using ozone based devices to sanitize the machine  SUBJECTIVE: Regarding use of PAP, Mathew Lopez reports:   · no adverse effects: ; has not noticed any fibres or foreign material in air line  · He is benefiting from use: sleeping better   Sleep Routine: Mathew Lopez reports getting 8 hrs sleep work days and more on days off; he has no difficulty initiating or maintaining sleep   He arises needing an alarm and feels refreshed  Mathew Lopez denies Excessive Daytime Sleepiness,   He rated himself at Total score: 0 /24 on the [place] Sleepiness Scale  Habits: reports that he has been smoking cigarettes and cigars  He started smoking about 52 years ago  He has been smoking about 0 25 packs per day   He has quit using smokeless tobacco ,  reports current alcohol use , reports no history of drug use , Caffeine use:limited ; Exercise routine: regular    ROS: reviewed & as attached  Significant for few lb weight reduction since his last visit  He reported no nasal, respiratory or cardiac symptoms  He has back and radicular pain that is controlled on gabapentin  Mood has been stable  EXAM: /70   Pulse 64   Ht 5' 10" (1 778 m)   Wt 105 kg (231 lb)   SpO2 93%   BMI 33 15 kg/m²     Wt Readings from Last 3 Encounters:   09/13/22 105 kg (231 lb)   06/30/22 107 kg (236 lb)   05/05/22 107 kg (235 lb)      Patient is well groomed; well appearing  CNS: Alert, orientated, clear & coherent speech  Psych: cooperativeand in no distress  Mental state:appears normal   H&N: EOMI; NC/AT:no facial pressure marks, no rashes  Skin/Extrem: col & hydration normal; no edema  Resp: Respiratory effort is normal  Physical findings otherwise essentially unchanged from previous  IMPRESSION: Problem List Items & Comorbidities Addressed this Visit    1  Obstructive sleep apnea  PAP DME Resupply/Reorder   2  COPD (chronic obstructive pulmonary disease) with acute bronchitis ([facility] [address] )     3  Essential hypertension     4  Mood disturbance     5  Chronic pain disorder     6  Obesity (BMI 30-39 9)     7  Controlled type 2 diabetes mellitus without complication, without long-term current use of insulin ([facility] [address] )         PLAN:  1  I reviewed results of prior studies and physiologic data with the patient  2  I discussed treatment options with risks and benefits  3  Treatment with  PAP is medically necessary and Simon Kin is agreable to continue use  4  Care of equipment, methods to improve comfort using PAP and importance of compliance with therapy were discussed  5  Pressure setting: continue 25/14 cmH2O     6  Rx provided to replace  supplies and Care coordinated with DME provider  7  Discussed strategies for weight reduction      8  Follow-up is advised in 1 year or sooner if needed to monitor progress, compliance and to adjust therapy  Thank you for allowing me to participate in the care of this patient  Sincerely,     Authenticated electronically on 91/82/23   Board Certified Specialist     Portions of the record may have been created with voice recognition software  Occasional wrong word or "sound a like" substitutions may have occurred due to the inherent limitations of voice recognition software  There may also be notations and random deletions of words or characters from malfunctioning software  Read the chart carefully and recognize, using context, where substitutions/deletions have occurred

## 2022-09-14 ENCOUNTER — OFFICE VISIT (OUTPATIENT)
Dept: PAIN MEDICINE | Facility: CLINIC | Age: 67
End: 2022-09-14
Payer: COMMERCIAL

## 2022-09-14 ENCOUNTER — TELEPHONE (OUTPATIENT)
Dept: SLEEP CENTER | Facility: CLINIC | Age: 67
End: 2022-09-14

## 2022-09-14 VITALS
HEIGHT: 70 IN | DIASTOLIC BLOOD PRESSURE: 70 MMHG | HEART RATE: 59 BPM | BODY MASS INDEX: 32.78 KG/M2 | WEIGHT: 229 LBS | TEMPERATURE: 98 F | SYSTOLIC BLOOD PRESSURE: 120 MMHG

## 2022-09-14 DIAGNOSIS — G89.4 CHRONIC PAIN SYNDROME: Primary | ICD-10-CM

## 2022-09-14 DIAGNOSIS — M25.552 LEFT HIP PAIN: ICD-10-CM

## 2022-09-14 DIAGNOSIS — M54.40 LOW BACK PAIN WITH SCIATICA, SCIATICA LATERALITY UNSPECIFIED, UNSPECIFIED BACK PAIN LATERALITY, UNSPECIFIED CHRONICITY: ICD-10-CM

## 2022-09-14 DIAGNOSIS — M54.16 LUMBAR RADICULOPATHY: ICD-10-CM

## 2022-09-14 DIAGNOSIS — M48.061 LUMBAR FORAMINAL STENOSIS: ICD-10-CM

## 2022-09-14 DIAGNOSIS — M51.27 HERNIATED NUCLEUS PULPOSUS, L5-S1: ICD-10-CM

## 2022-09-14 PROCEDURE — 99214 OFFICE O/P EST MOD 30 MIN: CPT | Performed by: NURSE PRACTITIONER

## 2022-09-14 RX ORDER — GABAPENTIN 400 MG/1
CAPSULE ORAL
Qty: 90 CAPSULE | Refills: 5 | Status: SHIPPED | OUTPATIENT
Start: 2022-09-14

## 2022-09-14 NOTE — PROGRESS NOTES
Assessment:  1  Chronic pain syndrome    2  Low back pain with sciatica, sciatica laterality unspecified, unspecified back pain laterality, unspecified chronicity    3  Left hip pain    4  Lumbar radiculopathy    5  Herniated nucleus pulposus, L5-S1    6  Lumbar foraminal stenosis        Plan:  While the patient was in the office today, I did have a thorough conversation with the patient regarding their chronic pain syndrome, symptoms, medication regimen, and treatment plan  I did review with the patient at this point time since he is just on the cusp of the therapeutic dosing of gabapentin with definite relief and improvement, 1 option would be to further increase the gabapentin to an even more therapeutic dose of 1600 mg a day and see how he does  However, for now, the patient would like to hold off and continue the gabapentin at the current 1200 mg a day dosing and understands that if between now and his next office visit he decides he would like to increase the gabapentin he should call our office  The patient was agreeable and verbalized an understanding  I encouraged the patient continue to try to slowly and steadily increase his activity, as tolerated, allowing pain to be his guide  The patient was agreeable and verbalized an understanding  The patient will follow-up in 6 months for medication prescription refill and reevaluation  The patient was advised to contact the office should their symptoms worsen in the interim  The patient was agreeable and verbalized an understanding  History of Present Illness: The patient is a 79 y o  male last seen on 1/5/2022 who presents for a follow up office visit in regards to chronic pain syndrome, as the patient's pain has been ongoing for greater than a year,  secondary to herniated lumbar discs with stenosis and radiculopathy as well as chronic left hip pain    The patient currently reports that since his last office visit with the increase in the gabapentin to the 1200 mg a day dosing overall he is noting moderate stable relief of his chronic pain symptoms, without any significant side effects or issues  The patient presents today for regular medication follow-up visit  Current pain medications includes:  Gabapentin 400 mg t i d  The patient reports that this regimen is providing 50% pain relief  The patient is reporting no side effects from this pain medication regimen  I have personally reviewed and/or updated the patient's past medical history, past surgical history, family history, social history, current medications, allergies, and vital signs today  Review of Systems:    Review of Systems   Respiratory: Negative for shortness of breath  Cardiovascular: Negative for chest pain  Gastrointestinal: Negative for constipation, diarrhea, nausea and vomiting  Musculoskeletal: Positive for gait problem  Negative for arthralgias, joint swelling and myalgias  Skin: Negative for rash  Neurological: Negative for dizziness, seizures and weakness  All other systems reviewed and are negative          Past Medical History:   Diagnosis Date    Old myocardial infarction     Tear of medial meniscus of left knee, subsequent encounter     RESOLVE: [id number]       Past Surgical History:   Procedure Laterality Date    APPENDECTOMY      CORONARY ANGIOPLASTY      CORONARY ARTERY BYPASS GRAFT      KNEE ARTHROSCOPY      KNEE SURGERY Right     TONSILLECTOMY AND ADENOIDECTOMY      UMBILICAL HERNIA REPAIR         Family History   Adopted: Yes       Social History     Occupational History    Occupation: WORKING FULL TIME    Tobacco Use    Smoking status: Current Some Day Smoker     Packs/day: 0 25     Types: Cigarettes, Cigars     Start date: 5     Last attempt to quit: 10/2/2013     Years since quittin 9    Smokeless tobacco: Former User    Tobacco comment: FORMER SMOKER AS PER ALL SCRIPTS    Vaping Use    Vaping Use: Never used Substance and Sexual Activity    Alcohol use: Yes     Comment: rare    Drug use: Never     Comment: Occasional    Sexual activity: Yes     Partners: Female     Birth control/protection: None         Current Outpatient Medications:     albuterol (2 5 mg/3 mL) 0 083 % nebulizer solution, Take 3 mL (2 5 mg total) by nebulization 2 (two) times a day, Disp: 3 mL, Rfl: 6    atenolol (TENORMIN) 25 mg tablet, Take 1 tablet by mouth daily, Disp: , Rfl: 1    clopidogrel (PLAVIX) 75 mg tablet, Take 1 tablet (75 mg total) by mouth daily, Disp: 90 tablet, Rfl: 1    ezetimibe-simvastatin (VYTORIN) 10-40 mg per tablet, Take 1 tablet by mouth daily, Disp: , Rfl:     gabapentin (NEURONTIN) 400 mg capsule, Take 1 PO TID , Disp: 90 capsule, Rfl: 5    glimepiride (AMARYL) 1 mg tablet, Take 1 tablet (1 mg total) by mouth 2 (two) times a day, Disp: 180 tablet, Rfl: 0    HYDROcodone-acetaminophen (NORCO) 5-325 mg per tablet, Take 1 tablet by mouth every 6 (six) hours as needed for pain Max Daily Amount: 4 tablets, Disp: 30 tablet, Rfl: 0    Kombiglyze XR 5-1000 MG TB24, Take 1 tablet by mouth daily, Disp: 30 tablet, Rfl: 1    methocarbamol (ROBAXIN) 750 mg tablet, Take 1 tablet (750 mg total) by mouth every 6 (six) hours as needed for muscle spasms, Disp: 30 tablet, Rfl: 0    nitroglycerin (NITROLINGUAL) 0 4 mg/spray spray, PLACE 1 SPRAY UNDER THE TONGUE EVERY 5 MINUTES FOR A MAXIMUM OF 3 DOSES AS DIRECTED FOR CHEST PAIN, Disp: , Rfl:     QUEtiapine (SEROquel) 100 mg tablet, Take 1 tablet by mouth daily, Disp: , Rfl:     Stiolto Respimat 2 5-2 5 MCG/ACT inhaler, Inhale 2 puffs daily, Disp: , Rfl:     Blood Glucose Monitoring Suppl (Porch CONTOUR MONITOR) MIGUEL ANGEL, by Does not apply route 2 (two) times a day, Disp: 1 Device, Rfl: 0    Blood Glucose Monitoring Suppl (BLOOD GLUCOSE MONITOR SYSTEM) w/Device KIT, by Does not apply route 2 (two) times a day, Disp: 100 each, Rfl: 3    sildenafil (VIAGRA) 100 mg tablet, as needed , Disp: , Rfl: 1    Allergies   Allergen Reactions    Penicillins Other (See Comments)     hives-emily cefazolin       Physical Exam:    /70   Pulse 59   Temp 98 °F (36 7 °C)   Ht 5' 10" (1 778 m)   Wt 104 kg (229 lb)   BMI 32 86 kg/m²     Constitutional:normal, well developed, well nourished, alert, in no distress and non-toxic and no overt pain behavior  and overweight  Eyes:anicteric  HEENT:grossly intact  Neck:supple, symmetric, trachea midline and no masses   Pulmonary:even and unlabored  Cardiovascular:No edema or pitting edema present  Skin:Normal without rashes or lesions and well hydrated  Psychiatric:Mood and affect appropriate  Neurologic:Cranial Nerves II-XII grossly intact  Musculoskeletal:normal      Imaging  No orders to display         No orders of the defined types were placed in this encounter

## 2022-09-15 ENCOUNTER — OFFICE VISIT (OUTPATIENT)
Dept: FAMILY MEDICINE CLINIC | Facility: CLINIC | Age: 67
End: 2022-09-15
Payer: COMMERCIAL

## 2022-09-15 VITALS
BODY MASS INDEX: 32.81 KG/M2 | HEIGHT: 70 IN | RESPIRATION RATE: 20 BRPM | SYSTOLIC BLOOD PRESSURE: 128 MMHG | DIASTOLIC BLOOD PRESSURE: 68 MMHG | TEMPERATURE: 98.4 F | OXYGEN SATURATION: 97 % | WEIGHT: 229.2 LBS | HEART RATE: 62 BPM

## 2022-09-15 DIAGNOSIS — E11.9 CONTROLLED TYPE 2 DIABETES MELLITUS WITHOUT COMPLICATION, WITHOUT LONG-TERM CURRENT USE OF INSULIN (HCC): Primary | ICD-10-CM

## 2022-09-15 DIAGNOSIS — I10 ESSENTIAL HYPERTENSION: ICD-10-CM

## 2022-09-15 DIAGNOSIS — E78.5 HYPERLIPIDEMIA, UNSPECIFIED HYPERLIPIDEMIA TYPE: ICD-10-CM

## 2022-09-15 LAB — SL AMB POCT HEMOGLOBIN AIC: 7.2 (ref ?–6.5)

## 2022-09-15 PROCEDURE — 99214 OFFICE O/P EST MOD 30 MIN: CPT | Performed by: FAMILY MEDICINE

## 2022-09-15 PROCEDURE — 83036 HEMOGLOBIN GLYCOSYLATED A1C: CPT | Performed by: FAMILY MEDICINE

## 2022-09-15 NOTE — PROGRESS NOTES
Name: Shannon Dick      :       MRN: [id number]  Encounter Provider: Parvez Mccabe DO  Encounter Date: 9/15/2022   Encounter department: [address] [place] [address]      1  Controlled type 2 diabetes mellitus without complication, without long-term current use of insulin (HCC)  -     POCT hemoglobin A1c    2  Hyperlipidemia, unspecified hyperlipidemia type    3  Essential hypertension    Overall patient doing well  Will continue current medications his A1c is controlled at 7 2  Discussed his A1c goal would like to see it under 7 in the future  He will continue his current medications  He can follow-up in 6 months or sooner if needed  Patient also be getting a colonoscopy as well as going over his eye visit sometime in the near future which he is due for       Subjective      Patient presents today for six-month checkup chronic conditions  He has no acute complaints today   His A1c is controlled at 7 2   He is aware he needs to get an eye visit as well as a colonoscopy  Rest his health maintenance is currently up-to-date his blood pressure and vital signs are all under control    Review of Systems   Constitutional: Negative  HENT: Negative  Eyes: Negative  Respiratory: Negative  Cardiovascular: Negative  Gastrointestinal: Negative  Endocrine: Negative  Genitourinary: Negative  Musculoskeletal: Negative  Skin: Negative  Allergic/Immunologic: Negative  Neurological: Negative  Hematological: Negative  Psychiatric/Behavioral: Negative  All other systems reviewed and are negative        Current Outpatient Medications on File Prior to Visit   Medication Sig    albuterol (2 5 mg/3 mL) 0 083 % nebulizer solution Take 3 mL (2 5 mg total) by nebulization 2 (two) times a day    atenolol (TENORMIN) 25 mg tablet Take 1 tablet by mouth daily    Blood Glucose Monitoring Suppl (RUBINA CONTOUR MONITOR) MIGUEL ANGEL by Does not apply route 2 (two) times a day  Blood Glucose Monitoring Suppl (BLOOD GLUCOSE MONITOR SYSTEM) w/Device KIT by Does not apply route 2 (two) times a day    clopidogrel (PLAVIX) 75 mg tablet Take 1 tablet (75 mg total) by mouth daily    ezetimibe-simvastatin (VYTORIN) 10-40 mg per tablet Take 1 tablet by mouth daily    gabapentin (NEURONTIN) 400 mg capsule Take 1 PO TID   glimepiride (AMARYL) 1 mg tablet Take 1 tablet (1 mg total) by mouth 2 (two) times a day    HYDROcodone-acetaminophen (NORCO) 5-325 mg per tablet Take 1 tablet by mouth every 6 (six) hours as needed for pain Max Daily Amount: 4 tablets    Kombiglyze XR 5-1000 MG TB24 Take 1 tablet by mouth daily    methocarbamol (ROBAXIN) 750 mg tablet Take 1 tablet (750 mg total) by mouth every 6 (six) hours as needed for muscle spasms    nitroglycerin (NITROLINGUAL) 0 4 mg/spray spray PLACE 1 SPRAY UNDER THE TONGUE EVERY 5 MINUTES FOR A MAXIMUM OF 3 DOSES AS DIRECTED FOR CHEST PAIN    QUEtiapine (SEROquel) 100 mg tablet Take 1 tablet by mouth daily    sildenafil (VIAGRA) 100 mg tablet as needed     Stiolto Respimat 2 5-2 5 MCG/ACT inhaler Inhale 2 puffs daily       Objective     /68 (BP Location: Right arm, Patient Position: Sitting, Cuff Size: Large)   Pulse 62   Temp 98 4 °F (36 9 °C) (Tympanic)   Resp 20   Ht 5' 10" (1 778 m)   Wt 104 kg (229 lb 3 2 oz)   SpO2 97%   BMI 32 89 kg/m²     Physical Exam  Vitals and nursing note reviewed  Constitutional:       General: He is not in acute distress  Appearance: He is well-developed  HENT:      Head: Normocephalic  Right Ear: External ear normal       Left Ear: External ear normal       Nose: Nose normal    Eyes:      General:         Right eye: No discharge  Left eye: No discharge  Conjunctiva/sclera: Conjunctivae normal       Pupils: Pupils are equal, round, and reactive to light  Cardiovascular:      Rate and Rhythm: Normal rate and regular rhythm  Heart sounds: Normal heart sounds  Pulmonary:      Effort: Pulmonary effort is normal       Breath sounds: Normal breath sounds  Abdominal:      General: Bowel sounds are normal  There is no distension  Palpations: Abdomen is soft  Tenderness: There is no abdominal tenderness  Musculoskeletal:         General: Normal range of motion  Cervical back: Normal range of motion  Skin:     General: Skin is warm and dry  Findings: No rash  Neurological:      Mental Status: He is alert and oriented to person, place, and time  Cranial Nerves: No cranial nerve deficit  Psychiatric:         Behavior: Behavior normal          Thought Content: Thought content normal          Judgment: Judgment normal        Leo Page, DO  Falls Plan of Care: Balance, strength, and gait training instructions were provided

## 2022-09-15 NOTE — PATIENT INSTRUCTIONS
Fall Prevention   AMBULATORY CARE:   Fall prevention  includes ways to make your home and other areas safer  It also includes ways you can move more carefully to prevent a fall  Health conditions that cause changes in your blood pressure, vision, or muscle strength and coordination may increase your risk for falls  Medicines may also increase your risk for falls if they make you dizzy, weak, or sleepy  Call 911 or have someone else call if:   · You have fallen and are unconscious  · You have fallen and cannot move part of your body  Contact your healthcare provider if:   · You have fallen and have pain or a headache  · You have questions or concerns about your condition or care  Fall prevention tips:   · Stand or sit up slowly  This may help you keep your balance and prevent falls  · Use assistive devices as directed  Your healthcare provider may suggest that you use a cane or walker to help you keep your balance  You may need to have grab bars put in your bathroom near the toilet or in the shower  · Wear shoes that fit well and have soles that   Wear shoes both inside and outside  Use slippers with good   Do not wear shoes with high heels  · Wear a personal alarm  This is a device that allows you to call 911 if you fall and need help  Ask your healthcare provider for more information  · Stay active  Exercise can help strengthen your muscles and improve your balance  Your healthcare provider may recommend water aerobics or walking  He or she may also recommend physical therapy to improve your coordination  Never start an exercise program without talking to your healthcare provider first          · Manage your medical conditions  Keep all appointments with your healthcare providers  Visit your eye doctor as directed  Home safety tips:       · Add items to prevent falls in the bathroom  Put nonslip strips on your bath or shower floor to prevent you from slipping   Use a bath mat if you do not have carpet in the bathroom  This will prevent you from falling when you step out of the bath or shower  Use a shower seat so you do not need to stand while you shower  Sit on the toilet or a chair in your bathroom to dry yourself and put on clothing  This will prevent you from losing your balance from drying or dressing yourself while you are standing  · Keep paths clear  Remove books, shoes, and other objects from walkways and stairs  Place cords for telephones and lamps out of the way so that you do not need to walk over them  Tape them down if you cannot move them  Remove small rugs  If you cannot remove a rug, secure it with double-sided tape  This will prevent you from tripping  · Install bright lights in your home  Use night lights to help light paths to the bathroom or kitchen  Always turn on the light before you start walking  · Keep items you use often on shelves within reach  Do not use a step stool to help you reach an item  · Paint or place reflective tape on the edges of your stairs  This will help you see the stairs better  Follow up with your doctor as directed:  Write down your questions so you remember to ask them during your visits  © Copyright [employer] 2022 Information is for End User's use only and may not be sold, redistributed or otherwise used for commercial purposes  All illustrations and images included in CareNotes® are the copyrighted property of A D A M , Inc  or Jimbo Daigle   The above information is an  only  It is not intended as medical advice for individual conditions or treatments  Talk to your doctor, nurse or pharmacist before following any medical regimen to see if it is safe and effective for you

## 2022-09-23 DIAGNOSIS — R52 PAIN: ICD-10-CM

## 2022-09-23 DIAGNOSIS — M54.50 LOW BACK PAIN WITHOUT SCIATICA, UNSPECIFIED BACK PAIN LATERALITY, UNSPECIFIED CHRONICITY: ICD-10-CM

## 2022-09-23 RX ORDER — METHOCARBAMOL 750 MG/1
750 TABLET, FILM COATED ORAL EVERY 6 HOURS PRN
Qty: 30 TABLET | Refills: 0 | Status: SHIPPED | OUTPATIENT
Start: 2022-09-23 | End: 2022-10-18 | Stop reason: SDUPTHER

## 2022-09-23 RX ORDER — HYDROCODONE BITARTRATE AND ACETAMINOPHEN 5; 325 MG/1; MG/1
1 TABLET ORAL EVERY 6 HOURS PRN
Qty: 30 TABLET | Refills: 0 | Status: SHIPPED | OUTPATIENT
Start: 2022-09-23 | End: 2022-10-18 | Stop reason: SDUPTHER

## 2022-09-26 ENCOUNTER — TELEPHONE (OUTPATIENT)
Dept: GASTROENTEROLOGY | Facility: AMBULARY SURGERY CENTER | Age: 67
End: 2022-09-26

## 2022-10-18 DIAGNOSIS — R52 PAIN: ICD-10-CM

## 2022-10-18 DIAGNOSIS — M54.50 LOW BACK PAIN WITHOUT SCIATICA, UNSPECIFIED BACK PAIN LATERALITY, UNSPECIFIED CHRONICITY: ICD-10-CM

## 2022-10-18 RX ORDER — METHOCARBAMOL 750 MG/1
750 TABLET, FILM COATED ORAL EVERY 6 HOURS PRN
Qty: 30 TABLET | Refills: 0 | Status: SHIPPED | OUTPATIENT
Start: 2022-10-18

## 2022-10-18 RX ORDER — HYDROCODONE BITARTRATE AND ACETAMINOPHEN 5; 325 MG/1; MG/1
1 TABLET ORAL EVERY 6 HOURS PRN
Qty: 30 TABLET | Refills: 0 | Status: SHIPPED | OUTPATIENT
Start: 2022-10-18

## 2022-10-31 DIAGNOSIS — E11.8 TYPE 2 DIABETES MELLITUS WITH COMPLICATION, WITHOUT LONG-TERM CURRENT USE OF INSULIN (HCC): ICD-10-CM

## 2022-10-31 RX ORDER — GLIMEPIRIDE 1 MG/1
1 TABLET ORAL 2 TIMES DAILY
Qty: 180 TABLET | Refills: 0 | Status: SHIPPED | OUTPATIENT
Start: 2022-10-31

## 2022-11-04 DIAGNOSIS — J20.9 COPD (CHRONIC OBSTRUCTIVE PULMONARY DISEASE) WITH ACUTE BRONCHITIS (HCC): Primary | ICD-10-CM

## 2022-11-04 DIAGNOSIS — J44.0 COPD (CHRONIC OBSTRUCTIVE PULMONARY DISEASE) WITH ACUTE BRONCHITIS (HCC): Primary | ICD-10-CM

## 2022-11-04 RX ORDER — ALBUTEROL SULFATE 90 UG/1
2 AEROSOL, METERED RESPIRATORY (INHALATION) EVERY 6 HOURS PRN
Qty: 18 G | Refills: 1 | Status: SHIPPED | OUTPATIENT
Start: 2022-11-04

## 2022-11-23 DIAGNOSIS — M54.50 LOW BACK PAIN WITHOUT SCIATICA, UNSPECIFIED BACK PAIN LATERALITY, UNSPECIFIED CHRONICITY: ICD-10-CM

## 2022-11-23 RX ORDER — HYDROCODONE BITARTRATE AND ACETAMINOPHEN 5; 325 MG/1; MG/1
1 TABLET ORAL EVERY 6 HOURS PRN
Qty: 30 TABLET | Refills: 0 | Status: SHIPPED | OUTPATIENT
Start: 2022-11-23

## 2022-12-22 DIAGNOSIS — M54.50 LOW BACK PAIN WITHOUT SCIATICA, UNSPECIFIED BACK PAIN LATERALITY, UNSPECIFIED CHRONICITY: ICD-10-CM

## 2022-12-22 DIAGNOSIS — R52 PAIN: ICD-10-CM

## 2022-12-22 RX ORDER — METHOCARBAMOL 750 MG/1
750 TABLET, FILM COATED ORAL EVERY 6 HOURS PRN
Qty: 30 TABLET | Refills: 0 | Status: SHIPPED | OUTPATIENT
Start: 2022-12-22

## 2022-12-22 RX ORDER — HYDROCODONE BITARTRATE AND ACETAMINOPHEN 5; 325 MG/1; MG/1
1 TABLET ORAL EVERY 6 HOURS PRN
Qty: 30 TABLET | Refills: 0 | Status: SHIPPED | OUTPATIENT
Start: 2022-12-22

## 2023-01-12 DIAGNOSIS — E11.9 CONTROLLED TYPE 2 DIABETES MELLITUS WITHOUT COMPLICATION, WITHOUT LONG-TERM CURRENT USE OF INSULIN (HCC): ICD-10-CM

## 2023-01-12 RX ORDER — SAXAGLIPTIN AND METFORMIN HYDROCHLORIDE 5; 1000 MG/1; MG/1
1 TABLET, FILM COATED, EXTENDED RELEASE ORAL DAILY
Qty: 30 TABLET | Refills: 1 | Status: SHIPPED | OUTPATIENT
Start: 2023-01-12 | End: 2023-01-19 | Stop reason: SDUPTHER

## 2023-01-18 ENCOUNTER — TELEPHONE (OUTPATIENT)
Dept: FAMILY MEDICINE CLINIC | Facility: CLINIC | Age: 68
End: 2023-01-18

## 2023-01-18 DIAGNOSIS — E11.8 TYPE 2 DIABETES MELLITUS WITH COMPLICATION, WITHOUT LONG-TERM CURRENT USE OF INSULIN (HCC): ICD-10-CM

## 2023-01-18 RX ORDER — CLINDAMYCIN HYDROCHLORIDE 150 MG/1
150 CAPSULE ORAL EVERY 6 HOURS
COMMUNITY
Start: 2022-12-04

## 2023-01-18 RX ORDER — GLIMEPIRIDE 1 MG/1
1 TABLET ORAL 2 TIMES DAILY
Qty: 180 TABLET | Refills: 0 | Status: SHIPPED | OUTPATIENT
Start: 2023-01-18

## 2023-01-18 NOTE — TELEPHONE ENCOUNTER
Patient is calling to get his prescription refilled for diabetes and the pharmacy told him they sent over a prior auth to be filled out in order for him to get the medication   He is not able to get his medication without this taking care of

## 2023-01-19 DIAGNOSIS — E11.9 CONTROLLED TYPE 2 DIABETES MELLITUS WITHOUT COMPLICATION, WITHOUT LONG-TERM CURRENT USE OF INSULIN (HCC): ICD-10-CM

## 2023-01-19 RX ORDER — SAXAGLIPTIN AND METFORMIN HYDROCHLORIDE 5; 1000 MG/1; MG/1
1 TABLET, FILM COATED, EXTENDED RELEASE ORAL DAILY
Qty: 30 TABLET | Refills: 1 | Status: SHIPPED | OUTPATIENT
Start: 2023-01-19

## 2023-01-20 ENCOUNTER — TELEPHONE (OUTPATIENT)
Dept: FAMILY MEDICINE CLINIC | Facility: CLINIC | Age: 68
End: 2023-01-20

## 2023-01-20 DIAGNOSIS — E11.8 TYPE 2 DIABETES MELLITUS WITH COMPLICATION, WITHOUT LONG-TERM CURRENT USE OF INSULIN (HCC): Primary | ICD-10-CM

## 2023-01-20 RX ORDER — SITAGLIPTIN AND METFORMIN HYDROCHLORIDE 1000; 100 MG/1; MG/1
1 TABLET, FILM COATED, EXTENDED RELEASE ORAL
Qty: 90 TABLET | Refills: 1 | Status: SHIPPED | OUTPATIENT
Start: 2023-01-20

## 2023-01-23 DIAGNOSIS — M54.50 LOW BACK PAIN WITHOUT SCIATICA, UNSPECIFIED BACK PAIN LATERALITY, UNSPECIFIED CHRONICITY: ICD-10-CM

## 2023-01-23 RX ORDER — HYDROCODONE BITARTRATE AND ACETAMINOPHEN 5; 325 MG/1; MG/1
1 TABLET ORAL EVERY 6 HOURS PRN
Qty: 30 TABLET | Refills: 0 | Status: SHIPPED | OUTPATIENT
Start: 2023-01-23

## 2023-01-24 ENCOUNTER — TELEPHONE (OUTPATIENT)
Dept: FAMILY MEDICINE CLINIC | Facility: CLINIC | Age: 68
End: 2023-01-24

## 2023-01-30 DIAGNOSIS — E11.9 CONTROLLED TYPE 2 DIABETES MELLITUS WITHOUT COMPLICATION, WITHOUT LONG-TERM CURRENT USE OF INSULIN (HCC): ICD-10-CM

## 2023-01-30 RX ORDER — SAXAGLIPTIN AND METFORMIN HYDROCHLORIDE 5; 1000 MG/1; MG/1
1 TABLET, FILM COATED, EXTENDED RELEASE ORAL DAILY
Qty: 30 TABLET | Refills: 1 | Status: SHIPPED | OUTPATIENT
Start: 2023-01-30

## 2023-01-30 NOTE — TELEPHONE ENCOUNTER
Trace Edouard asked if we can please fax a script for his     Willow Cat    To    [facility] [phone]  Thank you

## 2023-02-20 DIAGNOSIS — R52 PAIN: ICD-10-CM

## 2023-02-20 DIAGNOSIS — M54.50 LOW BACK PAIN WITHOUT SCIATICA, UNSPECIFIED BACK PAIN LATERALITY, UNSPECIFIED CHRONICITY: ICD-10-CM

## 2023-02-20 DIAGNOSIS — E11.8 TYPE 2 DIABETES MELLITUS WITH COMPLICATION, WITHOUT LONG-TERM CURRENT USE OF INSULIN (HCC): ICD-10-CM

## 2023-02-20 RX ORDER — SITAGLIPTIN AND METFORMIN HYDROCHLORIDE 1000; 100 MG/1; MG/1
1 TABLET, FILM COATED, EXTENDED RELEASE ORAL
Qty: 90 TABLET | Refills: 0 | Status: SHIPPED | OUTPATIENT
Start: 2023-02-20

## 2023-02-20 RX ORDER — HYDROCODONE BITARTRATE AND ACETAMINOPHEN 5; 325 MG/1; MG/1
1 TABLET ORAL EVERY 6 HOURS PRN
Qty: 30 TABLET | Refills: 0 | Status: SHIPPED | OUTPATIENT
Start: 2023-02-20

## 2023-02-20 RX ORDER — METHOCARBAMOL 750 MG/1
750 TABLET, FILM COATED ORAL EVERY 6 HOURS PRN
Qty: 30 TABLET | Refills: 0 | Status: SHIPPED | OUTPATIENT
Start: 2023-02-20

## 2023-02-28 ENCOUNTER — TELEPHONE (OUTPATIENT)
Dept: PAIN MEDICINE | Facility: CLINIC | Age: 68
End: 2023-02-28

## 2023-02-28 NOTE — TELEPHONE ENCOUNTER
Caller: pt   Call back#: [phone]  Doctor: Pradeep Caputo    Reason for call: please update pts chart     Medicare:  ID: [id number]    Medicare plan G ([facility] ) STARTS ON  3/1/23  [id number]  agrement number: [id number]      [facility] INS: WILL END TODAY 2/28/23  -PT will bring in ins card tomorrow

## 2023-03-01 ENCOUNTER — OFFICE VISIT (OUTPATIENT)
Dept: PAIN MEDICINE | Facility: CLINIC | Age: 68
End: 2023-03-01

## 2023-03-01 VITALS
TEMPERATURE: 97.5 F | BODY MASS INDEX: 33.64 KG/M2 | SYSTOLIC BLOOD PRESSURE: 118 MMHG | DIASTOLIC BLOOD PRESSURE: 64 MMHG | HEART RATE: 55 BPM | WEIGHT: 235 LBS | HEIGHT: 70 IN

## 2023-03-01 DIAGNOSIS — M25.552 LEFT HIP PAIN: ICD-10-CM

## 2023-03-01 DIAGNOSIS — M48.061 LUMBAR FORAMINAL STENOSIS: ICD-10-CM

## 2023-03-01 DIAGNOSIS — G89.4 CHRONIC PAIN SYNDROME: Primary | ICD-10-CM

## 2023-03-01 DIAGNOSIS — M54.16 LUMBAR RADICULOPATHY: ICD-10-CM

## 2023-03-01 DIAGNOSIS — M54.40 LOW BACK PAIN WITH SCIATICA, SCIATICA LATERALITY UNSPECIFIED, UNSPECIFIED BACK PAIN LATERALITY, UNSPECIFIED CHRONICITY: ICD-10-CM

## 2023-03-01 DIAGNOSIS — M51.27 HERNIATED NUCLEUS PULPOSUS, L5-S1: ICD-10-CM

## 2023-03-01 RX ORDER — GABAPENTIN 400 MG/1
CAPSULE ORAL
Qty: 270 CAPSULE | Refills: 1 | Status: SHIPPED | OUTPATIENT
Start: 2023-03-01

## 2023-03-01 NOTE — PROGRESS NOTES
Assessment:  1  Chronic pain syndrome    2  Low back pain with sciatica, sciatica laterality unspecified, unspecified back pain laterality, unspecified chronicity    3  Left hip pain    4  Lumbar radiculopathy    5  Herniated nucleus pulposus, L5-S1    6  Lumbar foraminal stenosis        Plan:  While the patient was in the office today, I did have a thorough conversation with the patient regarding their chronic pain syndrome, symptoms, and medication regimen/treatment plan  I discussed with the patient that at this point time since he is noting significant and stable overall relief with his current dosage of gabapentin, without side effects, I feel it is reasonable and appropriate to continue the gabapentin as prescribed  The patient was agreeable and verbalized an understanding  I encouraged the patient to continue to try to increase his activity, as tolerated, allowing pain to be his guide  The patient was agreeable and verbalized an understanding  The patient will follow-up in 6 months for medication prescription refill and reevaluation  The patient was advised to contact the office should their symptoms worsen in the interim  The patient was agreeable and verbalized an understanding  History of Present Illness: The patient is a 79 y o  male last seen on 9/14/2022 who presents for a follow up office visit in regards to chronic pain syndrome, as the patient's pain has been ongoing for greater than a year, secondary to herniated lumbar disc with stenosis and radiculopathy as well as chronic left hip pain  The patient currently reports that since his last office visit with the increase in his gabapentin to 1200 mg a day he is noting significant and stable overall relief of his chronic pain symptoms  The patient presents today for regular medication follow-up visit  Current pain medications includes: Gabapentin 400 mg 3 times daily    The patient reports that this regimen is providing 70% pain relief  The patient is reporting no side effects from this pain medication regimen  I have personally reviewed and/or updated the patient's past medical history, past surgical history, family history, social history, current medications, allergies, and vital signs today  Review of Systems:    Review of Systems   Respiratory: Negative for shortness of breath  Cardiovascular: Negative for chest pain  Gastrointestinal: Negative for constipation, diarrhea, nausea and vomiting  Musculoskeletal: Positive for gait problem  Negative for arthralgias, joint swelling and myalgias  Skin: Negative for rash  Neurological: Negative for dizziness, seizures and weakness  All other systems reviewed and are negative          Past Medical History:   Diagnosis Date   • Old myocardial infarction    • Tear of medial meniscus of left knee, subsequent encounter     RESOLVE: [id number]       Past Surgical History:   Procedure Laterality Date   • APPENDECTOMY     • CORONARY ANGIOPLASTY     • CORONARY ARTERY BYPASS GRAFT     • KNEE ARTHROSCOPY     • KNEE SURGERY Right    • TONSILLECTOMY AND ADENOIDECTOMY     • UMBILICAL HERNIA REPAIR         Family History   Adopted: Yes       Social History     Occupational History   • Occupation: WORKING FULL TIME    Tobacco Use   • Smoking status: Some Days     Packs/day: 0 25     Types: Cigarettes, Cigars     Start date: 5     Last attempt to quit: 10/2/2013     Years since quittin 4   • Smokeless tobacco: Former   • Tobacco comments:     FORMER SMOKER AS PER ALL SCRIPTS    Vaping Use   • Vaping Use: Never used   Substance and Sexual Activity   • Alcohol use: Yes     Comment: rare   • Drug use: Never     Comment: Occasional   • Sexual activity: Yes     Partners: Female     Birth control/protection: None         Current Outpatient Medications:   •  albuterol (ProAir HFA) 90 mcg/act inhaler, Inhale 2 puffs every 6 (six) hours as needed for wheezing, Disp: 18 g, Rfl: 1  •  atenolol (TENORMIN) 25 mg tablet, Take 1 tablet by mouth daily, Disp: , Rfl: 1  •  clopidogrel (PLAVIX) 75 mg tablet, Take 1 tablet (75 mg total) by mouth daily, Disp: 90 tablet, Rfl: 1  •  ezetimibe-simvastatin (VYTORIN) 10-40 mg per tablet, Take 1 tablet by mouth daily, Disp: , Rfl:   •  gabapentin (NEURONTIN) 400 mg capsule, Take 1 PO TID , Disp: 270 capsule, Rfl: 1  •  glimepiride (AMARYL) 1 mg tablet, Take 1 tablet (1 mg total) by mouth 2 (two) times a day, Disp: 180 tablet, Rfl: 0  •  HYDROcodone-acetaminophen (NORCO) 5-325 mg per tablet, Take 1 tablet by mouth every 6 (six) hours as needed for pain Max Daily Amount: 4 tablets, Disp: 30 tablet, Rfl: 0  •  methocarbamol (ROBAXIN) 750 mg tablet, Take 1 tablet (750 mg total) by mouth every 6 (six) hours as needed for muscle spasms, Disp: 30 tablet, Rfl: 0  •  QUEtiapine (SEROquel) 100 mg tablet, Take 1 tablet by mouth daily, Disp: , Rfl:   •  SITagliptin-metFORMIN HCl ER (Janumet XR) 100-1000 MG TB24, Take 1 tablet by mouth daily with dinner, Disp: 90 tablet, Rfl: 0  •  albuterol (2 5 mg/3 mL) 0 083 % nebulizer solution, Take 3 mL (2 5 mg total) by nebulization 2 (two) times a day, Disp: 3 mL, Rfl: 6  •  Blood Glucose Monitoring Suppl (RUBINA CONTOUR MONITOR) MIGUEL ANGEL, by Does not apply route 2 (two) times a day, Disp: 1 Device, Rfl: 0  •  Blood Glucose Monitoring Suppl (BLOOD GLUCOSE MONITOR SYSTEM) w/Device KIT, by Does not apply route 2 (two) times a day, Disp: 100 each, Rfl: 3  •  clindamycin (CLEOCIN) 150 mg capsule, Take 150 mg by mouth every 6 (six) hours, Disp: , Rfl:   •  nitroglycerin (NITROLINGUAL) 0 4 mg/spray spray, PLACE 1 SPRAY UNDER THE TONGUE EVERY 5 MINUTES FOR A MAXIMUM OF 3 DOSES AS DIRECTED FOR CHEST PAIN, Disp: , Rfl:   •  sildenafil (VIAGRA) 100 mg tablet, as needed , Disp: , Rfl: 1    Allergies   Allergen Reactions   • Penicillins Other (See Comments)     hives-emily cefazolin       Physical Exam:    /64 (BP Location: Left arm, Patient Position: Sitting, Cuff Size: Large)   Pulse 55   Temp 97 5 °F (36 4 °C)   Ht 5' 10" (1 778 m)   Wt 107 kg (235 lb)   BMI 33 72 kg/m²     Constitutional:normal, well developed, well nourished, alert, in no distress and non-toxic and no overt pain behavior  and overweight  Eyes:anicteric  HEENT:grossly intact  Neck:supple, symmetric, trachea midline and no masses   Pulmonary:even and unlabored  Cardiovascular:No edema or pitting edema present  Skin:Normal without rashes or lesions and well hydrated  Psychiatric:Mood and affect appropriate  Neurologic:Cranial Nerves II-XII grossly intact  Musculoskeletal:The patient's gait is slightly antalgic, but steady without the use of any assistive devices  Imaging  No orders to display         No orders of the defined types were placed in this encounter

## 2023-03-14 DIAGNOSIS — M54.50 LOW BACK PAIN WITHOUT SCIATICA, UNSPECIFIED BACK PAIN LATERALITY, UNSPECIFIED CHRONICITY: ICD-10-CM

## 2023-03-14 RX ORDER — HYDROCODONE BITARTRATE AND ACETAMINOPHEN 5; 325 MG/1; MG/1
1 TABLET ORAL EVERY 6 HOURS PRN
Qty: 30 TABLET | Refills: 0 | Status: SHIPPED | OUTPATIENT
Start: 2023-03-14 | End: 2023-03-23 | Stop reason: SDUPTHER

## 2023-03-15 NOTE — TELEPHONE ENCOUNTER
Caller: patient    Doctor: Kendall Barton, [facility]    Reason for call: pt is calling to states he was told by his medical insurance that our office clerical team must reenter office visit information over to correct coverage error       Call back#: [phone]

## 2023-03-23 DIAGNOSIS — M54.50 LOW BACK PAIN WITHOUT SCIATICA, UNSPECIFIED BACK PAIN LATERALITY, UNSPECIFIED CHRONICITY: ICD-10-CM

## 2023-03-23 RX ORDER — HYDROCODONE BITARTRATE AND ACETAMINOPHEN 5; 325 MG/1; MG/1
1 TABLET ORAL EVERY 6 HOURS PRN
Qty: 30 TABLET | Refills: 0 | Status: SHIPPED | OUTPATIENT
Start: 2023-03-23

## 2023-03-23 NOTE — TELEPHONE ENCOUNTER
----- Message from [address] sent at 3/23/2023  6:44 AM EDT -----  Regarding: prescription not filled  Contact: [phone]  Good morning,  [facility] is all out of this medication    Would you please send to [facility]   Thank you  Mandy Barr

## 2023-04-05 ENCOUNTER — RA CDI HCC (OUTPATIENT)
Dept: OTHER | Facility: HOSPITAL | Age: 68
End: 2023-04-05

## 2023-04-05 NOTE — PROGRESS NOTES
[facility]  coding opportunities          Chart Reviewed number of suggestions sent to Provider: 2  E11 65  E11 22     Patients Insurance     Medicare Insurance: [employer]

## 2023-04-06 DIAGNOSIS — R52 PAIN: ICD-10-CM

## 2023-04-07 RX ORDER — METHOCARBAMOL 750 MG/1
750 TABLET, FILM COATED ORAL EVERY 6 HOURS PRN
Qty: 30 TABLET | Refills: 1 | Status: SHIPPED | OUTPATIENT
Start: 2023-04-07

## 2023-04-13 ENCOUNTER — TELEPHONE (OUTPATIENT)
Dept: ADMINISTRATIVE | Facility: OTHER | Age: 68
End: 2023-04-13

## 2023-04-13 NOTE — LETTER
Diabetic Eye Exam Form    Date Requested: 23  Patient: Kim Torres  Patient : 1955   Referring Provider: Billy Sahu DO      DIABETIC Eye Exam Date _______________________________      Type of Exam MUST be documented for Diabetic Eye Exams  Please CHECK ONE  Retinal Exam       Dilated Retinal Exam       OCT       Optomap-Iris Exam      Fundus Photography       Left Eye - Please check Retinopathy or No Retinopathy        Exam did show retinopathy    Exam did not show retinopathy       Right Eye - Please check Retinopathy or No Retinopathy       Exam did show retinopathy    Exam did not show retinopathy       Comments __________________________________________________________    Practice Providing Exam ______________________________________________    Exam Performed By (print name) _______________________________________      Provider Signature ___________________________________________________      These reports are needed for  compliance  Please fax this completed form and a copy of the Diabetic Eye Exam report to our office located at [place] [address] as soon as possible via Fax [phone] attention Elise Cranker: Phone [phone]  We thank you for your assistance in treating our mutual patient

## 2023-04-13 NOTE — LETTER
Diabetic Eye Exam Form    Date Requested: 23  Patient: Mckenzie Lujan  Patient : 1955   Referring Provider: Octaviano Sierra DO      DIABETIC Eye Exam Date _______________________________      Type of Exam MUST be documented for Diabetic Eye Exams  Please CHECK ONE  Retinal Exam       Dilated Retinal Exam       OCT       Optomap-Iris Exam      Fundus Photography       Left Eye - Please check Retinopathy or No Retinopathy        Exam did show retinopathy    Exam did not show retinopathy       Right Eye - Please check Retinopathy or No Retinopathy       Exam did show retinopathy    Exam did not show retinopathy       Comments __________________________________________________________    Practice Providing Exam ______________________________________________    Exam Performed By (print name) _______________________________________      Provider Signature ___________________________________________________      These reports are needed for  compliance  Please fax this completed form and a copy of the Diabetic Eye Exam report to our office located at [place] [address] as soon as possible via Fax [phone] jocelin Martínez: Phone [phone]  We thank you for your assistance in treating our mutual patient

## 2023-04-13 NOTE — TELEPHONE ENCOUNTER
Upon review of the In Basket request and the patient's chart, initial outreach has been made via fax to facility  Please see Contacts section for details       Thank you  Joey Robles

## 2023-04-13 NOTE — TELEPHONE ENCOUNTER
----- Message from Oma Tim DO sent at 4/12/2023  2:35 PM EDT -----  Regarding: care gap request  04/12/23 2:35 PM    Hello, our patient attached above has had Diabetic Eye Exam completed/performed  Please assist in updating the patient chart by making an External outreach to [facility]  The date of service is 2022      Thank you,  [facility]

## 2023-04-17 NOTE — TELEPHONE ENCOUNTER
As a follow-up, a second attempt has been made for outreach via fax to facility  Please see Contacts section for details      Thank you  Rahel Panchal

## 2023-04-24 NOTE — TELEPHONE ENCOUNTER
Upon review of the In Basket request we have found as a result of outreach that patient did not have the requested item(s) completed  Patient was last seen in 2019  Any additional questions or concerns should be emailed to the Practice Liaisons via the appropriate education email address, please do not reply via In Basket      Thank you  Sophia Jalloh

## 2023-05-01 DIAGNOSIS — J20.9 COPD (CHRONIC OBSTRUCTIVE PULMONARY DISEASE) WITH ACUTE BRONCHITIS (HCC): ICD-10-CM

## 2023-05-01 DIAGNOSIS — R52 PAIN: ICD-10-CM

## 2023-05-01 DIAGNOSIS — J44.0 COPD (CHRONIC OBSTRUCTIVE PULMONARY DISEASE) WITH ACUTE BRONCHITIS (HCC): ICD-10-CM

## 2023-05-01 RX ORDER — METHOCARBAMOL 750 MG/1
750 TABLET, FILM COATED ORAL EVERY 6 HOURS PRN
Qty: 30 TABLET | Refills: 0 | Status: SHIPPED | OUTPATIENT
Start: 2023-05-01

## 2023-05-01 RX ORDER — METHOCARBAMOL 750 MG/1
750 TABLET, FILM COATED ORAL EVERY 6 HOURS PRN
Qty: 30 TABLET | Refills: 0 | OUTPATIENT
Start: 2023-05-01

## 2023-05-01 RX ORDER — ALBUTEROL SULFATE 90 UG/1
2 AEROSOL, METERED RESPIRATORY (INHALATION) EVERY 6 HOURS PRN
Qty: 18 G | Refills: 0 | Status: SHIPPED | OUTPATIENT
Start: 2023-05-01

## 2023-05-03 ENCOUNTER — OFFICE VISIT (OUTPATIENT)
Dept: FAMILY MEDICINE CLINIC | Facility: CLINIC | Age: 68
End: 2023-05-03

## 2023-05-03 VITALS
HEART RATE: 62 BPM | SYSTOLIC BLOOD PRESSURE: 124 MMHG | DIASTOLIC BLOOD PRESSURE: 78 MMHG | OXYGEN SATURATION: 98 % | BODY MASS INDEX: 35.13 KG/M2 | TEMPERATURE: 97.8 F | RESPIRATION RATE: 16 BRPM | HEIGHT: 68 IN | WEIGHT: 231.8 LBS

## 2023-05-03 DIAGNOSIS — J44.1 COPD WITH ACUTE EXACERBATION (HCC): Primary | ICD-10-CM

## 2023-05-03 DIAGNOSIS — J06.9 UPPER RESPIRATORY TRACT INFECTION, UNSPECIFIED TYPE: ICD-10-CM

## 2023-05-03 DIAGNOSIS — R05.9 COUGH, UNSPECIFIED TYPE: ICD-10-CM

## 2023-05-03 LAB
SARS-COV-2 AG UPPER RESP QL IA: NEGATIVE
VALID CONTROL: NORMAL

## 2023-05-03 RX ORDER — GUAIFENESIN AND CODEINE PHOSPHATE 100; 10 MG/5ML; MG/5ML
5 SOLUTION ORAL 2 TIMES DAILY PRN
Qty: 118 ML | Refills: 0 | Status: SHIPPED | OUTPATIENT
Start: 2023-05-03

## 2023-05-03 RX ORDER — AZITHROMYCIN 250 MG/1
TABLET, FILM COATED ORAL
Qty: 6 TABLET | Refills: 0 | Status: SHIPPED | OUTPATIENT
Start: 2023-05-03 | End: 2023-05-07

## 2023-05-03 RX ORDER — PREDNISONE 10 MG/1
TABLET ORAL
Qty: 30 TABLET | Refills: 0 | Status: SHIPPED | OUTPATIENT
Start: 2023-05-03 | End: 2023-05-15

## 2023-05-03 NOTE — PROGRESS NOTES
NAME: Bushra Haas is a 79 y o  male  : 1955    MRN: [id number]  DATE: May 3, 2023  TIME: 12:03 PM    Assessment and Plan   Diagnoses and all orders for this visit:    COPD with acute exacerbation (HCC)  -     predniSONE 10 mg tablet; Take 4 tablets (40 mg total) by mouth daily for 3 days, THEN 3 tablets (30 mg total) daily for 3 days, THEN 2 tablets (20 mg total) daily for 3 days, THEN 1 tablet (10 mg total) daily for 3 days  -     azithromycin (ZITHROMAX) 250 mg tablet; Take 2 tablets today then 1 tablet daily x 4 days    Upper respiratory tract infection, unspecified type  -     POCT Rapid Covid Ag    Cough, unspecified type  -     guaifenesin-codeine (GUAIFENESIN AC) 100-10 MG/5ML liquid; Take 5 mL by mouth 2 (two) times a day as needed for cough      Patient call with any questions, concerns, persistent or worsening symptoms  Rapid COVID reviewed and was negative  Chief Complaint     Chief Complaint   Patient presents with    Cough    chest congestion    Nasal Congestion    Headache    Fatigue    Shortness of Breath    Generalized Body Aches         History of Present Illness       HPI  70-year-old male here today for sick visit  Patient with history of COPD of unclear severity  Patient complaining of cough which is productive, chest congestion, nasal congestion, headache, fatigue, dyspnea and some body aches  Symptoms for past several days  Patient noted some chest tightness and wheezing as well  Denies any fevers or chills  Denies any abdominal pain, GI symptoms, chest pain  Denies any sinus pain or pressure denies any ear pain, denies any sore throat      Review of Systems   Review of Systems  As per HPI  All other systems negative    Current Medications       Current Outpatient Medications:     albuterol (ProAir HFA) 90 mcg/act inhaler, Inhale 2 puffs every 6 (six) hours as needed for wheezing, Disp: 18 g, Rfl: 0    atenolol (TENORMIN) 25 mg tablet, Take 1 tablet by mouth daily, Disp: , Rfl: 1    azithromycin (ZITHROMAX) 250 mg tablet, Take 2 tablets today then 1 tablet daily x 4 days, Disp: 6 tablet, Rfl: 0    Blood Glucose Monitoring Suppl (RUBINA CONTOUR MONITOR) MIGUEL ANGEL, by Does not apply route 2 (two) times a day, Disp: 1 Device, Rfl: 0    Blood Glucose Monitoring Suppl (BLOOD GLUCOSE MONITOR SYSTEM) w/Device KIT, by Does not apply route 2 (two) times a day, Disp: 100 each, Rfl: 3    clindamycin (CLEOCIN) 150 mg capsule, Take 150 mg by mouth every 6 (six) hours, Disp: , Rfl:     clopidogrel (PLAVIX) 75 mg tablet, Take 1 tablet (75 mg total) by mouth daily, Disp: 90 tablet, Rfl: 1    ezetimibe-simvastatin (VYTORIN) 10-40 mg per tablet, Take 1 tablet by mouth daily, Disp: , Rfl:     gabapentin (NEURONTIN) 400 mg capsule, Take 1 PO TID , Disp: 270 capsule, Rfl: 1    glimepiride (AMARYL) 1 mg tablet, Take 1 tablet (1 mg total) by mouth 2 (two) times a day, Disp: 180 tablet, Rfl: 0    guaifenesin-codeine (GUAIFENESIN AC) 100-10 MG/5ML liquid, Take 5 mL by mouth 2 (two) times a day as needed for cough, Disp: 118 mL, Rfl: 0    HYDROcodone-acetaminophen (NORCO) 5-325 mg per tablet, Take 1 tablet by mouth every 6 (six) hours as needed for pain Max Daily Amount: 4 tablets, Disp: 30 tablet, Rfl: 0    methocarbamol (ROBAXIN) 750 mg tablet, Take 1 tablet (750 mg total) by mouth every 6 (six) hours as needed for muscle spasms, Disp: 30 tablet, Rfl: 0    nitroglycerin (NITROLINGUAL) 0 4 mg/spray spray, PLACE 1 SPRAY UNDER THE TONGUE EVERY 5 MINUTES FOR A MAXIMUM OF 3 DOSES AS DIRECTED FOR CHEST PAIN, Disp: , Rfl:     predniSONE 10 mg tablet, Take 4 tablets (40 mg total) by mouth daily for 3 days, THEN 3 tablets (30 mg total) daily for 3 days, THEN 2 tablets (20 mg total) daily for 3 days, THEN 1 tablet (10 mg total) daily for 3 days  , Disp: 30 tablet, Rfl: 0    QUEtiapine (SEROquel) 100 mg tablet, Take 1 tablet by mouth daily, Disp: , Rfl:     sildenafil (VIAGRA) 100 mg tablet, as "needed , Disp: , Rfl: 1    SITagliptin-metFORMIN HCl ER (Janumet XR) 100-1000 MG TB24, Take 1 tablet by mouth daily with dinner, Disp: 30 tablet, Rfl: 0    Current Allergies     Allergies as of 05/03/2023 - Reviewed 05/03/2023   Allergen Reaction Noted    Penicillins Other (See Comments)             The following portions of the patient's history were reviewed and updated as appropriate: allergies, current medications, past family history, past medical history, past social history, past surgical history and problem list      Past Medical History:   Diagnosis Date    Allergic 1961    COPD [facility]chronic obstructive pulmonary disease) ([facility] [address] ) 2005    Diabetes mellitus ([facility] ) 2008    Hypertension 2005    Myocardial infarction ([facility] ) 2005    Old myocardial infarction     Tear of medial meniscus of left knee, subsequent encounter     RESOLVE: [id number]       Past Surgical History:   Procedure Laterality Date    APPENDECTOMY      CORONARY ANGIOPLASTY      CORONARY ARTERY BYPASS GRAFT      KNEE ARTHROSCOPY      KNEE SURGERY Right     TONSILLECTOMY AND ADENOIDECTOMY      UMBILICAL HERNIA REPAIR         Family History   Adopted: Yes         Medications have been verified  Objective   /78 (BP Location: Left arm, Patient Position: Sitting, Cuff Size: Large)   Pulse 62   Temp 97 8 °F (36 6 °C)   Resp 16   Ht 5' 8 3\" (1 735 m)   Wt 105 kg (231 lb 12 8 oz)   SpO2 98%   BMI 34 94 kg/m²        Physical Exam     Physical Exam  Vitals and nursing note reviewed  Constitutional:       General: He is not in acute distress  Appearance: He is not ill-appearing, toxic-appearing or diaphoretic  HENT:      Head: Normocephalic and atraumatic  Mouth/Throat:      Mouth: Mucous membranes are moist       Pharynx: Oropharynx is clear  Eyes:      Pupils: Pupils are equal, round, and reactive to light  Neck:      Trachea: No tracheal deviation     Cardiovascular:      Rate and Rhythm: Normal rate and regular " rhythm  Pulmonary:      Effort: Pulmonary effort is normal       Breath sounds: Wheezing present  No decreased breath sounds or rales  Chest:      Chest wall: No tenderness  Abdominal:      Palpations: Abdomen is soft  Tenderness: There is no abdominal tenderness  Musculoskeletal:      Cervical back: Neck supple  Comments: Trace bilateral lower extremity edema   Lymphadenopathy:      Cervical: No cervical adenopathy  Neurological:      General: No focal deficit present  Mental Status: He is alert and oriented to person, place, and time     Psychiatric:         Mood and Affect: Mood normal

## 2023-05-16 DIAGNOSIS — R52 PAIN: ICD-10-CM

## 2023-05-16 DIAGNOSIS — M54.50 LOW BACK PAIN WITHOUT SCIATICA, UNSPECIFIED BACK PAIN LATERALITY, UNSPECIFIED CHRONICITY: ICD-10-CM

## 2023-05-16 DIAGNOSIS — E11.8 TYPE 2 DIABETES MELLITUS WITH COMPLICATION, WITHOUT LONG-TERM CURRENT USE OF INSULIN (HCC): ICD-10-CM

## 2023-05-16 RX ORDER — HYDROCODONE BITARTRATE AND ACETAMINOPHEN 5; 325 MG/1; MG/1
1 TABLET ORAL EVERY 6 HOURS PRN
Qty: 30 TABLET | Refills: 0 | Status: SHIPPED | OUTPATIENT
Start: 2023-05-16 | End: 2023-05-19 | Stop reason: SDUPTHER

## 2023-05-16 RX ORDER — METHOCARBAMOL 750 MG/1
750 TABLET, FILM COATED ORAL EVERY 6 HOURS PRN
Qty: 30 TABLET | Refills: 0 | Status: SHIPPED | OUTPATIENT
Start: 2023-05-16

## 2023-05-16 RX ORDER — SITAGLIPTIN AND METFORMIN HYDROCHLORIDE 1000; 100 MG/1; MG/1
1 TABLET, FILM COATED, EXTENDED RELEASE ORAL
Qty: 30 TABLET | Refills: 0 | Status: SHIPPED | OUTPATIENT
Start: 2023-05-16

## 2023-05-19 DIAGNOSIS — M54.50 LOW BACK PAIN WITHOUT SCIATICA, UNSPECIFIED BACK PAIN LATERALITY, UNSPECIFIED CHRONICITY: ICD-10-CM

## 2023-05-19 RX ORDER — HYDROCODONE BITARTRATE AND ACETAMINOPHEN 5; 325 MG/1; MG/1
1 TABLET ORAL EVERY 6 HOURS PRN
Qty: 30 TABLET | Refills: 0 | OUTPATIENT
Start: 2023-05-19

## 2023-05-19 RX ORDER — HYDROCODONE BITARTRATE AND ACETAMINOPHEN 5; 325 MG/1; MG/1
1 TABLET ORAL EVERY 6 HOURS PRN
Qty: 30 TABLET | Refills: 0 | Status: SHIPPED | OUTPATIENT
Start: 2023-05-19

## 2023-06-12 DIAGNOSIS — R52 PAIN: ICD-10-CM

## 2023-06-12 RX ORDER — METHOCARBAMOL 750 MG/1
750 TABLET, FILM COATED ORAL EVERY 6 HOURS PRN
Qty: 30 TABLET | Refills: 0 | Status: SHIPPED | OUTPATIENT
Start: 2023-06-12

## 2023-06-14 DIAGNOSIS — E11.8 TYPE 2 DIABETES MELLITUS WITH COMPLICATION, WITHOUT LONG-TERM CURRENT USE OF INSULIN (HCC): ICD-10-CM

## 2023-06-14 RX ORDER — SITAGLIPTIN AND METFORMIN HYDROCHLORIDE 1000; 100 MG/1; MG/1
TABLET, FILM COATED, EXTENDED RELEASE ORAL
Qty: 30 TABLET | Refills: 0 | Status: SHIPPED | OUTPATIENT
Start: 2023-06-14

## 2023-06-27 DIAGNOSIS — M54.50 LOW BACK PAIN WITHOUT SCIATICA, UNSPECIFIED BACK PAIN LATERALITY, UNSPECIFIED CHRONICITY: ICD-10-CM

## 2023-06-27 RX ORDER — HYDROCODONE BITARTRATE AND ACETAMINOPHEN 5; 325 MG/1; MG/1
1 TABLET ORAL EVERY 6 HOURS PRN
Qty: 30 TABLET | Refills: 0 | Status: SHIPPED | OUTPATIENT
Start: 2023-06-27

## 2023-07-07 DIAGNOSIS — J44.0 COPD (CHRONIC OBSTRUCTIVE PULMONARY DISEASE) WITH ACUTE BRONCHITIS (HCC): ICD-10-CM

## 2023-07-07 DIAGNOSIS — R52 PAIN: ICD-10-CM

## 2023-07-07 DIAGNOSIS — J20.9 COPD (CHRONIC OBSTRUCTIVE PULMONARY DISEASE) WITH ACUTE BRONCHITIS (HCC): ICD-10-CM

## 2023-07-07 RX ORDER — ALBUTEROL SULFATE 90 UG/1
2 AEROSOL, METERED RESPIRATORY (INHALATION) EVERY 6 HOURS PRN
Qty: 18 G | Refills: 0 | Status: SHIPPED | OUTPATIENT
Start: 2023-07-07

## 2023-07-07 RX ORDER — METHOCARBAMOL 750 MG/1
750 TABLET, FILM COATED ORAL EVERY 6 HOURS PRN
Qty: 30 TABLET | Refills: 0 | Status: SHIPPED | OUTPATIENT
Start: 2023-07-07

## 2023-07-09 DIAGNOSIS — E11.8 TYPE 2 DIABETES MELLITUS WITH COMPLICATION, WITHOUT LONG-TERM CURRENT USE OF INSULIN (HCC): ICD-10-CM

## 2023-07-09 RX ORDER — SITAGLIPTIN AND METFORMIN HYDROCHLORIDE 1000; 100 MG/1; MG/1
TABLET, FILM COATED, EXTENDED RELEASE ORAL
Qty: 30 TABLET | Refills: 0 | Status: SHIPPED | OUTPATIENT
Start: 2023-07-09

## 2023-07-19 DIAGNOSIS — E11.8 TYPE 2 DIABETES MELLITUS WITH COMPLICATION, WITHOUT LONG-TERM CURRENT USE OF INSULIN (HCC): ICD-10-CM

## 2023-07-19 RX ORDER — GLIMEPIRIDE 1 MG/1
TABLET ORAL
Qty: 180 TABLET | Refills: 0 | Status: SHIPPED | OUTPATIENT
Start: 2023-07-19

## 2023-07-27 DIAGNOSIS — J44.0 COPD (CHRONIC OBSTRUCTIVE PULMONARY DISEASE) WITH ACUTE BRONCHITIS (HCC): ICD-10-CM

## 2023-07-27 DIAGNOSIS — J20.9 COPD (CHRONIC OBSTRUCTIVE PULMONARY DISEASE) WITH ACUTE BRONCHITIS (HCC): ICD-10-CM

## 2023-07-27 RX ORDER — ALBUTEROL SULFATE 90 UG/1
AEROSOL, METERED RESPIRATORY (INHALATION)
Qty: 18 G | Refills: 0 | Status: SHIPPED | OUTPATIENT
Start: 2023-07-27

## 2023-08-08 DIAGNOSIS — M54.50 LOW BACK PAIN WITHOUT SCIATICA, UNSPECIFIED BACK PAIN LATERALITY, UNSPECIFIED CHRONICITY: ICD-10-CM

## 2023-08-08 DIAGNOSIS — R52 PAIN: ICD-10-CM

## 2023-08-08 RX ORDER — METHOCARBAMOL 750 MG/1
750 TABLET, FILM COATED ORAL EVERY 6 HOURS PRN
Qty: 30 TABLET | Refills: 0 | Status: SHIPPED | OUTPATIENT
Start: 2023-08-08 | End: 2023-08-29 | Stop reason: SDUPTHER

## 2023-08-08 RX ORDER — HYDROCODONE BITARTRATE AND ACETAMINOPHEN 5; 325 MG/1; MG/1
1 TABLET ORAL EVERY 6 HOURS PRN
Qty: 30 TABLET | Refills: 0 | Status: SHIPPED | OUTPATIENT
Start: 2023-08-08 | End: 2023-08-29 | Stop reason: SDUPTHER

## 2023-08-09 DIAGNOSIS — E11.8 TYPE 2 DIABETES MELLITUS WITH COMPLICATION, WITHOUT LONG-TERM CURRENT USE OF INSULIN (HCC): ICD-10-CM

## 2023-08-09 RX ORDER — SITAGLIPTIN AND METFORMIN HYDROCHLORIDE 1000; 100 MG/1; MG/1
TABLET, FILM COATED, EXTENDED RELEASE ORAL
Qty: 30 TABLET | Refills: 0 | Status: SHIPPED | OUTPATIENT
Start: 2023-08-09

## 2023-08-14 ENCOUNTER — OFFICE VISIT (OUTPATIENT)
Dept: PAIN MEDICINE | Facility: CLINIC | Age: 68
End: 2023-08-14
Payer: MEDICARE

## 2023-08-14 VITALS
BODY MASS INDEX: 34.86 KG/M2 | HEIGHT: 68 IN | DIASTOLIC BLOOD PRESSURE: 70 MMHG | TEMPERATURE: 97.8 F | SYSTOLIC BLOOD PRESSURE: 121 MMHG | WEIGHT: 230 LBS

## 2023-08-14 DIAGNOSIS — M54.16 LUMBAR RADICULOPATHY: ICD-10-CM

## 2023-08-14 DIAGNOSIS — M48.061 LUMBAR FORAMINAL STENOSIS: ICD-10-CM

## 2023-08-14 DIAGNOSIS — M16.12 LOCALIZED OSTEOARTHROSIS OF LEFT HIP: ICD-10-CM

## 2023-08-14 DIAGNOSIS — G89.4 CHRONIC PAIN SYNDROME: Primary | ICD-10-CM

## 2023-08-14 DIAGNOSIS — M51.27 HERNIATED NUCLEUS PULPOSUS, L5-S1: ICD-10-CM

## 2023-08-14 DIAGNOSIS — M47.816 LUMBAR SPONDYLOSIS: ICD-10-CM

## 2023-08-14 DIAGNOSIS — M25.552 LEFT HIP PAIN: ICD-10-CM

## 2023-08-14 DIAGNOSIS — M54.40 LOW BACK PAIN WITH SCIATICA, SCIATICA LATERALITY UNSPECIFIED, UNSPECIFIED BACK PAIN LATERALITY, UNSPECIFIED CHRONICITY: ICD-10-CM

## 2023-08-14 PROCEDURE — 99214 OFFICE O/P EST MOD 30 MIN: CPT | Performed by: NURSE PRACTITIONER

## 2023-08-14 RX ORDER — GABAPENTIN 400 MG/1
CAPSULE ORAL
Qty: 270 CAPSULE | Refills: 1 | Status: SHIPPED | OUTPATIENT
Start: 2023-08-14

## 2023-08-14 NOTE — PROGRESS NOTES
Assessment:  1. Chronic pain syndrome    2. Low back pain with sciatica, sciatica laterality unspecified, unspecified back pain laterality, unspecified chronicity    3. Lumbar spondylosis    4. Lumbar foraminal stenosis    5. Localized osteoarthrosis of left hip        Plan:    The patient is a 79 y.o. male last seen on 03/01/2023 who presents for a follow up office visit in regards to chronic pain secondary to low back pain, lumbar stenosis,  lumbar herniation with radiculopathy, and left hip pain due to osetoarthritis. The patient presents today with ongoing low back pain. The pain has been stable since the last office visit. He continues to take gabapentin 400 mg 1 tablet 3 times a day which is providing 50% pain relief without side effects. Therefore, I will continue him on the medication as prescribed. Refills were sent to the pharmacy        The patient will follow-up in 6 months for medication prescription refill and reevaluation. The patient was advised to contact the office should their symptoms worsen in the interim. The patient was agreeable and verbalized an understanding. History of Present Illness: The patient is a 79 y.o. male last seen on 03/01/2023 who presents for a follow up office visit in regards to chronic pain secondary to low back pain, lumbar stenosis,  lumbar herniation with radiculopathy, and left hip pain due to osetoarthritis. He presents today with ongoing low back pain. The pain occurs on occasion, but mostly at night. He states the pain is located mostly on the right side of the low back without leg pain. The pain radiates to the left buttock at times. He denies weakness in his legs. The pain is described as burning. He is rating his pain a 5/10 on the numeric rating scale. He is taking gabapentin 400 mg 1 tab 3 time a day. He is receiving 50% pain relief without side effects.      I have personally reviewed and/or updated the patient's past medical history, past surgical history, family history, social history, current medications, allergies, and vital signs today. Review of Systems:    Review of Systems   Musculoskeletal: Positive for gait problem.          Past Medical History:   Diagnosis Date   • Allergic    • COPD (chronic obstructive pulmonary disease) ([address])    • Diabetes mellitus ([address])    • Hypertension    • Myocardial infarction ([facility])    • Old myocardial infarction    • Tear of medial meniscus of left knee, subsequent encounter     RESOLVE: [id number]       Past Surgical History:   Procedure Laterality Date   • APPENDECTOMY     • CORONARY ANGIOPLASTY     • CORONARY ARTERY BYPASS GRAFT     • KNEE ARTHROSCOPY     • KNEE SURGERY Right    • TONSILLECTOMY AND ADENOIDECTOMY     • UMBILICAL HERNIA REPAIR         Family History   Adopted: Yes       Social History     Occupational History   • Occupation: WORKING FULL TIME    Tobacco Use   • Smoking status: Former     Packs/day: 1.00     Types: Cigarettes     Start date: 1986     Quit date: 10/2/2013     Years since quittin.8     Passive exposure: Current   • Smokeless tobacco: Never   • Tobacco comments:     FORMER SMOKER AS PER ALL SCRIPTS    Vaping Use   • Vaping Use: Never used   Substance and Sexual Activity   • Alcohol use: Yes     Comment: rare   • Drug use: No     Comment: Occasional   • Sexual activity: Yes     Partners: Female     Birth control/protection: None         Current Outpatient Medications:   •  albuterol (PROVENTIL HFA,VENTOLIN HFA) 90 mcg/act inhaler, INHALE 2 PUFFS EVERY 6 HOURS AS NEEDED FOR WHEEZING, Disp: 18 g, Rfl: 0  •  atenolol (TENORMIN) 25 mg tablet, Take 1 tablet by mouth daily, Disp: , Rfl: 1  •  Blood Glucose Monitoring Suppl (RUBINA CONTOUR MONITOR) MIGUEL ANGEL, by Does not apply route 2 (two) times a day, Disp: 1 Device, Rfl: 0  •  Blood Glucose Monitoring Suppl (BLOOD GLUCOSE MONITOR SYSTEM) w/Device KIT, by Does not apply route 2 (two) times a day, Disp: 100 each, Rfl: 3  •  clindamycin (CLEOCIN) 150 mg capsule, Take 150 mg by mouth every 6 (six) hours, Disp: , Rfl:   •  clopidogrel (PLAVIX) 75 mg tablet, Take 1 tablet (75 mg total) by mouth daily, Disp: 90 tablet, Rfl: 1  •  ezetimibe-simvastatin (VYTORIN) 10-40 mg per tablet, Take 1 tablet by mouth daily, Disp: , Rfl:   •  gabapentin (NEURONTIN) 400 mg capsule, Take 1 PO TID., Disp: 270 capsule, Rfl: 1  •  glimepiride (AMARYL) 1 mg tablet, TAKE 1 TABLET BY MOUTH TWICE A DAY, Disp: 180 tablet, Rfl: 0  •  guaifenesin-codeine (GUAIFENESIN AC) 100-10 MG/5ML liquid, Take 5 mL by mouth 2 (two) times a day as needed for cough, Disp: 118 mL, Rfl: 0  •  HYDROcodone-acetaminophen (NORCO) 5-325 mg per tablet, Take 1 tablet by mouth every 6 (six) hours as needed for pain Max Daily Amount: 4 tablets, Disp: 30 tablet, Rfl: 0  •  Janumet -1000 MG TB24, TAKE 1 TABLET BY MOUTH EVERY DAY WITH DINNER, Disp: 30 tablet, Rfl: 0  •  methocarbamol (ROBAXIN) 750 mg tablet, Take 1 tablet (750 mg total) by mouth every 6 (six) hours as needed for muscle spasms, Disp: 30 tablet, Rfl: 0  •  nitroglycerin (NITROLINGUAL) 0.4 mg/spray spray, PLACE 1 SPRAY UNDER THE TONGUE EVERY 5 MINUTES FOR A MAXIMUM OF 3 DOSES AS DIRECTED FOR CHEST PAIN, Disp: , Rfl:   •  QUEtiapine (SEROquel) 100 mg tablet, Take 1 tablet by mouth daily, Disp: , Rfl:   •  sildenafil (VIAGRA) 100 mg tablet, as needed , Disp: , Rfl: 1    Allergies   Allergen Reactions   • Penicillins Other (See Comments)     hives-emily cefazolin       Physical Exam:    There were no vitals taken for this visit. Constitutional:normal, well developed, well nourished, alert, in no distress and non-toxic and no overt pain behavior.   Eyes:anicteric  HEENT:grossly intact  Neck:supple, symmetric, trachea midline and no masses   Pulmonary:even and unlabored  Cardiovascular:No edema or pitting edema present  Skin:Normal without rashes or lesions and well hydrated  Psychiatric:Mood and affect appropriate  Neurologic:Cranial Nerves II-XII grossly intact  Musculoskeletal:normal     Motor Strength:  Left hip flexion:  5/5  Right hip flexion:  5/5  Left knee flexion:  5/5  Left knee extension:  5/5  Right knee flexion:  5/5  Right knee extension:  5/5  Left foot dorsiflexion:  5/5  Left foot plantar flexion:  5/5  Right foot dorsiflexion:  5/5  Right foot plantar flexion:  5/5        Imaging    CT LUMBAR SPINE     INDICATION:   M25.552: Pain in left hip  M54.40: Lumbago with sciatica, unspecified side  M54.16: Radiculopathy, lumbar region.     COMPARISON: 10/22/2020.     TECHNIQUE:  Contiguous axial images through the lumbar spine were obtained. Sagittal and coronal reconstructions were performed.       Radiation dose length product (DLP) for this visit:  4196 mGy-cm . This examination, like all CT scans performed in the [facility], was performed utilizing techniques to minimize radiation dose exposure, including the use of iterative   reconstruction and automated exposure control.       IMAGE QUALITY:  Diagnostic.     FINDINGS:     ALIGNMENT:  There are 5 lumbar type vertebral bodies. No significant spondylolisthesis.     VERTEBRAL BODIES:  Stable anterior wedging of the T11-L1 vertebral bodies with Schmorl's nodes along the superior endplates. No acute vertebral body fractures identified.     DEGENERATIVE CHANGES:     Lower Thoracic spine:  Normal lower thoracic disc spaces. ]     L1-2:  Vacuum disc phenomenon. Disc space degeneration and narrowing. Dorsal osteophytosis. Facet arthrosis. No significant canal stenosis or foraminal narrowing.     L2-3:  No significant canal stenosis or foraminal narrowing.     L3-4:  No significant canal stenosis or foraminal narrowing. Facet arthrosis.     L4-5:  Minimal bulge. Facet arthrosis. No significant canal stenosis or foraminal narrowing.     L5-S1:  Disc space narrowing. Vacuum disc phenomenon. Ventral and dorsal osteophytosis. Marginal osteophytosis is present. Facet arthrosis. Severe left and mild right foraminal narrowing.     PARASPINAL SOFT TISSUES:  Right adrenal gland adenoma again noted.     IMPRESSION:     Multilevel degenerative changes of the lumbar spine, as described above. Severe left foraminal narrowing is noted at L5-S1.     Stable chronic anterior wedging of the T11-L1 vertebral bodies.     No orders to display         No orders of the defined types were placed in this encounter.

## 2023-08-29 DIAGNOSIS — M54.50 LOW BACK PAIN WITHOUT SCIATICA, UNSPECIFIED BACK PAIN LATERALITY, UNSPECIFIED CHRONICITY: ICD-10-CM

## 2023-08-29 DIAGNOSIS — R52 PAIN: ICD-10-CM

## 2023-08-29 RX ORDER — HYDROCODONE BITARTRATE AND ACETAMINOPHEN 5; 325 MG/1; MG/1
1 TABLET ORAL EVERY 6 HOURS PRN
Qty: 30 TABLET | Refills: 0 | Status: SHIPPED | OUTPATIENT
Start: 2023-08-29

## 2023-08-29 RX ORDER — METHOCARBAMOL 750 MG/1
750 TABLET, FILM COATED ORAL EVERY 6 HOURS PRN
Qty: 30 TABLET | Refills: 0 | Status: SHIPPED | OUTPATIENT
Start: 2023-08-29

## 2023-08-30 DIAGNOSIS — J20.9 COPD (CHRONIC OBSTRUCTIVE PULMONARY DISEASE) WITH ACUTE BRONCHITIS (HCC): ICD-10-CM

## 2023-08-30 DIAGNOSIS — J44.0 COPD (CHRONIC OBSTRUCTIVE PULMONARY DISEASE) WITH ACUTE BRONCHITIS (HCC): ICD-10-CM

## 2023-08-30 RX ORDER — ALBUTEROL SULFATE 90 UG/1
AEROSOL, METERED RESPIRATORY (INHALATION)
Qty: 18 G | Refills: 0 | Status: SHIPPED | OUTPATIENT
Start: 2023-08-30

## 2023-09-03 DIAGNOSIS — E11.8 TYPE 2 DIABETES MELLITUS WITH COMPLICATION, WITHOUT LONG-TERM CURRENT USE OF INSULIN (HCC): ICD-10-CM

## 2023-09-03 RX ORDER — GLIMEPIRIDE 1 MG/1
TABLET ORAL
Qty: 180 TABLET | Refills: 0 | Status: SHIPPED | OUTPATIENT
Start: 2023-09-03

## 2023-09-03 RX ORDER — SITAGLIPTIN AND METFORMIN HYDROCHLORIDE 1000; 100 MG/1; MG/1
TABLET, FILM COATED, EXTENDED RELEASE ORAL
Qty: 30 TABLET | Refills: 0 | Status: SHIPPED | OUTPATIENT
Start: 2023-09-03

## 2023-09-19 ENCOUNTER — OFFICE VISIT (OUTPATIENT)
Dept: SLEEP CENTER | Facility: HOSPITAL | Age: 68
End: 2023-09-19
Payer: MEDICARE

## 2023-09-19 VITALS
SYSTOLIC BLOOD PRESSURE: 120 MMHG | WEIGHT: 230 LBS | DIASTOLIC BLOOD PRESSURE: 68 MMHG | OXYGEN SATURATION: 96 % | HEART RATE: 62 BPM | BODY MASS INDEX: 32.93 KG/M2 | HEIGHT: 70 IN

## 2023-09-19 DIAGNOSIS — R45.86 MOOD DISTURBANCE: ICD-10-CM

## 2023-09-19 DIAGNOSIS — G89.4 CHRONIC PAIN DISORDER: ICD-10-CM

## 2023-09-19 DIAGNOSIS — E11.9 CONTROLLED TYPE 2 DIABETES MELLITUS WITHOUT COMPLICATION, WITHOUT LONG-TERM CURRENT USE OF INSULIN (HCC): ICD-10-CM

## 2023-09-19 DIAGNOSIS — G47.33 OBSTRUCTIVE SLEEP APNEA: Primary | ICD-10-CM

## 2023-09-19 DIAGNOSIS — J44.0 COPD (CHRONIC OBSTRUCTIVE PULMONARY DISEASE) WITH ACUTE BRONCHITIS: ICD-10-CM

## 2023-09-19 DIAGNOSIS — J20.9 COPD (CHRONIC OBSTRUCTIVE PULMONARY DISEASE) WITH ACUTE BRONCHITIS: ICD-10-CM

## 2023-09-19 DIAGNOSIS — E66.9 OBESITY (BMI 30-39.9): ICD-10-CM

## 2023-09-19 DIAGNOSIS — I10 ESSENTIAL HYPERTENSION: ICD-10-CM

## 2023-09-19 PROCEDURE — 99214 OFFICE O/P EST MOD 30 MIN: CPT | Performed by: INTERNAL MEDICINE

## 2023-09-19 NOTE — PROGRESS NOTES
Follow-Up Note - Sleep Center   Jagdish Odom  76 y.o. male  Selene Cabrera  RIJ:[id number]  DOS:9/19/2023    CC: I saw this patient for follow-up in clinic today for Sleep disordered breathing, Coexisting Sleep and Medical Problems. Interval changes: He is using a ResMed machine    A retitration study in 2018 demonstrated sleep disordered breathing was successfully remediated with PAP at 20/14 cm H2O. Mean oxygen saturation was 91% and there were some desaturations to a sonu of 86%. PFSH, Problem List, Medications & Allergies were reviewed in EMR. He  has a past medical history of Allergic (1961), COPD (chronic obstructive pulmonary disease) ([address]) (2005), Diabetes mellitus ([address]) (2008), Heart disease, Hypertension (2005), Myocardial infarction ([address]) (2005), Old myocardial infarction, and Tear of medial meniscus of left knee, subsequent encounter. He has a current medication list which includes the following prescription(s): albuterol, atenolol, velvet contour monitor, blood glucose monitor system, clindamycin, clopidogrel, ezetimibe-simvastatin, gabapentin, glimepiride, guaifenesin-codeine, hydrocodone-acetaminophen, janumet xr, methocarbamol, nitroglycerin, quetiapine, and sildenafil. PHYSIOLOGICAL DATA REVIEW : Using PAP > 4 hours/night 97%. Estimated BENJA 0.2/hour with pressure of 14.5cm [email or web address] percentile; patient has not been using non FDA approved devices to sanitize the machine. INTERPRETATION: Compliance is excellent; Pressure setting is:optimal; ;   SUBJECTIVE: With respect to use of PAP, Paula Cuellar  is experiencing no adverse effects:. He derives benefit. Is satisfied with sleep and daytime function. Sleep Routine: Paula Cuellar reports getting 8 hrs sleep; he has no difficulty initiating or maintaining sleep . He arises spontaneously and feels more refreshed since on Rx. Paula Cuellar denies daytime sleepiness,. He rated [himself] at Total score: 0 /24 on the Fleming Sleepiness Scale.    Other issues: None reported. Habits:[ reports that he quit smoking about 9 years ago. His smoking use included cigarettes. He started smoking about 37 years ago. He smoked an average of 1 pack per day. He has been exposed to tobacco smoke. He has never used smokeless tobacco.], [ reports current alcohol use.], [ reports no history of drug use.], Caffeine use:limited; Exercise routine: regular. ROS: Significant for weight has been stable. He reported no nasal symptoms. Respiratory symptoms are controlled. He reported no cardiac symptoms. Pain and mood are stable on current medications. Asuncion Jimenez EXAM: /68 (BP Location: Left arm, Patient Position: Sitting, Cuff Size: Standard)   Pulse 62   Ht 5' 10" (1.778 m)   Wt 104 kg (230 lb)   SpO2 96%   BMI 33.00 kg/m²     Wt Readings from Last 3 Encounters:   09/19/23 104 kg (230 lb)   08/14/23 104 kg (230 lb)   05/03/23 105 kg (231 lb 12.8 oz)      Patient is well groomed; well appearing. CNS: Alert, orientated, speech clear & coherent  Psych: cooperative and in no distress. Mental state: Appears normal.  H&N: EOMI; NC/AT: No facial pressure marks, no rashes. Skin/Extrem: col & hydration normal; no edema  Resp: Respiratory effort is normal  Physical findings otherwise essentially unchanged from previous. IMPRESSION: Problem List Items & Comorbidities Addressed this Visit    1. Obstructive sleep apnea        2. COPD (chronic obstructive pulmonary disease) with acute bronchitis ([address])        3. Essential hypertension        4. Mood disturbance        5. Chronic pain disorder        6. Obesity (BMI 30-39.9)        7. Controlled type 2 diabetes mellitus without complication, without long-term current use of insulin ([address])            PLAN:  1. I reviewed results of prior studies and physiologic data with the patient. 2. I discussed treatment options with risks and benefits. 3. Treatment with  PAP is medically necessary and Dene Hotter is agreable to continue use.    4. Care of equipment, methods to improve comfort using PAP and importance of compliance with therapy were discussed. 5. Pressure setting: continue BiPAP auto. 6. Rx provided to replace supplies and Care coordinated with DME provider. 7. Discussed strategies for weight reduction. 8. Follow-up is advised in 1 year or sooner if needed to monitor progress, compliance and to adjust therapy. Thank you for allowing me to participate in the care of this patient. Sincerely,     Authenticated electronically on 15/18/78   Board Certified Specialist     Portions of the record may have been created with voice recognition software. Occasional wrong word or "sound a like" substitutions may have occurred due to the inherent limitations of voice recognition software. There may also be notations and random deletions of words or characters from malfunctioning software. Read the chart carefully and recognize, using context, where substitutions/deletions have occurred.

## 2023-10-03 DIAGNOSIS — J20.9 COPD (CHRONIC OBSTRUCTIVE PULMONARY DISEASE) WITH ACUTE BRONCHITIS: ICD-10-CM

## 2023-10-03 DIAGNOSIS — J44.0 COPD (CHRONIC OBSTRUCTIVE PULMONARY DISEASE) WITH ACUTE BRONCHITIS: ICD-10-CM

## 2023-10-03 RX ORDER — ALBUTEROL SULFATE 90 UG/1
AEROSOL, METERED RESPIRATORY (INHALATION)
Qty: 18 G | Refills: 0 | Status: SHIPPED | OUTPATIENT
Start: 2023-10-03

## 2023-10-11 DIAGNOSIS — M54.50 LOW BACK PAIN WITHOUT SCIATICA, UNSPECIFIED BACK PAIN LATERALITY, UNSPECIFIED CHRONICITY: ICD-10-CM

## 2023-10-11 RX ORDER — HYDROCODONE BITARTRATE AND ACETAMINOPHEN 5; 325 MG/1; MG/1
1 TABLET ORAL EVERY 6 HOURS PRN
Qty: 30 TABLET | Refills: 0 | Status: SHIPPED | OUTPATIENT
Start: 2023-10-11

## 2023-10-20 ENCOUNTER — OFFICE VISIT (OUTPATIENT)
Dept: FAMILY MEDICINE CLINIC | Facility: CLINIC | Age: 68
End: 2023-10-20

## 2023-10-20 VITALS
HEIGHT: 70 IN | HEART RATE: 62 BPM | SYSTOLIC BLOOD PRESSURE: 128 MMHG | DIASTOLIC BLOOD PRESSURE: 72 MMHG | TEMPERATURE: 98 F | OXYGEN SATURATION: 98 % | RESPIRATION RATE: 16 BRPM | BODY MASS INDEX: 32.96 KG/M2 | WEIGHT: 230.2 LBS

## 2023-10-20 DIAGNOSIS — Z23 ENCOUNTER FOR IMMUNIZATION: ICD-10-CM

## 2023-10-20 DIAGNOSIS — R52 PAIN: ICD-10-CM

## 2023-10-20 DIAGNOSIS — E11.9 CONTROLLED TYPE 2 DIABETES MELLITUS WITHOUT COMPLICATION, WITHOUT LONG-TERM CURRENT USE OF INSULIN (HCC): ICD-10-CM

## 2023-10-20 DIAGNOSIS — Z12.5 SCREENING FOR PROSTATE CANCER: ICD-10-CM

## 2023-10-20 DIAGNOSIS — M25.59 PAIN IN OTHER SPECIFIED JOINT: ICD-10-CM

## 2023-10-20 DIAGNOSIS — E78.5 HYPERLIPIDEMIA, UNSPECIFIED HYPERLIPIDEMIA TYPE: ICD-10-CM

## 2023-10-20 DIAGNOSIS — Z23 NEED FOR INFLUENZA VACCINATION: Primary | ICD-10-CM

## 2023-10-20 DIAGNOSIS — G89.4 CHRONIC PAIN SYNDROME: ICD-10-CM

## 2023-10-20 DIAGNOSIS — Z79.899 HIGH RISK MEDICATION USE: ICD-10-CM

## 2023-10-20 RX ORDER — METHOCARBAMOL 750 MG/1
750 TABLET, FILM COATED ORAL EVERY 6 HOURS PRN
Qty: 30 TABLET | Refills: 3 | Status: SHIPPED | OUTPATIENT
Start: 2023-10-20

## 2023-10-20 NOTE — PATIENT INSTRUCTIONS
Goals of care:  Maximize your health and quality of life by: Increasing your level of function and activity  Decreasing the negative effects of pain on your life  Minimizing the risks and side effects of medications and ensuring safe use of opioid medication     Ways for you to help meet your goals:  Maintain a healthy lifestyle. This includes proper nutrition, regular physical activity as able, try for 8 hours of sleep per night, use stress reduction strategies, avoid triggers. Risks and side effects of opioid use:  Prescription opioids carry serious risks of addiction and  overdose, especially with prolonged use. An opioid overdose,  often marked by slowed breathing, can cause sudden death. The  use of prescription opioids can have a number of side effects as  well, even when taken as directed: Tolerance--meaning you might need to take more of a medication for the same pain relief  Physical dependence--meaning you have symptoms of withdrawal when a medication is stopped  Increased sensitivity to pain  Constipation  Nausea, vomiting, dry mouth  Sleepiness and dizziness   Confusion  Depression  Low levels of testosterone that can result in lower sex drive, energy, and strength  Itching and sweating    If you are prescribed opioids for pain:  Never take opioids in greater amounts or more often than prescribed. Help prevent misuse and abuse. - Never sell or share prescription opioids.        - Never use another person’s prescription opioids. ‡Store prescription opioids in a secure place and out of reach of others (this may include visitors, children, friends, and family). Safely dispose of unused prescription opioids: Find your community drug take-back program or your pharmacy mail-back program, or flush them down the toilet, following guidance from the Food and Drug Administration (www.fda.gov/Drugs/ResourcesForYou).   ‡Visit www.cdc.gov/drugoverdose to learn about the risks of opioid abuse and overdose. If you believe you may be struggling with addiction, tell your health care provider and ask for guidance or call [facility]’s National Helpline at 7-098-995-CXKY. 5

## 2023-10-20 NOTE — PROGRESS NOTES
Assessment/Plan     Problem List Items Addressed This Visit          Endocrine    Controlled type 2 diabetes mellitus without complication (HCC)    Relevant Orders    Comprehensive metabolic panel    Hemoglobin A1C    Albumin / creatinine urine ratio       Other    Chronic pain syndrome    Relevant Orders    Drug Screen Routine w Rizo Robert and Adulteration, urine. Hyperlipidemia    Relevant Orders    Lipid panel     Other Visit Diagnoses       Need for influenza vaccination    -  Primary    Relevant Orders    influenza vaccine, high-dose, PF 0.7 mL (FLUZONE HIGH-DOSE) (Completed)    Screening for prostate cancer        Relevant Orders    UA (URINE) with reflex to Scope    PSA, Total Screen    High risk medication use        Relevant Orders    CBC and differential    Comprehensive metabolic panel    UA (URINE) with reflex to Scope    Encounter for immunization        Relevant Orders    Pneumococcal Conjugate Vaccine 20-valent (Pcv20) (Completed)    Pain in other specified joint        Relevant Orders    Drug Screen Routine w Rizo Robert and Adulteration, urine. Labs ordered  Flu and Prevnar given  UDS ordered narcotic contract signed   his other chronic conditions are stable  He will continue his current medications  He will follow-up with me in 6 months or sooner if needed            Opioid Management Plan    Depression Screening and Follow-up Plan: Patient was screened for depression during today's encounter. They screened negative with a PHQ-2 score of 0. Subjective         Screening Tools/Assessments:    PHQ-2/9:  PHQ-2 score: 0  Last PHQ-2 score: 0 (Last PHQ-2 date: 5/3/2023)      Opioid agreement:  Active Opioid agreement on file?: No      Naloxone:  Currently prescribed Naloxone (Narcan): No      Patient is here for 6 med check of chronic conditions  He reports no acute complaints today      Pain Medications               gabapentin (NEURONTIN) 400 mg capsule Take 1 PO TID. HYDROcodone-acetaminophen (NORCO) 5-325 mg per tablet Take 1 tablet by mouth every 6 (six) hours as needed for pain Max Daily Amount: 4 tablets    QUEtiapine (SEROquel) 100 mg tablet Take 1 tablet by mouth daily           Outpatient Morphine Milligram Equivalents Per Day       10/20/23 and after 23 MME/Day      Order Name Dose Route Frequency Maximum MME/Day     guaifenesin-codeine (GUAIFENESIN AC) 100-10 MG/5ML liquid 5 mL Oral 2 times daily PRN 3 MME/Day     HYDROcodone-acetaminophen (NORCO) 5-325 mg per tablet 1 tablet Oral Every 6 hours PRN 20 MME/Day    Total Potential Morphine Milligram Equivalents Per Day 23 MME/Day      Calculation Information          guaifenesin-codeine (GUAIFENESIN AC) 100-10 MG/5ML liquid    guaifenesin-codeine 100-10 MG/5ML Syrp: single dose of 10 mg of opioid in combo * 2 doses per day * morphine equivalence factor of 0.15 = 3 MME/Day       HYDROcodone-acetaminophen (NORCO) 5-325 mg per tablet    HYDROcodone-acetaminophen 5-325 mg Tabs: maximum daily dose of 20 mg of opioid in combo * morphine equivalence factor of 1 = 20 MME/Day                                 PDMP Review         Value Time User    PDMP Reviewed  Yes 5/3/2023 10:44 AM Kassi Marshall PA-C           Review of Systems   Constitutional: Negative. HENT: Negative. Eyes: Negative. Respiratory: Negative. Cardiovascular: Negative. Gastrointestinal: Negative. Endocrine: Negative. Genitourinary: Negative. Musculoskeletal: Negative. Skin: Negative. Allergic/Immunologic: Negative. Neurological: Negative. Hematological: Negative. Psychiatric/Behavioral: Negative. All other systems reviewed and are negative. Objective     /72 (BP Location: Right arm, Patient Position: Sitting, Cuff Size: Large)   Pulse 62   Temp 98 °F (36.7 °C)   Resp 16   Ht 5' 10" (1.778 m)   Wt 104 kg (230 lb 3.2 oz)   SpO2 98%   BMI 33.03 kg/m²     Physical Exam  Vitals and nursing note reviewed. Constitutional:       Appearance: Normal appearance. He is well-developed. HENT:      Head: Normocephalic. Right Ear: External ear normal.      Left Ear: External ear normal.      Nose: Nose normal.   Eyes:      Conjunctiva/sclera: Conjunctivae normal.      Pupils: Pupils are equal, round, and reactive to light. Cardiovascular:      Rate and Rhythm: Normal rate and regular rhythm. Heart sounds: Normal heart sounds. Pulmonary:      Effort: Pulmonary effort is normal.      Breath sounds: Normal breath sounds. Abdominal:      General: Bowel sounds are normal.      Palpations: Abdomen is soft. Musculoskeletal:         General: Normal range of motion. Cervical back: Normal range of motion and neck supple. Skin:     General: Skin is warm and dry. Neurological:      General: No focal deficit present. Mental Status: He is alert and oriented to person, place, and time. Psychiatric:         Behavior: Behavior normal.         Thought Content:  Thought content normal.         Judgment: Judgment normal.         Leo Bliss, DO

## 2023-10-20 NOTE — PROGRESS NOTES
Assessment/Plan:    No problem-specific Assessment & Plan notes found for this encounter. There are no diagnoses linked to this encounter. Subjective:     Chief Complaint   Patient presents with    Diabetes        Patient ID: Cassi Mccall is a 76 y.o. male.     HPI    The following portions of the patient's history were reviewed and updated as appropriate: allergies, current medications, past family history, past medical history, past social history, past surgical history and problem list.    Review of Systems      Objective:    Vitals:    10/20/23 1338   BP: 128/72   BP Location: Right arm   Patient Position: Sitting   Cuff Size: Large   Pulse: 62   Resp: 16   Temp: 98 °F (36.7 °C)   SpO2: 98%   Weight: 104 kg (230 lb 3.2 oz)   Height: 5' 10" (1.778 m)          Physical Exam

## 2023-10-24 ENCOUNTER — TELEPHONE (OUTPATIENT)
Dept: GASTROENTEROLOGY | Facility: CLINIC | Age: 68
End: 2023-10-24

## 2023-10-24 ENCOUNTER — CONSULT (OUTPATIENT)
Dept: GASTROENTEROLOGY | Facility: CLINIC | Age: 68
End: 2023-10-24
Payer: MEDICARE

## 2023-10-24 VITALS
SYSTOLIC BLOOD PRESSURE: 124 MMHG | WEIGHT: 230 LBS | BODY MASS INDEX: 32.93 KG/M2 | HEIGHT: 70 IN | DIASTOLIC BLOOD PRESSURE: 70 MMHG

## 2023-10-24 DIAGNOSIS — Z86.010 HISTORY OF COLON POLYPS: Primary | ICD-10-CM

## 2023-10-24 DIAGNOSIS — Z79.02 ANTIPLATELET OR ANTITHROMBOTIC LONG-TERM USE: ICD-10-CM

## 2023-10-24 PROCEDURE — 99203 OFFICE O/P NEW LOW 30 MIN: CPT | Performed by: INTERNAL MEDICINE

## 2023-10-24 NOTE — PROGRESS NOTES
[facility] - Outpatient Consultation  Reza Odom 76 y.o. male MRN: [id number]  Encounter: [id number]    ASSESSMENT AND PLAN:      1. History of colon polyps  Currently due for colorectal cancer screening with history of polyps. Last colonoscopy in 2017. On Plavix which she will need to hold for 5 days. Will need cardiac clearance prior to procedure. We will schedule the patient for colonoscopy and have advised the patient to take the bowel preparation in a split dose bowel preparation. I have discussed with the patient the risks and benefits and alternatives of the procedure which include but are not limited to bleeding, infection, aspiration, perforation.         - Colonoscopy; Future    2. Antiplatelet or antithrombotic long-term use  We will need to hold Plavix for 5 days prior to colonoscopy. Risks and benefits of holding the antiplatelet prior to the procedure discussed with the patient. Patient understands the risks of embolic events while holding the antiplatelet drug. Follow up Appointment: For colonoscopy    Chief Complaint   Patient presents with   • Colonoscopy clearance     Patient on Plavix       HPI:   Cassi Mccall is a 76y.o. year old male with a PMH significant for polyps, DEEPTHI, CAD status post CABG on Plavix, who presents for colorectal cancer screening. Last colonoscopy in 2017 with 2 subcentimeter polyps removed. Adenomas. Recall recommended in 5 years. Currently overdue. Currently denies any chest pain or shortness of breath at this time. Otherwise denies any GI complaints of nausea vomiting abdominal pain. No dysphagia. No change of bowel habits or GI bleeding. GI History:  Blood thinners: Plavix   NSAID use: Denies  Insulin use: None    Abdominal Surgical Hx: Appendectomy  Family Hx: Denies first degree relatives with GI malignancies. GI procedure Hx: Colonoscopy in 2017 with 2 subcentimeter polyps removed.         Historical Information   Past Medical History:   Diagnosis Date   • Allergic 1961   • COPD (chronic obstructive pulmonary disease) ([address]) 2005   • Coronary artery disease    • Diabetes mellitus ([address]) 2008   • Heart disease    • Hypertension 2005   • Myocardial infarction ([address]) 2005   • Old myocardial infarction    • Tear of medial meniscus of left knee, subsequent encounter     RESOLVE: [id number]     Past Surgical History:   Procedure Laterality Date   • APPENDECTOMY     • CORONARY ANGIOPLASTY     • CORONARY ARTERY BYPASS GRAFT     • KNEE ARTHROSCOPY     • KNEE SURGERY Right    • TONSILLECTOMY AND ADENOIDECTOMY     • UMBILICAL HERNIA REPAIR       Social History     Substance and Sexual Activity   Alcohol Use Not Currently    Comment: rare     Social History     Substance and Sexual Activity   Drug Use No    Comment: Occasional     Social History     Tobacco Use   Smoking Status Former   • Packs/day: 1.00   • Years: 25.00   • Total pack years: 25.00   • Types: Cigarettes   • Start date: 1/16/1986   • Quit date: 10/2/2013   • Years since quitting: 10.0   • Passive exposure: Current   Smokeless Tobacco Never   Tobacco Comments    FORMER SMOKER AS PER ALL SCRIPTS      Family History   Adopted: Yes       Meds/Allergies     Current Outpatient Medications:   •  albuterol (PROVENTIL HFA,VENTOLIN HFA) 90 mcg/act inhaler  •  atenolol (TENORMIN) 25 mg tablet  •  Blood Glucose Monitoring Suppl (RUBINA CONTOUR MONITOR) MIGUEL ANGEL  •  Blood Glucose Monitoring Suppl (BLOOD GLUCOSE MONITOR SYSTEM) w/Device KIT  •  clindamycin (CLEOCIN) 150 mg capsule  •  clopidogrel (PLAVIX) 75 mg tablet  •  ezetimibe-simvastatin (VYTORIN) 10-40 mg per tablet  •  gabapentin (NEURONTIN) 400 mg capsule  •  glimepiride (AMARYL) 1 mg tablet  •  guaifenesin-codeine (GUAIFENESIN AC) 100-10 MG/5ML liquid  •  HYDROcodone-acetaminophen (NORCO) 5-325 mg per tablet  •  Janumet -1000 MG TB24  •  methocarbamol (ROBAXIN) 750 mg tablet  •  nitroglycerin (NITROLINGUAL) 0.4 mg/spray spray  •  QUEtiapine (SEROquel) 100 mg tablet  •  sildenafil (VIAGRA) 100 mg tablet    Allergies   Allergen Reactions   • Penicillins Other (See Comments)     hives-emily cefazolin       PHYSICAL EXAM:    Blood pressure 124/70, height 5' 10" (1.778 m), weight 104 kg (230 lb). Body mass index is 33 kg/m². General Appearance: NAD, cooperative, alert  Eyes: Anicteric  GI:  Soft, non-tender, non-distended; normal bowel sounds; no masses, no organomegaly   Rectal: Deferred  Musculoskeletal: No edema. Skin:  No jaundice    Lab Results:   Lab Results   Component Value Date    WBC 10.00 08/31/2022    WBC 10.89 (H) 09/15/2020    WBC 9.14 10/12/2019    HGB 14.2 08/31/2022    HGB 14.8 09/15/2020    HGB 14.5 10/12/2019    MCV 97 08/31/2022     08/31/2022     09/15/2020     10/12/2019    INR 0.98 11/10/2015     Lab Results   Component Value Date     04/30/2016    K 4.8 08/31/2022     08/31/2022    CO2 30 08/31/2022    ANIONGAP 5 11/10/2015    BUN 13 08/31/2022    CREATININE 1.06 08/31/2022    GLUCOSE 174 (H) 04/30/2016    GLUF 133 (H) 08/31/2022    CALCIUM 9.8 08/31/2022    CORRECTEDCA 10.3 (H) 08/31/2022    AST 18 08/31/2022    AST 16 01/13/2021    AST 9 09/21/2020    ALT 39 08/31/2022    ALT 36 01/13/2021    ALT 31 09/21/2020    ALKPHOS 52 08/31/2022    ALKPHOS 54 01/13/2021    ALKPHOS 55 09/21/2020    PROT 18.9 (H) 04/30/2016    PROT 6.8 04/30/2016    BILITOT 0.3 04/30/2016    EGFR 72 08/31/2022     No results found for: "IRON", "TIBC", "FERRITIN"  No results found for: "LIPASE"    Radiology Results:   No results found. GBS in Previous Infant/ Premature Rupture of Membranes (PPROM)/Gestational Age less than 36 Weeks

## 2023-10-24 NOTE — TELEPHONE ENCOUNTER
Scheduled date of colonoscopy (as of today):12/18/23  Physician performing colonoscopy: Dr Gunner Jones  Location of colonoscopy: [facility] ([facility])  Bowel prep reviewed with patient: miralax  Instructions reviewed with patient by: gave patient instruction packet  Clearances: Niki, Dr Walter Heller

## 2023-10-26 DIAGNOSIS — J44.0 COPD (CHRONIC OBSTRUCTIVE PULMONARY DISEASE) WITH ACUTE BRONCHITIS: ICD-10-CM

## 2023-10-26 DIAGNOSIS — J20.9 COPD (CHRONIC OBSTRUCTIVE PULMONARY DISEASE) WITH ACUTE BRONCHITIS: ICD-10-CM

## 2023-10-26 RX ORDER — ALBUTEROL SULFATE 90 UG/1
2 AEROSOL, METERED RESPIRATORY (INHALATION) EVERY 6 HOURS PRN
Qty: 18 G | Refills: 0 | Status: SHIPPED | OUTPATIENT
Start: 2023-10-26

## 2023-11-03 DIAGNOSIS — M54.50 LOW BACK PAIN WITHOUT SCIATICA, UNSPECIFIED BACK PAIN LATERALITY, UNSPECIFIED CHRONICITY: ICD-10-CM

## 2023-11-03 DIAGNOSIS — R52 PAIN: ICD-10-CM

## 2023-11-03 RX ORDER — HYDROCODONE BITARTRATE AND ACETAMINOPHEN 5; 325 MG/1; MG/1
1 TABLET ORAL EVERY 6 HOURS PRN
Qty: 30 TABLET | Refills: 0 | Status: SHIPPED | OUTPATIENT
Start: 2023-11-03

## 2023-11-03 RX ORDER — METHOCARBAMOL 750 MG/1
750 TABLET, FILM COATED ORAL EVERY 6 HOURS PRN
Qty: 30 TABLET | Refills: 0 | Status: SHIPPED | OUTPATIENT
Start: 2023-11-03

## 2023-11-04 ENCOUNTER — APPOINTMENT (OUTPATIENT)
Dept: LAB | Facility: CLINIC | Age: 68
End: 2023-11-04
Payer: MEDICARE

## 2023-11-04 DIAGNOSIS — Z79.899 HIGH RISK MEDICATION USE: ICD-10-CM

## 2023-11-04 DIAGNOSIS — Z12.5 SCREENING FOR PROSTATE CANCER: ICD-10-CM

## 2023-11-04 DIAGNOSIS — E78.2 MIXED HYPERLIPIDEMIA: ICD-10-CM

## 2023-11-04 DIAGNOSIS — E78.5 HYPERLIPIDEMIA, UNSPECIFIED HYPERLIPIDEMIA TYPE: ICD-10-CM

## 2023-11-04 DIAGNOSIS — E11.9 CONTROLLED TYPE 2 DIABETES MELLITUS WITHOUT COMPLICATION, WITHOUT LONG-TERM CURRENT USE OF INSULIN (HCC): ICD-10-CM

## 2023-11-04 LAB
BASOPHILS # BLD AUTO: 0.11 THOUSANDS/ÂΜL (ref 0–0.1)
BASOPHILS NFR BLD AUTO: 1 % (ref 0–1)
BILIRUB UR QL STRIP: NEGATIVE
CLARITY UR: CLEAR
COLOR UR: NORMAL
CREAT UR-MCNC: 60.8 MG/DL
EOSINOPHIL # BLD AUTO: 0.47 THOUSAND/ÂΜL (ref 0–0.61)
EOSINOPHIL NFR BLD AUTO: 5 % (ref 0–6)
ERYTHROCYTE [DISTWIDTH] IN BLOOD BY AUTOMATED COUNT: 12.6 % (ref 11.6–15.1)
GLUCOSE UR STRIP-MCNC: NEGATIVE MG/DL
HCT VFR BLD AUTO: 47.8 % (ref 36.5–49.3)
HGB BLD-MCNC: 15.2 G/DL (ref 12–17)
HGB UR QL STRIP.AUTO: NEGATIVE
IMM GRANULOCYTES # BLD AUTO: 0.08 THOUSAND/UL (ref 0–0.2)
IMM GRANULOCYTES NFR BLD AUTO: 1 % (ref 0–2)
KETONES UR STRIP-MCNC: NEGATIVE MG/DL
LEUKOCYTE ESTERASE UR QL STRIP: NEGATIVE
LYMPHOCYTES # BLD AUTO: 2.58 THOUSANDS/ÂΜL (ref 0.6–4.47)
LYMPHOCYTES NFR BLD AUTO: 25 % (ref 14–44)
MCH RBC QN AUTO: 31.8 PG (ref 26.8–34.3)
MCHC RBC AUTO-ENTMCNC: 31.8 G/DL (ref 31.4–37.4)
MCV RBC AUTO: 100 FL (ref 82–98)
MICROALBUMIN UR-MCNC: <7 MG/L
MICROALBUMIN/CREAT 24H UR: <12 MG/G CREATININE (ref 0–30)
MONOCYTES # BLD AUTO: 0.61 THOUSAND/ÂΜL (ref 0.17–1.22)
MONOCYTES NFR BLD AUTO: 6 % (ref 4–12)
NEUTROPHILS # BLD AUTO: 6.66 THOUSANDS/ÂΜL (ref 1.85–7.62)
NEUTS SEG NFR BLD AUTO: 62 % (ref 43–75)
NITRITE UR QL STRIP: NEGATIVE
NRBC BLD AUTO-RTO: 0 /100 WBCS
PH UR STRIP.AUTO: 6 [PH]
PLATELET # BLD AUTO: 180 THOUSANDS/UL (ref 149–390)
PMV BLD AUTO: 10.4 FL (ref 8.9–12.7)
PROT UR STRIP-MCNC: NEGATIVE MG/DL
PSA SERPL-MCNC: 0.41 NG/ML (ref 0–4)
RBC # BLD AUTO: 4.78 MILLION/UL (ref 3.88–5.62)
SP GR UR STRIP.AUTO: 1.01 (ref 1–1.03)
TSH SERPL DL<=0.05 MIU/L-ACNC: 2.95 UIU/ML (ref 0.45–4.5)
UROBILINOGEN UR STRIP-ACNC: <2 MG/DL
WBC # BLD AUTO: 10.51 THOUSAND/UL (ref 4.31–10.16)

## 2023-11-04 PROCEDURE — G0103 PSA SCREENING: HCPCS

## 2023-11-04 PROCEDURE — 85025 COMPLETE CBC W/AUTO DIFF WBC: CPT

## 2023-11-04 PROCEDURE — 82570 ASSAY OF URINE CREATININE: CPT

## 2023-11-04 PROCEDURE — 36415 COLL VENOUS BLD VENIPUNCTURE: CPT

## 2023-11-04 PROCEDURE — 82043 UR ALBUMIN QUANTITATIVE: CPT

## 2023-11-04 PROCEDURE — 80053 COMPREHEN METABOLIC PANEL: CPT

## 2023-11-04 PROCEDURE — 82550 ASSAY OF CK (CPK): CPT

## 2023-11-04 PROCEDURE — 84443 ASSAY THYROID STIM HORMONE: CPT

## 2023-11-04 PROCEDURE — 83036 HEMOGLOBIN GLYCOSYLATED A1C: CPT

## 2023-11-04 PROCEDURE — 82248 BILIRUBIN DIRECT: CPT

## 2023-11-04 PROCEDURE — 80061 LIPID PANEL: CPT

## 2023-11-05 LAB
ALBUMIN SERPL BCP-MCNC: 4.2 G/DL (ref 3.5–5)
ALP SERPL-CCNC: 53 U/L (ref 34–104)
ALT SERPL W P-5'-P-CCNC: 34 U/L (ref 7–52)
ANION GAP SERPL CALCULATED.3IONS-SCNC: 12 MMOL/L
AST SERPL W P-5'-P-CCNC: 30 U/L (ref 13–39)
BILIRUB DIRECT SERPL-MCNC: 0.1 MG/DL (ref 0–0.2)
BILIRUB SERPL-MCNC: 0.42 MG/DL (ref 0.2–1)
BUN SERPL-MCNC: 16 MG/DL (ref 5–25)
CALCIUM SERPL-MCNC: 10.5 MG/DL (ref 8.4–10.2)
CHLORIDE SERPL-SCNC: 101 MMOL/L (ref 96–108)
CHOLEST SERPL-MCNC: 135 MG/DL
CK SERPL-CCNC: 47 U/L (ref 39–308)
CO2 SERPL-SCNC: 25 MMOL/L (ref 21–32)
CREAT SERPL-MCNC: 0.87 MG/DL (ref 0.6–1.3)
EST. AVERAGE GLUCOSE BLD GHB EST-MCNC: 189 MG/DL
GFR SERPL CREATININE-BSD FRML MDRD: 88 ML/MIN/1.73SQ M
GLUCOSE P FAST SERPL-MCNC: 126 MG/DL (ref 65–99)
HBA1C MFR BLD: 8.2 %
HDLC SERPL-MCNC: 39 MG/DL
LDLC SERPL CALC-MCNC: 67 MG/DL (ref 0–100)
NONHDLC SERPL-MCNC: 96 MG/DL
POTASSIUM SERPL-SCNC: 4.9 MMOL/L (ref 3.5–5.3)
PROT SERPL-MCNC: 7.8 G/DL (ref 6.4–8.4)
SODIUM SERPL-SCNC: 138 MMOL/L (ref 135–147)
TRIGL SERPL-MCNC: 143 MG/DL

## 2023-11-09 LAB
6MAM UR QL SCN: NEGATIVE NG/ML
ACCEPTABLE CREAT UR QL: 59 MG/DL
AMPHET UR QL SCN: NEGATIVE NG/ML
BARBITURATES UR QL SCN: NEGATIVE NG/ML
BENZODIAZ UR QL SCN: NEGATIVE NG/ML
BUPRENORPHINE UR QL CFM: NEGATIVE NG/ML
CANNABINOIDS UR QL SCN: ABNORMAL NG/ML
CANNABINOIDS/CREAT UR: 105 NG/MG CREAT
CARISOPRODOL UR QL: NEGATIVE NG/ML
COCAINE+BZE UR QL SCN: NEGATIVE NG/ML
CODEINE UR QL CFM: NOT DETECTED NG/MG CREAT
DHC UR CFM-MCNC: NOT DETECTED NG/MG CREAT
ETHYL GLUCURONIDE UR QL SCN: NEGATIVE NG/ML
FENTANYL UR QL SCN: NEGATIVE NG/ML
GABAPENTIN SERPLBLD QL SCN: ABNORMAL UG/ML
GABAPENTIN UR QL CFM: PRESENT
HYDROCODONE UR QL CFM: 454 NG/MG CREAT
HYDROMORPHONE UR QL CFM: NOT DETECTED NG/MG CREAT
METHADONE UR QL SCN: NEGATIVE NG/ML
MORPHINE UR QL CFM: NOT DETECTED NG/MG CREAT
NITRITE UR QL STRIP: NEGATIVE UG/ML
NORCODEINE/CREAT UR CFM: NOT DETECTED NG/MG CREAT
NORHYDROCODONE UR CFM-MCNC: 502 NG/MG CREAT
NORMORPHINE: NOT DETECTED NG/MG CREAT
OPIATES UR QL SCN: ABNORMAL NG/ML
OXYCODONE+OXYMORPHONE UR QL SCN: NEGATIVE NG/ML
PCP UR QL SCN: NEGATIVE NG/ML
PREGABALIN UR QL CFM: NOT DETECTED
PROPOXYPH UR QL SCN: NEGATIVE NG/ML
SPECIMEN PH ACCEPTABLE UR: 5.6 (ref 4.5–8.9)
TAPENTADOL UR QL SCN: NEGATIVE NG/ML
TRAMADOL UR QL SCN: NEGATIVE NG/ML

## 2023-11-13 DIAGNOSIS — E11.8 TYPE 2 DIABETES MELLITUS WITH COMPLICATION, WITHOUT LONG-TERM CURRENT USE OF INSULIN (HCC): ICD-10-CM

## 2023-11-13 RX ORDER — SITAGLIPTIN AND METFORMIN HYDROCHLORIDE 1000; 100 MG/1; MG/1
1 TABLET, FILM COATED, EXTENDED RELEASE ORAL
Qty: 90 TABLET | Refills: 0 | Status: SHIPPED | OUTPATIENT
Start: 2023-11-13

## 2023-11-27 DIAGNOSIS — J44.0 COPD (CHRONIC OBSTRUCTIVE PULMONARY DISEASE) WITH ACUTE BRONCHITIS: ICD-10-CM

## 2023-11-27 DIAGNOSIS — J20.9 COPD (CHRONIC OBSTRUCTIVE PULMONARY DISEASE) WITH ACUTE BRONCHITIS: ICD-10-CM

## 2023-11-27 RX ORDER — ALBUTEROL SULFATE 90 UG/1
AEROSOL, METERED RESPIRATORY (INHALATION)
Qty: 18 G | Refills: 0 | Status: SHIPPED | OUTPATIENT
Start: 2023-11-27

## 2023-12-04 ENCOUNTER — ANESTHESIA (OUTPATIENT)
Dept: ANESTHESIOLOGY | Facility: AMBULATORY SURGERY CENTER | Age: 68
End: 2023-12-04

## 2023-12-04 ENCOUNTER — ANESTHESIA EVENT (OUTPATIENT)
Dept: ANESTHESIOLOGY | Facility: AMBULATORY SURGERY CENTER | Age: 68
End: 2023-12-04

## 2023-12-04 ENCOUNTER — TELEPHONE (OUTPATIENT)
Dept: GASTROENTEROLOGY | Facility: CLINIC | Age: 68
End: 2023-12-04

## 2023-12-07 NOTE — TELEPHONE ENCOUNTER
Called [facility] and [facility] for nursing to call back in regards to holding Plavix for 5 days prior.  Pt procedure is 12/18

## 2023-12-07 NOTE — TELEPHONE ENCOUNTER
Lory Galeas from [facility] requesting medical clearance form to hold pt's plavix 5 days prior to colonoscopy 12/18/23. Katie Howard from [id number] [address] will re-fax form to Cardiology.

## 2023-12-08 DIAGNOSIS — M54.50 LOW BACK PAIN WITHOUT SCIATICA, UNSPECIFIED BACK PAIN LATERALITY, UNSPECIFIED CHRONICITY: ICD-10-CM

## 2023-12-08 DIAGNOSIS — R52 PAIN: ICD-10-CM

## 2023-12-08 RX ORDER — HYDROCODONE BITARTRATE AND ACETAMINOPHEN 5; 325 MG/1; MG/1
1 TABLET ORAL EVERY 6 HOURS PRN
Qty: 30 TABLET | Refills: 0 | Status: SHIPPED | OUTPATIENT
Start: 2023-12-08

## 2023-12-08 RX ORDER — METHOCARBAMOL 750 MG/1
750 TABLET, FILM COATED ORAL EVERY 6 HOURS PRN
Qty: 30 TABLET | Refills: 0 | Status: SHIPPED | OUTPATIENT
Start: 2023-12-08

## 2023-12-14 DIAGNOSIS — M48.061 LUMBAR FORAMINAL STENOSIS: ICD-10-CM

## 2023-12-14 DIAGNOSIS — M25.552 LEFT HIP PAIN: ICD-10-CM

## 2023-12-14 DIAGNOSIS — M54.16 LUMBAR RADICULOPATHY: ICD-10-CM

## 2023-12-14 DIAGNOSIS — M51.27 HERNIATED NUCLEUS PULPOSUS, L5-S1: ICD-10-CM

## 2023-12-14 DIAGNOSIS — M54.40 LOW BACK PAIN WITH SCIATICA, SCIATICA LATERALITY UNSPECIFIED, UNSPECIFIED BACK PAIN LATERALITY, UNSPECIFIED CHRONICITY: ICD-10-CM

## 2023-12-14 DIAGNOSIS — G89.4 CHRONIC PAIN SYNDROME: ICD-10-CM

## 2023-12-14 RX ORDER — GABAPENTIN 400 MG/1
CAPSULE ORAL
Qty: 270 CAPSULE | Refills: 0 | Status: SHIPPED | OUTPATIENT
Start: 2023-12-14

## 2023-12-14 NOTE — TELEPHONE ENCOUNTER
S/w [facility] pharmacy. Confirmed 8/14/23 rx for gabapentin. Pharmacist confirmed refill on file, 90 day supply. Per pharmacist, that will be prepared for pu today and the pt will be notified. 1/29/24 fu ov scheduled.        Please cancel the attached refill request.

## 2023-12-18 ENCOUNTER — HOSPITAL ENCOUNTER (OUTPATIENT)
Dept: GASTROENTEROLOGY | Facility: AMBULATORY SURGERY CENTER | Age: 68
Discharge: HOME | End: 2023-12-18
Attending: INTERNAL MEDICINE
Payer: MEDICARE

## 2023-12-18 ENCOUNTER — ANESTHESIA EVENT (OUTPATIENT)
Dept: GASTROENTEROLOGY | Facility: AMBULATORY SURGERY CENTER | Age: 68
End: 2023-12-18

## 2023-12-18 ENCOUNTER — ANESTHESIA (OUTPATIENT)
Dept: GASTROENTEROLOGY | Facility: AMBULATORY SURGERY CENTER | Age: 68
End: 2023-12-18

## 2023-12-18 VITALS
TEMPERATURE: 98.3 F | RESPIRATION RATE: 26 BRPM | OXYGEN SATURATION: 98 % | DIASTOLIC BLOOD PRESSURE: 59 MMHG | HEIGHT: 70 IN | HEART RATE: 56 BPM | BODY MASS INDEX: 32.93 KG/M2 | WEIGHT: 230 LBS | SYSTOLIC BLOOD PRESSURE: 132 MMHG

## 2023-12-18 DIAGNOSIS — Z86.010 HISTORY OF COLON POLYPS: ICD-10-CM

## 2023-12-18 PROBLEM — G47.30 SLEEP APNEA: Status: ACTIVE | Noted: 2023-12-18

## 2023-12-18 PROBLEM — I25.2 OLD MYOCARDIAL INFARCTION: Status: ACTIVE | Noted: 2023-12-18

## 2023-12-18 PROBLEM — Z99.89 CPAP (CONTINUOUS POSITIVE AIRWAY PRESSURE) DEPENDENCE: Status: ACTIVE | Noted: 2023-12-18

## 2023-12-18 PROCEDURE — 45385 COLONOSCOPY W/LESION REMOVAL: CPT | Performed by: INTERNAL MEDICINE

## 2023-12-18 PROCEDURE — 88305 TISSUE EXAM BY PATHOLOGIST: CPT | Performed by: PATHOLOGY

## 2023-12-18 RX ORDER — PROPOFOL 10 MG/ML
INJECTION, EMULSION INTRAVENOUS AS NEEDED
Status: DISCONTINUED | OUTPATIENT
Start: 2023-12-18 | End: 2023-12-18

## 2023-12-18 RX ORDER — SODIUM CHLORIDE, SODIUM LACTATE, POTASSIUM CHLORIDE, CALCIUM CHLORIDE 600; 310; 30; 20 MG/100ML; MG/100ML; MG/100ML; MG/100ML
50 INJECTION, SOLUTION INTRAVENOUS CONTINUOUS
Status: DISCONTINUED | OUTPATIENT
Start: 2023-12-18 | End: 2023-12-18

## 2023-12-18 RX ORDER — LIDOCAINE HYDROCHLORIDE 10 MG/ML
INJECTION, SOLUTION EPIDURAL; INFILTRATION; INTRACAUDAL; PERINEURAL AS NEEDED
Status: DISCONTINUED | OUTPATIENT
Start: 2023-12-18 | End: 2023-12-18

## 2023-12-18 RX ADMIN — PROPOFOL 150 MG: 10 INJECTION, EMULSION INTRAVENOUS at 07:33

## 2023-12-18 RX ADMIN — PROPOFOL 50 MG: 10 INJECTION, EMULSION INTRAVENOUS at 07:46

## 2023-12-18 RX ADMIN — PROPOFOL 50 MG: 10 INJECTION, EMULSION INTRAVENOUS at 07:37

## 2023-12-18 RX ADMIN — PROPOFOL 50 MG: 10 INJECTION, EMULSION INTRAVENOUS at 07:40

## 2023-12-18 RX ADMIN — PROPOFOL 50 MG: 10 INJECTION, EMULSION INTRAVENOUS at 07:43

## 2023-12-18 RX ADMIN — LIDOCAINE HYDROCHLORIDE 50 MG: 10 INJECTION, SOLUTION EPIDURAL; INFILTRATION; INTRACAUDAL; PERINEURAL at 07:33

## 2023-12-18 RX ADMIN — SODIUM CHLORIDE, SODIUM LACTATE, POTASSIUM CHLORIDE, CALCIUM CHLORIDE 50 ML/HR: 600; 310; 30; 20 INJECTION, SOLUTION INTRAVENOUS at 07:07

## 2023-12-18 NOTE — H&P
History and Physical - [facility]  Cristino Odom 68 y.o. male MRN: [id number]    HPI: Cristino Odom is a 68 y.o. male who presents for colonoscopy for colorectal cancer screening.    REVIEW OF SYSTEMS: Per the HPI, and otherwise unremarkable.    Historical Information   Past Medical History:   Diagnosis Date    Allergic 1961    COPD (chronic obstructive pulmonary disease) (HCC) 2005    Coronary artery disease     CPAP (continuous positive airway pressure) dependence 12/18/2023    Diabetes mellitus (HCC) 2008    Heart disease     Hypertension 2005    Myocardial infarction (HCC) 2005    Old myocardial infarction 12/18/2023    Sleep apnea 12/18/2023    Tear of medial meniscus of left knee, subsequent encounter     RESOLVE: [id number]     Past Surgical History:   Procedure Laterality Date    APPENDECTOMY      CORONARY ANGIOPLASTY      CORONARY ARTERY BYPASS GRAFT      KNEE ARTHROSCOPY      KNEE SURGERY Right     TONSILLECTOMY AND ADENOIDECTOMY      UMBILICAL HERNIA REPAIR       Social History   Social History     Substance and Sexual Activity   Alcohol Use Not Currently    Comment: rare     Social History     Substance and Sexual Activity   Drug Use No    Comment: Occasional     Social History     Tobacco Use   Smoking Status Former    Current packs/day: 0.00    Average packs/day: 1 pack/day for 27.7 years (27.7 ttl pk-yrs)    Types: Cigarettes    Start date: 1/16/1986    Quit date: 10/2/2013    Years since quitting: 10.2    Passive exposure: Current   Smokeless Tobacco Never   Tobacco Comments    FORMER SMOKER AS PER ALL SCRIPTS      Family History   Adopted: Yes       Meds/Allergies       Current Outpatient Medications:     albuterol (PROVENTIL HFA,VENTOLIN HFA) 90 mcg/act inhaler    atenolol (TENORMIN) 25 mg tablet    clopidogrel (PLAVIX) 75 mg tablet    ezetimibe-simvastatin (VYTORIN) 10-40 mg per tablet    gabapentin (NEURONTIN) 400 mg capsule    glimepiride (AMARYL) 1 mg tablet     "HYDROcodone-acetaminophen (NORCO) 5-325 mg per tablet    methocarbamol (ROBAXIN) 750 mg tablet    QUEtiapine (SEROquel) 100 mg tablet    SITagliptin-metFORMIN HCl ER (Janumet XR) 100-1000 MG TB24    Blood Glucose Monitoring Suppl (RUBINA CONTOUR MONITOR) MIGUEL ANGEL    Blood Glucose Monitoring Suppl (BLOOD GLUCOSE MONITOR SYSTEM) w/Device KIT    clindamycin (CLEOCIN) 150 mg capsule    nitroglycerin (NITROLINGUAL) 0.4 mg/spray spray    sildenafil (VIAGRA) 100 mg tablet    Current Facility-Administered Medications:     lactated ringers infusion, 50 mL/hr, Intravenous, Continuous, 50 mL/hr at 12/18/23 0707    Allergies   Allergen Reactions    Penicillins Other (See Comments)     hives-emily cefazolin       Objective     /55   Pulse 68   Temp 98.3 °F (36.8 °C) (Temporal)   Resp 12   Ht 5' 10\" (1.778 m)   Wt 104 kg (230 lb)   SpO2 95%   BMI 33.00 kg/m²     PHYSICAL EXAM    Gen: NAD AAOx3  Head: Normocephalic, Atraumatic  CV: S1S2 RRR no m/r/g  CHEST: Clear b/l no c/r/w  ABD: soft, +BS NT/ND  EXT: no edema    ASSESSMENT/PLAN:  This is a 68 y.o. year old male here for colonoscopy, and he is stable and optimized for his procedure.        "

## 2023-12-18 NOTE — ANESTHESIA POSTPROCEDURE EVALUATION
Post-Op Assessment Note    CV Status:  Stable  Pain Score: 0    Pain management: adequate       Mental Status:  Alert and awake   Hydration Status:  Euvolemic   PONV Controlled:  None   Airway Patency:  Patent     Post Op Vitals Reviewed: Yes      Staff: Anesthesiologist, CRNA   Comments: report given to RN; VSS              BP   110/58   Temp      Pulse  62   Resp   16   SpO2   98

## 2023-12-18 NOTE — ANESTHESIA PREPROCEDURE EVALUATION
Procedure:  COLONOSCOPY    Relevant Problems   CARDIO   (+) Coronary artery disease   (+) Essential hypertension   (+) Hyperlipidemia   (+) Old myocardial infarction      ENDO   (+) Controlled type 2 diabetes mellitus without complication (HCC)      /RENAL   (+) BPH without urinary obstruction      MUSCULOSKELETAL   (+) Impingement syndrome of right shoulder   (+) Lateral epicondylitis of both elbows   (+) Localized osteoarthrosis of left hip   (+) Low back pain      NEURO/PSYCH   (+) Chronic pain syndrome      PULMONARY   (+) COPD (chronic obstructive pulmonary disease) with acute bronchitis    (+) Chronic obstructive pulmonary disease (HCC)   (+) Obstructive sleep apnea      Other   (+) CPAP (continuous positive airway pressure) dependence   (+) Obesity (BMI 30-39.9)        Physical Exam    Airway    Mallampati score: II  TM Distance: >3 FB  Neck ROM: full     Dental   No notable dental hx     Cardiovascular      Pulmonary      Other Findings        Anesthesia Plan  ASA Score- 3     Anesthesia Type- IV sedation with anesthesia with ASA Monitors.         Additional Monitors:     Airway Plan:            Plan Factors-    Chart reviewed.    Patient summary reviewed.    Patient is not a current smoker.              Induction- intravenous.    Postoperative Plan-     Informed Consent- Anesthetic plan and risks discussed with patient.  I personally reviewed this patient with the CRNA. Discussed and agreed on the Anesthesia Plan with the CRNA..

## 2023-12-21 DIAGNOSIS — R52 PAIN: ICD-10-CM

## 2023-12-21 DIAGNOSIS — M54.50 LOW BACK PAIN WITHOUT SCIATICA, UNSPECIFIED BACK PAIN LATERALITY, UNSPECIFIED CHRONICITY: ICD-10-CM

## 2023-12-21 RX ORDER — HYDROCODONE BITARTRATE AND ACETAMINOPHEN 5; 325 MG/1; MG/1
1 TABLET ORAL EVERY 6 HOURS PRN
Qty: 30 TABLET | Refills: 0 | Status: SHIPPED | OUTPATIENT
Start: 2023-12-21

## 2023-12-21 RX ORDER — METHOCARBAMOL 750 MG/1
750 TABLET, FILM COATED ORAL EVERY 6 HOURS PRN
Qty: 30 TABLET | Refills: 0 | Status: SHIPPED | OUTPATIENT
Start: 2023-12-21

## 2023-12-22 PROCEDURE — 88305 TISSUE EXAM BY PATHOLOGIST: CPT | Performed by: PATHOLOGY

## 2023-12-31 DIAGNOSIS — E11.8 TYPE 2 DIABETES MELLITUS WITH COMPLICATION, WITHOUT LONG-TERM CURRENT USE OF INSULIN (HCC): ICD-10-CM

## 2024-01-03 RX ORDER — GLIMEPIRIDE 1 MG/1
TABLET ORAL
Qty: 180 TABLET | Refills: 1 | Status: SHIPPED | OUTPATIENT
Start: 2024-01-03

## 2024-01-08 DIAGNOSIS — E11.8 TYPE 2 DIABETES MELLITUS WITH COMPLICATION, WITHOUT LONG-TERM CURRENT USE OF INSULIN (HCC): ICD-10-CM

## 2024-01-08 RX ORDER — SITAGLIPTIN AND METFORMIN HYDROCHLORIDE 1000; 100 MG/1; MG/1
1 TABLET, FILM COATED, EXTENDED RELEASE ORAL
Qty: 90 TABLET | Refills: 0 | Status: SHIPPED | OUTPATIENT
Start: 2024-01-08

## 2024-01-22 DIAGNOSIS — R52 PAIN: ICD-10-CM

## 2024-01-22 DIAGNOSIS — M54.50 LOW BACK PAIN WITHOUT SCIATICA, UNSPECIFIED BACK PAIN LATERALITY, UNSPECIFIED CHRONICITY: ICD-10-CM

## 2024-01-22 RX ORDER — HYDROCODONE BITARTRATE AND ACETAMINOPHEN 5; 325 MG/1; MG/1
1 TABLET ORAL EVERY 6 HOURS PRN
Qty: 30 TABLET | Refills: 0 | Status: SHIPPED | OUTPATIENT
Start: 2024-01-22

## 2024-01-22 RX ORDER — METHOCARBAMOL 750 MG/1
750 TABLET, FILM COATED ORAL EVERY 6 HOURS PRN
Qty: 30 TABLET | Refills: 0 | Status: SHIPPED | OUTPATIENT
Start: 2024-01-22

## 2024-01-29 ENCOUNTER — OFFICE VISIT (OUTPATIENT)
Dept: PAIN MEDICINE | Facility: CLINIC | Age: 69
End: 2024-01-29
Payer: MEDICARE

## 2024-01-29 VITALS
TEMPERATURE: 97.6 F | DIASTOLIC BLOOD PRESSURE: 60 MMHG | HEIGHT: 70 IN | WEIGHT: 224 LBS | SYSTOLIC BLOOD PRESSURE: 102 MMHG | BODY MASS INDEX: 32.07 KG/M2

## 2024-01-29 DIAGNOSIS — M48.061 LUMBAR FORAMINAL STENOSIS: ICD-10-CM

## 2024-01-29 DIAGNOSIS — M51.27 HERNIATED NUCLEUS PULPOSUS, L5-S1: ICD-10-CM

## 2024-01-29 DIAGNOSIS — M54.40 LOW BACK PAIN WITH SCIATICA, SCIATICA LATERALITY UNSPECIFIED, UNSPECIFIED BACK PAIN LATERALITY, UNSPECIFIED CHRONICITY: ICD-10-CM

## 2024-01-29 DIAGNOSIS — M54.16 LUMBAR RADICULOPATHY: ICD-10-CM

## 2024-01-29 DIAGNOSIS — M51.16 INTERVERTEBRAL DISC DISORDER WITH RADICULOPATHY OF LUMBAR REGION: ICD-10-CM

## 2024-01-29 DIAGNOSIS — M25.552 LEFT HIP PAIN: ICD-10-CM

## 2024-01-29 DIAGNOSIS — M48.061 SPINAL STENOSIS OF LUMBAR REGION, UNSPECIFIED WHETHER NEUROGENIC CLAUDICATION PRESENT: Primary | ICD-10-CM

## 2024-01-29 DIAGNOSIS — G89.4 CHRONIC PAIN SYNDROME: ICD-10-CM

## 2024-01-29 PROCEDURE — 99214 OFFICE O/P EST MOD 30 MIN: CPT | Performed by: NURSE PRACTITIONER

## 2024-01-29 RX ORDER — GABAPENTIN 400 MG/1
CAPSULE ORAL
Qty: 360 CAPSULE | Refills: 1 | Status: SHIPPED | OUTPATIENT
Start: 2024-01-29

## 2024-01-29 NOTE — PROGRESS NOTES
Assessment:  1. Spinal stenosis of lumbar region, unspecified whether neurogenic claudication present    2. Intervertebral disc disorder with radiculopathy of lumbar region    3. Left hip pain    4. Chronic pain syndrome        Plan:  The patient is a 68 y.o. male last seen on 08/14/2023 who presents for a follow up office visit in regards to chronic pain secondary to low back pain, lumbar stenosis, lumbar herniation with radiculopathy and left hip osteoarthritis.  The patient presents today with ongoing pain that is located primarily in the posterior aspect of the left thigh.  He notes the pain mostly at night he is currently taking gabapentin 400 mg 1 tablet 3 times a day which is providing pain relief without side effects.  To help with the increased neuropathic pain at night, I will increase the gabapentin dose to 400 mg 1 tab in the morning, 1 tablet in the afternoon and 2 tablets at night.  I made him aware the increased dose at night can place him at increased risk for side effects which would include blurred vision, drowsiness, and dizziness.  If the symptoms occur, he was instructed to contact the office.  A prescription for gabapentin 400 mg was sent to the pharmacy.      The patient will follow-up in 6 months for medication prescription refill and reevaluation. The patient was advised to contact the office should their symptoms worsen in the interim. The patient was agreeable and verbalized an understanding.        History of Present Illness:    The patient is a 68 y.o. male last seen on 08/14/2023 who presents for a follow up office visit in regards to chronic pain secondary to low back pain, lumbar stenosis, lumbar herniation with radiculopathy and left hip osteoarthritis.  The patient's last office visit was August 14, 2023, in which she was continued on gabapentin 400 mg 1 tablet 3 times a day.  The patient presents today with ongoing pain that is located in the left leg.  He feels pain primarily in  the posterior aspect of the left thigh.  The pain is intermittent but worse at night.  He describes it as burning.  He is currently rating his pain a 1/10 on the numeric rating scale.     Only taking gabapentin 400 mg 1 tablet 3 times a day which is providing pain relief without side effects.  Primary care physician prescribes him Norco 5/325 mg 1 tab every 6 hours and methocarbamol 750 mg 1 tablet every 6 hours    The patient had underwent a left L5 and S1 transforaminal epidural steroid injection in 2021 (15% relief) and a left hip injection in 2020 (30-40% relief for 2 weeks)      I have personally reviewed and/or updated the patient's past medical history, past surgical history, family history, social history, current medications, allergies, and vital signs today.       Review of Systems:    Review of Systems   Respiratory:  Negative for shortness of breath.    Cardiovascular:  Negative for chest pain.   Gastrointestinal:  Negative for constipation, diarrhea, nausea and vomiting.   Musculoskeletal:  Positive for gait problem. Negative for arthralgias, joint swelling and myalgias.        LLE Pain   Skin:  Negative for rash.   Neurological:  Negative for dizziness, seizures and weakness.   All other systems reviewed and are negative.        Past Medical History:   Diagnosis Date    Allergic 1961    COPD (chronic obstructive pulmonary disease) ([facility]) 2005    Coronary artery disease     CPAP (continuous positive airway pressure) dependence 12/18/2023    Diabetes mellitus ([facility]) 2008    Heart disease     Hypertension 2005    Myocardial infarction ([facility]) 2005    Old myocardial infarction 12/18/2023    Sleep apnea 12/18/2023    Tear of medial meniscus of left knee, subsequent encounter     RESOLVE: [id number]       Past Surgical History:   Procedure Laterality Date    APPENDECTOMY      CORONARY ANGIOPLASTY      CORONARY ARTERY BYPASS GRAFT      KNEE ARTHROSCOPY      KNEE SURGERY Right     TONSILLECTOMY AND ADENOIDECTOMY       UMBILICAL HERNIA REPAIR         Family History   Adopted: Yes       Social History     Occupational History    Occupation: WORKING FULL TIME    Tobacco Use    Smoking status: Former     Current packs/day: 0.00     Average packs/day: 1 pack/day for 27.7 years (27.7 ttl pk-yrs)     Types: Cigarettes     Start date: 1/16/1986     Quit date: 10/2/2013     Years since quitting: 10.3     Passive exposure: Current    Smokeless tobacco: Never    Tobacco comments:     FORMER SMOKER AS PER ALL SCRIPTS    Vaping Use    Vaping status: Some Days    Substances: Nicotine   Substance and Sexual Activity    Alcohol use: Not Currently     Comment: rare    Drug use: No     Comment: Occasional    Sexual activity: Yes     Partners: Female     Birth control/protection: None         Current Outpatient Medications:     albuterol (PROVENTIL HFA,VENTOLIN HFA) 90 mcg/act inhaler, TAKE 2 PUFFS BY MOUTH EVERY 6 HOURS AS NEEDED FOR WHEEZE, Disp: 18 g, Rfl: 0    atenolol (TENORMIN) 25 mg tablet, Take 1 tablet by mouth daily, Disp: , Rfl: 1    Blood Glucose Monitoring Suppl ([employer] CONTOUR MONITOR) MIGUEL ANGEL, by Does not apply route 2 (two) times a day, Disp: 1 Device, Rfl: 0    Blood Glucose Monitoring Suppl (BLOOD GLUCOSE MONITOR SYSTEM) w/Device KIT, by Does not apply route 2 (two) times a day, Disp: 100 each, Rfl: 3    clindamycin (CLEOCIN) 150 mg capsule, Take 150 mg by mouth every 6 (six) hours, Disp: , Rfl:     clopidogrel (PLAVIX) 75 mg tablet, Take 1 tablet (75 mg total) by mouth daily, Disp: 90 tablet, Rfl: 1    ezetimibe-simvastatin (VYTORIN) 10-40 mg per tablet, Take 1 tablet by mouth daily, Disp: , Rfl:     gabapentin (NEURONTIN) 400 mg capsule, Take 1 PO TID., Disp: 270 capsule, Rfl: 0    glimepiride (AMARYL) 1 mg tablet, TAKE 1 TABLET BY MOUTH TWICE A DAY, Disp: 180 tablet, Rfl: 1    HYDROcodone-acetaminophen (NORCO) 5-325 mg per tablet, Take 1 tablet by mouth every 6 (six) hours as needed for pain Max Daily Amount: 4 tablets, Disp: 30  tablet, Rfl: 0    methocarbamol (ROBAXIN) 750 mg tablet, Take 1 tablet (750 mg total) by mouth every 6 (six) hours as needed for muscle spasms, Disp: 30 tablet, Rfl: 0    nitroglycerin (NITROLINGUAL) 0.4 mg/spray spray, PLACE 1 SPRAY UNDER THE TONGUE EVERY 5 MINUTES FOR A MAXIMUM OF 3 DOSES AS DIRECTED FOR CHEST PAIN, Disp: , Rfl:     QUEtiapine (SEROquel) 100 mg tablet, Take 1 tablet by mouth daily, Disp: , Rfl:     sildenafil (VIAGRA) 100 mg tablet, as needed , Disp: , Rfl: 1    SITagliptin-metFORMIN HCl ER (Janumet XR) 100-1000 MG TB24, Take 1 tablet by mouth daily with dinner, Disp: 90 tablet, Rfl: 0    Allergies   Allergen Reactions    Penicillins Other (See Comments)     hives-emily cefazolin       Physical Exam:    There were no vitals taken for this visit.    Constitutional:normal, well developed, well nourished, alert, in no distress and non-toxic and no overt pain behavior.  Eyes:anicteric  HEENT:grossly intact  Neck:supple, symmetric, trachea midline and no masses   Pulmonary:even and unlabored  Cardiovascular:No edema or pitting edema present  Skin:Normal without rashes or lesions and well hydrated  Psychiatric:Mood and affect appropriate  Neurologic:Cranial Nerves II-XII grossly intact  Musculoskeletal:normal      Imaging    CT LUMBAR SPINE     INDICATION:   M25.552: Pain in left hip  M54.40: Lumbago with sciatica, unspecified side  M54.16: Radiculopathy, lumbar region.     COMPARISON: 10/22/2020.     TECHNIQUE:  Contiguous axial images through the lumbar spine were obtained. Sagittal and coronal reconstructions were performed.       Radiation dose length product (DLP) for this visit:  1497 mGy-cm .  This examination, like all CT scans performed in the [facility], was performed utilizing techniques to minimize radiation dose exposure, including the use of iterative   reconstruction and automated exposure control.       IMAGE QUALITY:  Diagnostic.     FINDINGS:     ALIGNMENT:  There are 5  lumbar type vertebral bodies.  No significant spondylolisthesis.     VERTEBRAL BODIES:  Stable anterior wedging of the T11-L1 vertebral bodies with Schmorl's nodes along the superior endplates.  No acute vertebral body fractures identified.     DEGENERATIVE CHANGES:     Lower Thoracic spine:  Normal lower thoracic disc spaces.]     L1-2:  Vacuum disc phenomenon.  Disc space degeneration and narrowing.  Dorsal osteophytosis.  Facet arthrosis.  No significant canal stenosis or foraminal narrowing.     L2-3:  No significant canal stenosis or foraminal narrowing.     L3-4:  No significant canal stenosis or foraminal narrowing.  Facet arthrosis.     L4-5:  Minimal bulge.  Facet arthrosis.  No significant canal stenosis or foraminal narrowing.     L5-S1:  Disc space narrowing.  Vacuum disc phenomenon.  Ventral and dorsal osteophytosis.  Marginal osteophytosis is present.  Facet arthrosis.  Severe left and mild right foraminal narrowing.     PARASPINAL SOFT TISSUES:  Right adrenal gland adenoma again noted.     IMPRESSION:     Multilevel degenerative changes of the lumbar spine, as described above.  Severe left foraminal narrowing is noted at L5-S1.     Stable chronic anterior wedging of the T11-L1 vertebral bodies.  No orders to display         No orders of the defined types were placed in this encounter.

## 2024-02-16 DIAGNOSIS — M54.50 LOW BACK PAIN WITHOUT SCIATICA, UNSPECIFIED BACK PAIN LATERALITY, UNSPECIFIED CHRONICITY: ICD-10-CM

## 2024-02-16 DIAGNOSIS — R52 PAIN: ICD-10-CM

## 2024-02-16 RX ORDER — HYDROCODONE BITARTRATE AND ACETAMINOPHEN 5; 325 MG/1; MG/1
1 TABLET ORAL EVERY 6 HOURS PRN
Qty: 30 TABLET | Refills: 0 | Status: SHIPPED | OUTPATIENT
Start: 2024-02-16

## 2024-02-16 RX ORDER — METHOCARBAMOL 750 MG/1
750 TABLET, FILM COATED ORAL EVERY 6 HOURS PRN
Qty: 30 TABLET | Refills: 1 | Status: SHIPPED | OUTPATIENT
Start: 2024-02-16

## 2024-02-23 ENCOUNTER — HOSPITAL ENCOUNTER (OUTPATIENT)
Dept: ULTRASOUND IMAGING | Facility: HOSPITAL | Age: 69
End: 2024-02-23
Payer: MEDICARE

## 2024-02-23 ENCOUNTER — RA CDI HCC (OUTPATIENT)
Dept: OTHER | Facility: HOSPITAL | Age: 69
End: 2024-02-23

## 2024-02-23 DIAGNOSIS — Z13.6 ENCOUNTER FOR ABDOMINAL AORTIC ANEURYSM (AAA) SCREENING: ICD-10-CM

## 2024-02-23 PROCEDURE — 76706 US ABDL AORTA SCREEN AAA: CPT

## 2024-02-23 NOTE — PROGRESS NOTES
HCC coding opportunities          Chart Reviewed number of suggestions sent to Provider: 2  E11.65  E11.22     Patients Insurance     Medicare Insurance: Medicare

## 2024-03-01 ENCOUNTER — OFFICE VISIT (OUTPATIENT)
Dept: FAMILY MEDICINE CLINIC | Facility: CLINIC | Age: 69
End: 2024-03-01
Payer: MEDICARE

## 2024-03-01 VITALS
OXYGEN SATURATION: 98 % | HEART RATE: 55 BPM | SYSTOLIC BLOOD PRESSURE: 122 MMHG | BODY MASS INDEX: 31.64 KG/M2 | WEIGHT: 221 LBS | HEIGHT: 70 IN | TEMPERATURE: 98.3 F | RESPIRATION RATE: 18 BRPM | DIASTOLIC BLOOD PRESSURE: 74 MMHG

## 2024-03-01 DIAGNOSIS — G89.4 CHRONIC PAIN SYNDROME: ICD-10-CM

## 2024-03-01 DIAGNOSIS — J20.9 COPD (CHRONIC OBSTRUCTIVE PULMONARY DISEASE) WITH ACUTE BRONCHITIS: ICD-10-CM

## 2024-03-01 DIAGNOSIS — M54.16 LUMBAR RADICULOPATHY: ICD-10-CM

## 2024-03-01 DIAGNOSIS — E11.9 CONTROLLED TYPE 2 DIABETES MELLITUS WITHOUT COMPLICATION, WITHOUT LONG-TERM CURRENT USE OF INSULIN (HCC): Primary | ICD-10-CM

## 2024-03-01 DIAGNOSIS — J44.0 COPD (CHRONIC OBSTRUCTIVE PULMONARY DISEASE) WITH ACUTE BRONCHITIS: ICD-10-CM

## 2024-03-01 LAB — SL AMB POCT HEMOGLOBIN AIC: 7 (ref ?–6.5)

## 2024-03-01 PROCEDURE — 83036 HEMOGLOBIN GLYCOSYLATED A1C: CPT | Performed by: FAMILY MEDICINE

## 2024-03-01 PROCEDURE — 99214 OFFICE O/P EST MOD 30 MIN: CPT | Performed by: FAMILY MEDICINE

## 2024-03-01 NOTE — PROGRESS NOTES
"    Assessment/Plan:       Diagnoses and all orders for this visit:    Controlled type 2 diabetes mellitus without complication, without long-term current use of insulin ([facility])  -     POCT hemoglobin A1c    COPD (chronic obstructive pulmonary disease) with acute bronchitis     Chronic pain syndrome    Lumbar radiculopathy        Patient will continue follow-up with pain management  His meds are stable  His A1c is significantly improved today and is now 7.0  He will continue his current diabetic meds  His labs and health maintenance are up-to-date  His breathing is stable  He can follow-up in 6 months or sooner if needed    Subjective:     Chief Complaint   Patient presents with    Follow-up    Medication Management        Patient ID: Cristino Odom is a 68 y.o. male.    Patient presents today for check of chronic conditions  He is doing well with no acute complaints   has been following with a specialists            The following portions of the patient's history were reviewed and updated as appropriate: allergies, current medications, past family history, past medical history, past social history, past surgical history and problem list.    Review of Systems   Constitutional: Negative.    HENT: Negative.     Eyes: Negative.    Respiratory: Negative.     Cardiovascular: Negative.    Gastrointestinal: Negative.    Endocrine: Negative.    Genitourinary: Negative.    Musculoskeletal: Negative.    Skin: Negative.    Allergic/Immunologic: Negative.    Neurological: Negative.    Hematological: Negative.    Psychiatric/Behavioral: Negative.     All other systems reviewed and are negative.        Objective:    Vitals:    03/01/24 1110   BP: 122/74   BP Location: Left arm   Patient Position: Sitting   Cuff Size: Large   Pulse: 55   Resp: 18   Temp: 98.3 °F (36.8 °C)   TempSrc: Tympanic   SpO2: 98%   Weight: 100 kg (221 lb)   Height: 5' 10\" (1.778 m)          Physical Exam  Vitals and nursing note reviewed.   Constitutional:      "  Appearance: Normal appearance.   HENT:      Head: Normocephalic.      Right Ear: Tympanic membrane, ear canal and external ear normal.      Left Ear: Tympanic membrane, ear canal and external ear normal.      Nose: Nose normal.      Mouth/Throat:      Mouth: Mucous membranes are moist.   Eyes:      Conjunctiva/sclera: Conjunctivae normal.      Pupils: Pupils are equal, round, and reactive to light.   Cardiovascular:      Rate and Rhythm: Normal rate and regular rhythm.      Pulses: no weak pulses.           Dorsalis pedis pulses are 2+ on the right side and 2+ on the left side.        Posterior tibial pulses are 2+ on the right side and 2+ on the left side.   Pulmonary:      Effort: Pulmonary effort is normal.      Breath sounds: Normal breath sounds.   Abdominal:      General: Abdomen is flat. Bowel sounds are normal.      Palpations: Abdomen is soft.   Musculoskeletal:         General: Normal range of motion.   Feet:      Right foot:      Skin integrity: No ulcer, skin breakdown, erythema, warmth, callus or dry skin.      Left foot:      Skin integrity: No ulcer, skin breakdown, erythema, warmth, callus or dry skin.   Skin:     General: Skin is warm and dry.   Neurological:      General: No focal deficit present.      Mental Status: He is alert and oriented to person, place, and time. Mental status is at baseline.   Psychiatric:         Mood and Affect: Mood normal.         Behavior: Behavior normal.         Thought Content: Thought content normal.         Judgment: Judgment normal.       Diabetic Foot Exam    Patient's shoes and socks removed.    Right Foot/Ankle   Right Foot Inspection  Skin Exam: skin normal. Skin not intact, no dry skin, no warmth, no callus, no erythema, no maceration, no abnormal color, no pre-ulcer, no ulcer and no callus.     Toe Exam: ROM and strength within normal limits.     Sensory   Vibration: intact  Proprioception: intact  Monofilament testing: intact    Vascular  Capillary  refills: < 3 seconds  The right DP pulse is 2+. The right PT pulse is 2+.     Left Foot/Ankle  Left Foot Inspection  Skin Exam: skin normal. Skin not intact, no dry skin, no warmth, no erythema, no maceration, normal color, no pre-ulcer, no ulcer and no callus.     Toe Exam: ROM and strength within normal limits.     Sensory   Vibration: intact  Proprioception: intact  Monofilament testing: intact    Vascular  Capillary refills: < 3 seconds  The left DP pulse is 2+. The left PT pulse is 2+.     Assign Risk Category  No deformity present  No loss of protective sensation  No weak pulses  Risk: 0

## 2024-03-07 NOTE — PROGRESS NOTES
Daily Note     Today's date: 2022  Patient name: Clara Evans  : 1955  MRN: [id number]  Referring provider: Laxmi Streeter PA-C  Dx:   Encounter Diagnosis     ICD-10-CM    1  Chronic right shoulder pain  M25 511     G89 29                   Subjective: Shoulder feeling better, happy with progress in PT and felt really good after last session       Objective: See treatment diary below  FOTO score assessed today, improved to 59      Assessment: Tolerated treatment well  Pt reports improved sxs from baseline following TE today  FOTO score improved today, however pt's full, pain-free right shoulder mobility & stability remain limited at this time, contributing to functional deficits  Patient demonstrated fatigue post treatment, exhibited good technique with therapeutic exercises and would benefit from continued PT      Plan: Progress treatment as tolerated           Precautions: standard     End POC: 2022    Manuals            R shoulder PROM             IASTM post shoulder                        Re-Evaluation            Neuro Re-Ed            scap retraction  5" x20           Rows PTB 3X10           Extension PTB 3x10           ER BTB 3x10           IR BTB 3x10                                   Ther Ex            UBE 2'/2' L4           Strap IR Stretch 10x10"           Gentle Biceps Doorway Stretch R 15"x5                                                                       Ther Activity                                    Gait Training                                    Modalities Goal Outcome Evaluation:  Plan of Care Reviewed With: patient           Outcome Evaluation: VSS. RA. Alert and oriented x4. no complaints of pain or nausea. Lap sites clean and dry with no drainage. Pt voided and ambulating independently. Fluids infusing. pt resting comfortably. no further complaints at this time.

## 2024-03-18 DIAGNOSIS — R52 PAIN: ICD-10-CM

## 2024-03-18 DIAGNOSIS — M54.50 LOW BACK PAIN WITHOUT SCIATICA, UNSPECIFIED BACK PAIN LATERALITY, UNSPECIFIED CHRONICITY: ICD-10-CM

## 2024-03-18 RX ORDER — HYDROCODONE BITARTRATE AND ACETAMINOPHEN 5; 325 MG/1; MG/1
1 TABLET ORAL EVERY 6 HOURS PRN
Qty: 30 TABLET | Refills: 0 | Status: SHIPPED | OUTPATIENT
Start: 2024-03-18

## 2024-03-18 RX ORDER — METHOCARBAMOL 750 MG/1
750 TABLET, FILM COATED ORAL EVERY 6 HOURS PRN
Qty: 30 TABLET | Refills: 0 | Status: SHIPPED | OUTPATIENT
Start: 2024-03-18

## 2024-04-01 ENCOUNTER — APPOINTMENT (EMERGENCY)
Dept: CT IMAGING | Facility: HOSPITAL | Age: 69
End: 2024-04-01
Attending: EMERGENCY MEDICINE
Payer: MEDICARE

## 2024-04-01 ENCOUNTER — HOSPITAL ENCOUNTER (EMERGENCY)
Facility: HOSPITAL | Age: 69
Discharge: HOME | End: 2024-04-01
Attending: EMERGENCY MEDICINE
Payer: MEDICARE

## 2024-04-01 ENCOUNTER — APPOINTMENT (EMERGENCY)
Dept: RADIOLOGY | Facility: HOSPITAL | Age: 69
End: 2024-04-01
Payer: MEDICARE

## 2024-04-01 VITALS
OXYGEN SATURATION: 97 % | DIASTOLIC BLOOD PRESSURE: 67 MMHG | TEMPERATURE: 98.3 F | HEIGHT: 70 IN | SYSTOLIC BLOOD PRESSURE: 157 MMHG | HEART RATE: 61 BPM | RESPIRATION RATE: 18 BRPM | WEIGHT: 220 LBS | BODY MASS INDEX: 31.5 KG/M2

## 2024-04-01 DIAGNOSIS — M79.605 LEFT LEG PAIN: Primary | ICD-10-CM

## 2024-04-01 PROCEDURE — 72131 CT LUMBAR SPINE W/O DYE: CPT

## 2024-04-01 PROCEDURE — 96375 TX/PRO/DX INJ NEW DRUG ADDON: CPT

## 2024-04-01 PROCEDURE — 99285 EMERGENCY DEPT VISIT HI MDM: CPT | Performed by: EMERGENCY MEDICINE

## 2024-04-01 PROCEDURE — 73502 X-RAY EXAM HIP UNI 2-3 VIEWS: CPT

## 2024-04-01 PROCEDURE — 93005 ELECTROCARDIOGRAM TRACING: CPT

## 2024-04-01 PROCEDURE — 96376 TX/PRO/DX INJ SAME DRUG ADON: CPT

## 2024-04-01 PROCEDURE — 73700 CT LOWER EXTREMITY W/O DYE: CPT

## 2024-04-01 PROCEDURE — 96374 THER/PROPH/DIAG INJ IV PUSH: CPT

## 2024-04-01 PROCEDURE — 99284 EMERGENCY DEPT VISIT MOD MDM: CPT

## 2024-04-01 RX ORDER — PREDNISONE 20 MG/1
60 TABLET ORAL ONCE
Status: COMPLETED | OUTPATIENT
Start: 2024-04-01 | End: 2024-04-01

## 2024-04-01 RX ORDER — HYDROMORPHONE HCL/PF 1 MG/ML
0.5 SYRINGE (ML) INJECTION ONCE
Status: COMPLETED | OUTPATIENT
Start: 2024-04-01 | End: 2024-04-01

## 2024-04-01 RX ORDER — KETOROLAC TROMETHAMINE 30 MG/ML
15 INJECTION, SOLUTION INTRAMUSCULAR; INTRAVENOUS ONCE
Status: COMPLETED | OUTPATIENT
Start: 2024-04-01 | End: 2024-04-01

## 2024-04-01 RX ORDER — HYDROCODONE BITARTRATE AND ACETAMINOPHEN 5; 325 MG/1; MG/1
1 TABLET ORAL ONCE
Status: COMPLETED | OUTPATIENT
Start: 2024-04-01 | End: 2024-04-01

## 2024-04-01 RX ORDER — LIDOCAINE 50 MG/G
1 PATCH TOPICAL ONCE
Status: DISCONTINUED | OUTPATIENT
Start: 2024-04-01 | End: 2024-04-01 | Stop reason: HOSPADM

## 2024-04-01 RX ADMIN — LIDOCAINE 1 PATCH: 50 PATCH TOPICAL at 05:54

## 2024-04-01 RX ADMIN — KETOROLAC TROMETHAMINE 15 MG: 30 INJECTION, SOLUTION INTRAMUSCULAR; INTRAVENOUS at 05:54

## 2024-04-01 RX ADMIN — PREDNISONE 60 MG: 20 TABLET ORAL at 05:59

## 2024-04-01 RX ADMIN — HYDROCODONE BITARTRATE AND ACETAMINOPHEN 1 TABLET: 5; 325 TABLET ORAL at 07:30

## 2024-04-01 RX ADMIN — HYDROMORPHONE HYDROCHLORIDE 0.5 MG: 1 INJECTION, SOLUTION INTRAMUSCULAR; INTRAVENOUS; SUBCUTANEOUS at 06:31

## 2024-04-01 RX ADMIN — HYDROMORPHONE HYDROCHLORIDE 0.5 MG: 1 INJECTION, SOLUTION INTRAMUSCULAR; INTRAVENOUS; SUBCUTANEOUS at 05:54

## 2024-04-01 NOTE — ED PROVIDER NOTES
History  Chief Complaint   Patient presents with    Leg Injury     Pt was brought ion by EMS. Pt states that he was at a game and tripped on a step twisted his left leg on Saturday. Pt denies taking any OTC meds for the pain. Pt states that the pain has increased today. Pt thought that it would just go away. Pt denies LOC. Pt denies hitting his head or even falling. Pt states that he caught himself. Pt is on a blood thinner      Hx from patient, paramedics, medical records - pmh chronic back pain/ sciatica, takes Plavix but did not take any yesterday, diabetes, LEEP apnea, COPD, complains of left buttock pain radiating down into the left hamstring started after twisting his leg when he was in [place] watching a hockey game 3 days ago.  He had tripped on a step at that time.  Pain is constant worse with movement he has not been able to ambulate due to the pain.  No loss of bowel or bladder control no numbness or weakness.  He did not hit his head or pass out when he fell.  No headache or confusion.    GCS 15.  Breath sounds equal. No crepitus or chest wall tenderness with compression over the chest. No pain or instability with compression over the pelvis. No bedside imaging required. Patient is stable to proceed to CT scan.          Prior to Admission Medications   Prescriptions Last Dose Informant Patient Reported? Taking?   Blood Glucose Monitoring Suppl ([email or web address] CONTOUR MONITOR) MIGUEL ANGEL  Self No No   Sig: by Does not apply route 2 (two) times a day   Patient not taking: Reported on 3/1/2024   Blood Glucose Monitoring Suppl (BLOOD GLUCOSE MONITOR SYSTEM) w/Device KIT  Self No No   Sig: by Does not apply route 2 (two) times a day   HYDROcodone-acetaminophen (NORCO) 5-325 mg per tablet   No No   Sig: Take 1 tablet by mouth every 6 (six) hours as needed for pain Max Daily Amount: 4 tablets   QUEtiapine (SEROquel) 100 mg tablet  Self Yes No   Sig: Take 1 tablet by mouth daily   SITagliptin-metFORMIN HCl ER (Janumet XR)  100-1000 MG TB24  Self No No   Sig: Take 1 tablet by mouth daily with dinner   albuterol (PROVENTIL HFA,VENTOLIN HFA) 90 mcg/act inhaler  Self No No   Sig: TAKE 2 PUFFS BY MOUTH EVERY 6 HOURS AS NEEDED FOR WHEEZE   atenolol (TENORMIN) 25 mg tablet  Self Yes No   Sig: Take 1 tablet by mouth daily Half tablet daily 12.5mg   clindamycin (CLEOCIN) 150 mg capsule  Self Yes No   Sig: Take 150 mg by mouth every 6 (six) hours   Patient not taking: Reported on 3/1/2024   clopidogrel (PLAVIX) 75 mg tablet  Self No No   Sig: Take 1 tablet (75 mg total) by mouth daily   ezetimibe-simvastatin (VYTORIN) 10-40 mg per tablet  Self Yes No   Sig: Take 1 tablet by mouth daily   gabapentin (NEURONTIN) 400 mg capsule  Self No No   Sig: Take 1 tab in the a.m., 1 in the afternoon, and 2 tabs at night   glimepiride (AMARYL) 1 mg tablet  Self No No   Sig: TAKE 1 TABLET BY MOUTH TWICE A DAY   methocarbamol (ROBAXIN) 750 mg tablet   No No   Sig: Take 1 tablet (750 mg total) by mouth every 6 (six) hours as needed for muscle spasms   nitroglycerin (NITROLINGUAL) 0.4 mg/spray spray  Self Yes No   Sig: As needed   sildenafil (VIAGRA) 100 mg tablet  Self Yes No   Sig: as needed       Facility-Administered Medications: None       Past Medical History:   Diagnosis Date    Allergic 1961    COPD (chronic obstructive pulmonary disease) ([facility]) 2005    Coronary artery disease     CPAP (continuous positive airway pressure) dependence 12/18/2023    Diabetes mellitus ([facility]) 2008    Heart disease     Hypertension 2005    Myocardial infarction ([facility]) 2005    Old myocardial infarction 12/18/2023    Sleep apnea 12/18/2023    Tear of medial meniscus of left knee, subsequent encounter     RESOLVE: [id number]       Past Surgical History:   Procedure Laterality Date    APPENDECTOMY      CORONARY ANGIOPLASTY      CORONARY ARTERY BYPASS GRAFT      KNEE ARTHROSCOPY      KNEE SURGERY Right     TONSILLECTOMY AND ADENOIDECTOMY      UMBILICAL HERNIA REPAIR         Family  History   Adopted: Yes     I have reviewed and agree with the history as documented.    E-Cigarette/Vaping    E-Cigarette Use Former User      E-Cigarette/Vaping Substances    Nicotine No     THC No     CBD No     Flavoring No     Other No     Unknown No      Social History     Tobacco Use    Smoking status: Former     Current packs/day: 0.00     Average packs/day: 1 pack/day for 27.7 years (27.7 ttl pk-yrs)     Types: Cigarettes     Start date: 1/16/1986     Quit date: 10/2/2013     Years since quitting: 10.5     Passive exposure: Current    Smokeless tobacco: Never    Tobacco comments:     FORMER SMOKER AS PER ALL SCRIPTS    Vaping Use    Vaping status: Former   Substance Use Topics    Alcohol use: Not Currently     Comment: rare    Drug use: Not Currently       Review of Systems   Constitutional:  Negative for chills and fever.   HENT:  Negative for ear pain and sore throat.    Eyes:  Negative for pain and visual disturbance.   Respiratory:  Negative for cough and shortness of breath.    Cardiovascular:  Negative for chest pain and palpitations.   Gastrointestinal:  Negative for abdominal pain and vomiting.   Genitourinary:  Negative for dysuria and hematuria.   Musculoskeletal:  Positive for arthralgias, back pain and myalgias.   Skin:  Negative for color change and rash.   Neurological:  Negative for seizures and syncope.   All other systems reviewed and are negative.    Initial assessment - Airway patent, lungs clear bilateral, pulses intact distally bilateral, clothing removed for eval, GCS 15.  NV intact.    Physical Exam  Physical Exam  Vitals and nursing note reviewed.   Constitutional:       General: He is in acute distress.      Appearance: He is well-developed.   HENT:      Head: Normocephalic and atraumatic.      Mouth/Throat:      Mouth: Mucous membranes are moist.      Pharynx: Oropharynx is clear.   Eyes:      Conjunctiva/sclera: Conjunctivae normal.   Cardiovascular:      Rate and Rhythm: Normal  rate and regular rhythm.      Heart sounds: No murmur heard.  Pulmonary:      Effort: Pulmonary effort is normal. No respiratory distress.      Breath sounds: Normal breath sounds.   Chest:      Chest wall: No tenderness.   Abdominal:      Palpations: Abdomen is soft.      Tenderness: There is no abdominal tenderness.   Musculoskeletal:         General: No swelling.      Cervical back: Neck supple. No bony tenderness.      Thoracic back: No bony tenderness.      Lumbar back: No bony tenderness. Negative right straight leg raise test and negative left straight leg raise test.        Back:       Left hip: Tenderness present. No deformity. Normal range of motion.      Left foot: Normal pulse.      Comments: No swelling/ contusion/ deformity at left buttock/ thigh or hamstring   Skin:     General: Skin is warm and dry.      Capillary Refill: Capillary refill takes less than 2 seconds.   Neurological:      Mental Status: He is alert.   Psychiatric:         Mood and Affect: Mood is anxious.         Vital Signs  ED Triage Vitals   Temperature Pulse Respirations Blood Pressure SpO2   04/01/24 0525 04/01/24 0525 04/01/24 0525 04/01/24 0525 04/01/24 0525   98.3 °F (36.8 °C) 64 20 133/60 95 %      Temp Source Heart Rate Source Patient Position - Orthostatic VS BP Location FiO2 (%)   04/01/24 0525 04/01/24 0600 04/01/24 0600 04/01/24 0600 --   Temporal Monitor Lying Right arm       Pain Score       04/01/24 0554       10 - Worst Possible Pain           Vitals:    04/01/24 0525 04/01/24 0600 04/01/24 0630 04/01/24 0727   BP: 133/60 145/61 146/67 157/67   Pulse: 64 63 56 61   Patient Position - Orthostatic VS:  Lying  Lying       Visual Acuity      ED Medications  Medications   lidocaine (LIDODERM) 5 % patch 1 patch (1 patch Topical Medication Applied 4/1/24 0554)   HYDROmorphone (DILAUDID) injection 0.5 mg (0.5 mg Intravenous Given 4/1/24 0554)   ketorolac (TORADOL) injection 15 mg (15 mg Intravenous Given 4/1/24 0554)    predniSONE tablet 60 mg (60 mg Oral Given 4/1/24 0559)   HYDROmorphone (DILAUDID) injection 0.5 mg (0.5 mg Intravenous Given 4/1/24 0631)   HYDROcodone-acetaminophen (NORCO) 5-325 mg per tablet 1 tablet (1 tablet Oral Given 4/1/24 0730)       Diagnostic Studies  Results Reviewed       None                   CT lower extremity wo contrast left   Final Result by Librado Elaine MD (04/01 0700)      No acute osseous abnormality.      Moderate to severe left hip osteoarthritis.               Workstation performed: [id number]         CT spine lumbar without contrast   Final Result by Librado Elaine MD (04/01 0709)      No acute osseous abnormality or malalignment.               Workstation performed: [id number]         XR hip/pelv 2-3 vws left if performed   ED Interpretation by Cristino Rocha DO (04/01 0604)   Arthritis left hip, no obvious fx, no dislocation, interpreted by me                 Procedures  ECG 12 Lead Documentation Only    Date/Time: 4/1/2024 6:12 AM    Performed by: Cristino Rocha DO  Authorized by: Cristino Rocha DO    Indications / Diagnosis:  Trauma  ECG reviewed by me, the ED Provider: yes    Patient location:  ED  Previous ECG:     Previous ECG:  Unavailable  Interpretation:     Interpretation: normal    Rate:     ECG rate:  68    ECG rate assessment: normal    Rhythm:     Rhythm: sinus rhythm    Ectopy:     Ectopy: none    QRS:     QRS axis:  Normal    QRS intervals:  Normal  Conduction:     Conduction: normal    ST segments:     ST segments:  Normal  T waves:     T waves: normal    Comments:      This EKG was interpreted by me.           ED Course  ED Course as of 04/01/24 0735   Mon Apr 01, 2024   0617 Reviewed medical records - coincidentally pain is in same region as patient's chronic pain.  He saw pain management January 2024 with Neurontin, previous epidural steroid injections.  He recently had hydrocodone acetaminophen and robaxin renewal middle of March 24 as well.    0618 Patient still in pain, writhing in bed, appears anxious - will give rest of hydromorphone and get further imaging.   0652 S/o to [facility] - twisted left leg couple days ago, severe pain left buttock into hamstring - xray hip arthritis, ct lumbar spine and left hip pending, home if studies negative, already has robaxin and hydrocodone acetaminophen at home         no emergent consults required, no reason for trauma transfer.                      SBIRT 20yo+      Flowsheet Row Most Recent Value   Initial Alcohol Screen: US AUDIT-C     1. How often do you have a drink containing alcohol? 1 Filed at: 04/01/2024 0531   2. How many drinks containing alcohol do you have on a typical day you are drinking?  1 Filed at: 04/01/2024 0531   3a. Male UNDER 65: How often do you have five or more drinks on one occasion? 0 Filed at: 04/01/2024 0531   3b. FEMALE Any Age, or MALE 65+: How often do you have 4 or more drinks on one occassion? 0 Filed at: 04/01/2024 0531   Audit-C Score 2 Filed at: 04/01/2024 0531   CLAY: How many times in the past year have you...    Used an illegal drug or used a prescription medication for non-medical reasons? Never Filed at: 04/01/2024 0531                      Medical Decision Making  Diff includes acute left low back strain, sciatica, acute exacerbation of chronic back pain, lumbar radiculopathy, less likely compression fx, contusion, hematoma, hip fracture.  Other diff left hip arthritis.    Amount and/or Complexity of Data Reviewed  Radiology: ordered and independent interpretation performed.    Risk  Prescription drug management.             Disposition  Final diagnoses:   Left leg pain     Time reflects when diagnosis was documented in both MDM as applicable and the Disposition within this note       Time User Action Codes Description Comment    4/1/2024  7:24 AM Kentrell Fernández Add [M79.605] Left leg pain           ED Disposition       ED Disposition   Discharge    Condition   Stable     Date/Time   Mon Apr 1, 2024 0724    Comment   Cristino Odom discharge to home/self care.                   Follow-up Information       Follow up With Specialties Details Why Contact Info    Leo Bliss DO Family Medicine   [facility]  [address] [place] [id number] [phone]              Patient's Medications   Discharge Prescriptions    No medications on file       No discharge procedures on file.    PDMP Review         Value Time User    PDMP Reviewed  Yes 3/1/2024 11:17 AM Leo Bliss DO            ED Provider  Electronically Signed by             Cristino Rocha DO  04/01/24 0735

## 2024-04-02 ENCOUNTER — VBI (OUTPATIENT)
Dept: FAMILY MEDICINE CLINIC | Facility: CLINIC | Age: 69
End: 2024-04-02

## 2024-04-02 NOTE — TELEPHONE ENCOUNTER
04/02/24 9:50 AM    Patient contacted post ED visit, [facility] department spoke with patient/caregiver and outreach was successful.    Thank you.  Herminio Kat  [facility]

## 2024-04-05 DIAGNOSIS — R52 PAIN: ICD-10-CM

## 2024-04-05 DIAGNOSIS — E11.8 TYPE 2 DIABETES MELLITUS WITH COMPLICATION, WITHOUT LONG-TERM CURRENT USE OF INSULIN (HCC): ICD-10-CM

## 2024-04-05 DIAGNOSIS — M54.50 LOW BACK PAIN WITHOUT SCIATICA, UNSPECIFIED BACK PAIN LATERALITY, UNSPECIFIED CHRONICITY: ICD-10-CM

## 2024-04-05 LAB
ATRIAL RATE: 68 BPM
P AXIS: 64 DEGREES
PR INTERVAL: 186 MS
QRS AXIS: 76 DEGREES
QRSD INTERVAL: 86 MS
QT INTERVAL: 386 MS
QTC INTERVAL: 410 MS
T WAVE AXIS: 32 DEGREES
VENTRICULAR RATE: 68 BPM

## 2024-04-05 PROCEDURE — 93010 ELECTROCARDIOGRAM REPORT: CPT | Performed by: INTERNAL MEDICINE

## 2024-04-05 RX ORDER — METHOCARBAMOL 750 MG/1
750 TABLET, FILM COATED ORAL EVERY 6 HOURS PRN
Qty: 30 TABLET | Refills: 0 | Status: SHIPPED | OUTPATIENT
Start: 2024-04-05

## 2024-04-05 RX ORDER — HYDROCODONE BITARTRATE AND ACETAMINOPHEN 5; 325 MG/1; MG/1
1 TABLET ORAL EVERY 6 HOURS PRN
Qty: 30 TABLET | Refills: 0 | Status: SHIPPED | OUTPATIENT
Start: 2024-04-05

## 2024-04-05 RX ORDER — GLIMEPIRIDE 1 MG/1
1 TABLET ORAL 2 TIMES DAILY
Qty: 180 TABLET | Refills: 1 | Status: SHIPPED | OUTPATIENT
Start: 2024-04-05

## 2024-04-05 RX ORDER — SITAGLIPTIN AND METFORMIN HYDROCHLORIDE 1000; 100 MG/1; MG/1
1 TABLET, FILM COATED, EXTENDED RELEASE ORAL
Qty: 90 TABLET | Refills: 1 | Status: SHIPPED | OUTPATIENT
Start: 2024-04-05

## 2024-05-10 DIAGNOSIS — R52 PAIN: ICD-10-CM

## 2024-05-10 DIAGNOSIS — J20.9 COPD (CHRONIC OBSTRUCTIVE PULMONARY DISEASE) WITH ACUTE BRONCHITIS  (HCC): ICD-10-CM

## 2024-05-10 DIAGNOSIS — J44.0 COPD (CHRONIC OBSTRUCTIVE PULMONARY DISEASE) WITH ACUTE BRONCHITIS  (HCC): ICD-10-CM

## 2024-05-10 DIAGNOSIS — M54.50 LOW BACK PAIN WITHOUT SCIATICA, UNSPECIFIED BACK PAIN LATERALITY, UNSPECIFIED CHRONICITY: ICD-10-CM

## 2024-05-10 RX ORDER — METHOCARBAMOL 750 MG/1
750 TABLET, FILM COATED ORAL EVERY 6 HOURS PRN
Qty: 30 TABLET | Refills: 0 | Status: SHIPPED | OUTPATIENT
Start: 2024-05-10

## 2024-05-10 RX ORDER — HYDROCODONE BITARTRATE AND ACETAMINOPHEN 5; 325 MG/1; MG/1
1 TABLET ORAL EVERY 6 HOURS PRN
Qty: 30 TABLET | Refills: 0 | Status: SHIPPED | OUTPATIENT
Start: 2024-05-10

## 2024-05-10 RX ORDER — ALBUTEROL SULFATE 90 UG/1
2 AEROSOL, METERED RESPIRATORY (INHALATION) EVERY 6 HOURS PRN
Qty: 18 G | Refills: 5 | Status: SHIPPED | OUTPATIENT
Start: 2024-05-10

## 2024-06-03 DIAGNOSIS — R52 PAIN: ICD-10-CM

## 2024-06-03 DIAGNOSIS — M54.50 LOW BACK PAIN WITHOUT SCIATICA, UNSPECIFIED BACK PAIN LATERALITY, UNSPECIFIED CHRONICITY: ICD-10-CM

## 2024-06-04 RX ORDER — HYDROCODONE BITARTRATE AND ACETAMINOPHEN 5; 325 MG/1; MG/1
1 TABLET ORAL EVERY 6 HOURS PRN
Qty: 30 TABLET | Refills: 0 | Status: SHIPPED | OUTPATIENT
Start: 2024-06-04

## 2024-06-04 RX ORDER — METHOCARBAMOL 750 MG/1
750 TABLET, FILM COATED ORAL EVERY 6 HOURS PRN
Qty: 30 TABLET | Refills: 0 | Status: SHIPPED | OUTPATIENT
Start: 2024-06-04

## 2024-07-03 DIAGNOSIS — E11.8 TYPE 2 DIABETES MELLITUS WITH COMPLICATION, WITHOUT LONG-TERM CURRENT USE OF INSULIN (HCC): ICD-10-CM

## 2024-07-03 DIAGNOSIS — R52 PAIN: ICD-10-CM

## 2024-07-03 DIAGNOSIS — M54.50 LOW BACK PAIN WITHOUT SCIATICA, UNSPECIFIED BACK PAIN LATERALITY, UNSPECIFIED CHRONICITY: ICD-10-CM

## 2024-07-03 RX ORDER — HYDROCODONE BITARTRATE AND ACETAMINOPHEN 5; 325 MG/1; MG/1
1 TABLET ORAL EVERY 6 HOURS PRN
Qty: 30 TABLET | Refills: 0 | Status: SHIPPED | OUTPATIENT
Start: 2024-07-03

## 2024-07-03 RX ORDER — SITAGLIPTIN AND METFORMIN HYDROCHLORIDE 1000; 100 MG/1; MG/1
1 TABLET, FILM COATED, EXTENDED RELEASE ORAL
Qty: 100 TABLET | Refills: 1 | Status: SHIPPED | OUTPATIENT
Start: 2024-07-03

## 2024-07-03 RX ORDER — METHOCARBAMOL 750 MG/1
750 TABLET, FILM COATED ORAL EVERY 6 HOURS PRN
Qty: 30 TABLET | Refills: 1 | Status: SHIPPED | OUTPATIENT
Start: 2024-07-03

## 2024-07-03 RX ORDER — GLIMEPIRIDE 1 MG/1
1 TABLET ORAL 2 TIMES DAILY
Qty: 180 TABLET | Refills: 0 | OUTPATIENT
Start: 2024-07-03

## 2024-07-08 LAB
LEFT EYE DIABETIC RETINOPATHY: NORMAL
RIGHT EYE DIABETIC RETINOPATHY: NORMAL

## 2024-07-15 ENCOUNTER — OFFICE VISIT (OUTPATIENT)
Dept: PAIN MEDICINE | Facility: CLINIC | Age: 69
End: 2024-07-15
Payer: MEDICARE

## 2024-07-15 ENCOUNTER — TELEPHONE (OUTPATIENT)
Dept: PAIN MEDICINE | Facility: CLINIC | Age: 69
End: 2024-07-15

## 2024-07-15 VITALS
BODY MASS INDEX: 32.53 KG/M2 | DIASTOLIC BLOOD PRESSURE: 68 MMHG | TEMPERATURE: 97.6 F | HEIGHT: 70 IN | SYSTOLIC BLOOD PRESSURE: 106 MMHG | WEIGHT: 227.2 LBS | RESPIRATION RATE: 16 BRPM

## 2024-07-15 DIAGNOSIS — G89.4 CHRONIC PAIN SYNDROME: ICD-10-CM

## 2024-07-15 DIAGNOSIS — M51.27 HERNIATED NUCLEUS PULPOSUS, L5-S1: ICD-10-CM

## 2024-07-15 DIAGNOSIS — M48.061 LUMBAR FORAMINAL STENOSIS: ICD-10-CM

## 2024-07-15 DIAGNOSIS — M54.16 LUMBAR RADICULOPATHY: Primary | ICD-10-CM

## 2024-07-15 DIAGNOSIS — M54.16 LUMBAR RADICULOPATHY: ICD-10-CM

## 2024-07-15 PROCEDURE — G2211 COMPLEX E/M VISIT ADD ON: HCPCS | Performed by: NURSE PRACTITIONER

## 2024-07-15 PROCEDURE — 99214 OFFICE O/P EST MOD 30 MIN: CPT | Performed by: NURSE PRACTITIONER

## 2024-07-15 RX ORDER — GABAPENTIN 400 MG/1
CAPSULE ORAL
Qty: 360 CAPSULE | Refills: 1 | Status: SHIPPED | OUTPATIENT
Start: 2024-07-15 | End: 2024-07-15 | Stop reason: SDUPTHER

## 2024-07-15 RX ORDER — GABAPENTIN 400 MG/1
CAPSULE ORAL
Qty: 360 CAPSULE | Refills: 1 | Status: SHIPPED | OUTPATIENT
Start: 2024-07-15

## 2024-07-15 NOTE — PROGRESS NOTES
Assessment:  1. Lumbar radiculopathy    2. Lumbar foraminal stenosis    3. Herniated nucleus pulposus, L5-S1    4. Chronic pain syndrome        Plan:  The patient is a 68 y.o. male last seen on 1/29/24 who presents for a follow up office visit in regards to chronic pain secondary tolow back pain, lumbar stenosis, lumbar herniation with radiculopathy and left hip osteoarthritis.  The patient presents today with ongoing pain which is located in the posterior aspect of the left thigh.  He did experience 1 flareup of pain since the last office visit after tripping while walking up the steps at a hockey game.  He went to the emergency room for the pain, where imaging was performed and showed no changes. the pain did resolve on its own.  He continues to take gabapentin 400 mg 1 tablet in the morning 1 tablet in the afternoon and 2 tabs at night which is providing 60% pain relief without side effects.  Therefore, I will continue him on the medication as prescribed and refills for gabapentin 400 mg were sent to the pharmacy.        The patient will follow-up in 6 months for medication prescription refill and reevaluation. The patient was advised to contact the office should their symptoms worsen in the interim. The patient was agreeable and verbalized an understanding.        History of Present Illness:    The patient is a 68 y.o. male last seen on 1/29/24 who presents for a follow up office visit in regards to chronic pain secondary tolow back pain, lumbar stenosis, lumbar herniation with radiculopathy and left hip osteoarthritis. The patient's last office visit was January 29, 2024 in which the gabapentin was increased to 400 mg 1 tab in the morning 1 tab in the afternoon and 2 tablets at night.  Patient presents today with ongoing pain that is located in the posterior aspect of the left thigh.  The pain is intermittent but worse in the morning.  He describes it as sharp and shooting.  He is rating his pain a 3/10 on the  numeric rating scale    Since the last office visit, the patient states he twisted his leg when walking up the stairs at a hockey game.  The next day he was unable to ambulate so he called the emergency room and was taken to the ER on April 1, 2024.  CAT scan of the lumbar spine was performed which showed no significant changes.  Stable mild to moderate facet arthropathy most pronounced at L5-S1 stable moderate bilateral neuroforaminal stenosis at L5-S1 left greater than right.    He is taking gabapentin 400 mg 1 tablet in the morning 1 tablet in the afternoon and 2 tablets at night which is providing 60% pain relief without side effects.  his Primary care physician prescribes him Norco 5/325 mg 1 tab every 6 hours and methocarbamol 750 mg 1 tablet every 6 hours       The patient had underwent a left L5 and S1 transforaminal epidural steroid injection in 2021 (15% relief) and a left hip injection in 2020 (30-40% relief for 2 weeks)     I have personally reviewed and/or updated the patient's past medical history, past surgical history, family history, social history, current medications, allergies, and vital signs today.       Review of Systems:    Review of Systems   Respiratory:  Negative for shortness of breath.    Cardiovascular:  Negative for chest pain.   Gastrointestinal:  Negative for constipation, diarrhea, nausea and vomiting.   Musculoskeletal:  Negative for arthralgias, gait problem, joint swelling and myalgias.        Decreased ROM   Skin:  Negative for rash.   Neurological:  Negative for dizziness, seizures and weakness.   All other systems reviewed and are negative.        Past Medical History:   Diagnosis Date    Allergic 1961    COPD (chronic obstructive pulmonary disease) ([facility]) 2005    Coronary artery disease     CPAP (continuous positive airway pressure) dependence 12/18/2023    Diabetes mellitus ([facility]) 2008    Heart disease     Hypertension 2005    Myocardial infarction ([facility]) 2005    Old myocardial  infarction 12/18/2023    Sleep apnea 12/18/2023    Tear of medial meniscus of left knee, subsequent encounter     RESOLVE: [id number]       Past Surgical History:   Procedure Laterality Date    APPENDECTOMY      CORONARY ANGIOPLASTY      CORONARY ARTERY BYPASS GRAFT      KNEE ARTHROSCOPY      KNEE SURGERY Right     TONSILLECTOMY AND ADENOIDECTOMY      UMBILICAL HERNIA REPAIR         Family History   Adopted: Yes       Social History     Occupational History    Occupation: WORKING FULL TIME    Tobacco Use    Smoking status: Former     Current packs/day: 0.00     Average packs/day: 1 pack/day for 27.7 years (27.7 ttl pk-yrs)     Types: Cigarettes     Start date: 1/16/1986     Quit date: 10/2/2013     Years since quitting: 10.7     Passive exposure: Current    Smokeless tobacco: Never    Tobacco comments:     FORMER SMOKER AS PER ALL SCRIPTS    Vaping Use    Vaping status: Former   Substance and Sexual Activity    Alcohol use: Not Currently     Comment: rare    Drug use: Not Currently    Sexual activity: Yes     Partners: Female     Birth control/protection: None         Current Outpatient Medications:     albuterol (PROVENTIL HFA,VENTOLIN HFA) 90 mcg/act inhaler, Inhale 2 puffs every 6 (six) hours as needed for wheezing, Disp: 18 g, Rfl: 5    atenolol (TENORMIN) 25 mg tablet, Take 1 tablet by mouth daily Half tablet daily 12.5mg, Disp: , Rfl: 1    Blood Glucose Monitoring Suppl (BLOOD GLUCOSE MONITOR SYSTEM) w/Device KIT, by Does not apply route 2 (two) times a day, Disp: 100 each, Rfl: 3    clopidogrel (PLAVIX) 75 mg tablet, Take 1 tablet (75 mg total) by mouth daily, Disp: 90 tablet, Rfl: 1    ezetimibe-simvastatin (VYTORIN) 10-40 mg per tablet, Take 1 tablet by mouth daily, Disp: , Rfl:     gabapentin (NEURONTIN) 400 mg capsule, Take 1 tab in the a.m., 1 in the afternoon, and 2 tabs at night, Disp: 360 capsule, Rfl: 1    glimepiride (AMARYL) 1 mg tablet, Take 1 tablet (1 mg total) by mouth 2 (two) times a day, Disp:  "180 tablet, Rfl: 1    HYDROcodone-acetaminophen (NORCO) 5-325 mg per tablet, Take 1 tablet by mouth every 6 (six) hours as needed for pain Max Daily Amount: 4 tablets, Disp: 30 tablet, Rfl: 0    methocarbamol (ROBAXIN) 750 mg tablet, Take 1 tablet (750 mg total) by mouth every 6 (six) hours as needed for muscle spasms, Disp: 30 tablet, Rfl: 1    nitroglycerin (NITROLINGUAL) 0.4 mg/spray spray, As needed, Disp: , Rfl:     QUEtiapine (SEROquel) 100 mg tablet, Take 1 tablet by mouth daily, Disp: , Rfl:     sildenafil (VIAGRA) 100 mg tablet, as needed , Disp: , Rfl: 1    SITagliptin-metFORMIN HCl ER (Janumet XR) 100-1000 MG TB24, Take 1 tablet by mouth daily with dinner, Disp: 100 tablet, Rfl: 1    Blood Glucose Monitoring Suppl ([employer] CONTOUR MONITOR) MIGUEL ANGEL, by Does not apply route 2 (two) times a day (Patient not taking: Reported on 3/1/2024), Disp: 1 Device, Rfl: 0    clindamycin (CLEOCIN) 150 mg capsule, Take 150 mg by mouth every 6 (six) hours (Patient not taking: Reported on 3/1/2024), Disp: , Rfl:     Allergies   Allergen Reactions    Penicillins Other (See Comments)     hives-emily cefazolin       Physical Exam:    /68 (BP Location: Right arm, Patient Position: Sitting, Cuff Size: Adult)   Temp 97.6 °F (36.4 °C) (Temporal)   Resp 16   Ht 5' 10\" (1.778 m)   Wt 103 kg (227 lb 3.2 oz)   BMI 32.60 kg/m²     Constitutional:normal, well developed, well nourished, alert, in no distress and non-toxic and no overt pain behavior.  Eyes:anicteric  HEENT:grossly intact  Neck:supple, symmetric, trachea midline and no masses   Pulmonary:even and unlabored  Cardiovascular:No edema or pitting edema present  Skin:Normal without rashes or lesions and well hydrated  Psychiatric:Mood and affect appropriate  Neurologic:Cranial Nerves II-XII grossly intact  Musculoskeletal:normal      Imaging  CT LUMBAR SPINE     INDICATION:   M25.552: Pain in left hip  M54.40: Lumbago with sciatica, unspecified side  M54.16: Radiculopathy, " lumbar region.     COMPARISON: 10/22/2020.     TECHNIQUE:  Contiguous axial images through the lumbar spine were obtained. Sagittal and coronal reconstructions were performed.       Radiation dose length product (DLP) for this visit:  1497 mGy-cm .  This examination, like all CT scans performed in the [facility], was performed utilizing techniques to minimize radiation dose exposure, including the use of iterative   reconstruction and automated exposure control.       IMAGE QUALITY:  Diagnostic.     FINDINGS:     ALIGNMENT:  There are 5 lumbar type vertebral bodies.  No significant spondylolisthesis.     VERTEBRAL BODIES:  Stable anterior wedging of the T11-L1 vertebral bodies with Schmorl's nodes along the superior endplates.  No acute vertebral body fractures identified.     DEGENERATIVE CHANGES:     Lower Thoracic spine:  Normal lower thoracic disc spaces.]     L1-2:  Vacuum disc phenomenon.  Disc space degeneration and narrowing.  Dorsal osteophytosis.  Facet arthrosis.  No significant canal stenosis or foraminal narrowing.     L2-3:  No significant canal stenosis or foraminal narrowing.     L3-4:  No significant canal stenosis or foraminal narrowing.  Facet arthrosis.     L4-5:  Minimal bulge.  Facet arthrosis.  No significant canal stenosis or foraminal narrowing.     L5-S1:  Disc space narrowing.  Vacuum disc phenomenon.  Ventral and dorsal osteophytosis.  Marginal osteophytosis is present.  Facet arthrosis.  Severe left and mild right foraminal narrowing.     PARASPINAL SOFT TISSUES:  Right adrenal gland adenoma again noted.     IMPRESSION:     Multilevel degenerative changes of the lumbar spine, as described above.  Severe left foraminal narrowing is noted at L5-S1.     Stable chronic anterior wedging of the T11-L1 vertebral bodies.  No orders to display           No orders to display         No orders of the defined types were placed in this encounter.

## 2024-07-15 NOTE — ADDENDUM NOTE
Addended by: TY BALDERRAMA on: 7/15/2024 08:12 AM     Modules accepted: Orders, Level of Service

## 2024-07-15 NOTE — TELEPHONE ENCOUNTER
S/w pharmacist, confirmed rx of today has not been received. Called in gabapentin 400 mg, 1 pill am, 1 pill pm, 2 pills hs #360 / 1 per NM. TUNDE, cb number and writer's name provided. Pharmacist verbalized understanding and appreciation.

## 2024-07-15 NOTE — TELEPHONE ENCOUNTER
Can you contact this patient's pharmacy to see if they received a prescription for gabapentin today?  I keep getting a prescribing error every time I send try and send it.  If they did not would you mind calling it in. Thank you

## 2024-08-07 DIAGNOSIS — M54.50 LOW BACK PAIN WITHOUT SCIATICA, UNSPECIFIED BACK PAIN LATERALITY, UNSPECIFIED CHRONICITY: ICD-10-CM

## 2024-08-07 DIAGNOSIS — R52 PAIN: ICD-10-CM

## 2024-08-07 RX ORDER — METHOCARBAMOL 750 MG/1
750 TABLET, FILM COATED ORAL EVERY 6 HOURS PRN
Qty: 30 TABLET | Refills: 1 | Status: SHIPPED | OUTPATIENT
Start: 2024-08-07

## 2024-08-07 RX ORDER — HYDROCODONE BITARTRATE AND ACETAMINOPHEN 5; 325 MG/1; MG/1
1 TABLET ORAL EVERY 6 HOURS PRN
Qty: 30 TABLET | Refills: 0 | Status: SHIPPED | OUTPATIENT
Start: 2024-08-07

## 2024-09-11 DIAGNOSIS — R52 PAIN: ICD-10-CM

## 2024-09-11 DIAGNOSIS — M54.50 LOW BACK PAIN WITHOUT SCIATICA, UNSPECIFIED BACK PAIN LATERALITY, UNSPECIFIED CHRONICITY: ICD-10-CM

## 2024-09-11 RX ORDER — METHOCARBAMOL 750 MG/1
750 TABLET, FILM COATED ORAL EVERY 6 HOURS PRN
Qty: 30 TABLET | Refills: 1 | Status: SHIPPED | OUTPATIENT
Start: 2024-09-11

## 2024-09-11 RX ORDER — HYDROCODONE BITARTRATE AND ACETAMINOPHEN 5; 325 MG/1; MG/1
1 TABLET ORAL EVERY 6 HOURS PRN
Qty: 30 TABLET | Refills: 0 | Status: SHIPPED | OUTPATIENT
Start: 2024-09-11

## 2024-09-13 ENCOUNTER — OFFICE VISIT (OUTPATIENT)
Dept: FAMILY MEDICINE CLINIC | Facility: CLINIC | Age: 69
End: 2024-09-13
Payer: MEDICARE

## 2024-09-13 VITALS
HEART RATE: 57 BPM | TEMPERATURE: 98.2 F | HEIGHT: 70 IN | OXYGEN SATURATION: 98 % | SYSTOLIC BLOOD PRESSURE: 124 MMHG | RESPIRATION RATE: 16 BRPM | WEIGHT: 221.8 LBS | DIASTOLIC BLOOD PRESSURE: 82 MMHG | BODY MASS INDEX: 31.75 KG/M2

## 2024-09-13 DIAGNOSIS — Z00.00 MEDICARE ANNUAL WELLNESS VISIT, SUBSEQUENT: Primary | ICD-10-CM

## 2024-09-13 DIAGNOSIS — I35.0 AORTIC VALVE STENOSIS, ETIOLOGY OF CARDIAC VALVE DISEASE UNSPECIFIED: ICD-10-CM

## 2024-09-13 DIAGNOSIS — E11.9 CONTROLLED TYPE 2 DIABETES MELLITUS WITHOUT COMPLICATION, WITHOUT LONG-TERM CURRENT USE OF INSULIN (HCC): ICD-10-CM

## 2024-09-13 DIAGNOSIS — Z12.5 SCREENING FOR PROSTATE CANCER: ICD-10-CM

## 2024-09-13 DIAGNOSIS — E78.5 HYPERLIPIDEMIA, UNSPECIFIED HYPERLIPIDEMIA TYPE: ICD-10-CM

## 2024-09-13 DIAGNOSIS — Z79.899 HIGH RISK MEDICATION USE: ICD-10-CM

## 2024-09-13 DIAGNOSIS — I10 ESSENTIAL HYPERTENSION: ICD-10-CM

## 2024-09-13 PROCEDURE — G0438 PPPS, INITIAL VISIT: HCPCS | Performed by: FAMILY MEDICINE

## 2024-09-13 NOTE — PROGRESS NOTES
Ambulatory Visit  Name: Cristino Odom      : 1955      MRN: [id number]  Encounter Provider: Leo Bliss DO  Encounter Date: 2024   Encounter department: [facility]    Assessment & Plan  Medicare annual wellness visit, subsequent         Controlled type 2 diabetes mellitus without complication, without long-term current use of insulin (HCC)    Lab Results   Component Value Date    HGBA1C 7.0 (A) 2024     Orders:    Hemoglobin A1C; Future    Albumin / creatinine urine ratio; Future    Comprehensive metabolic panel; Future    Labs ordered  Patient will continue his current medication  His sugar when last checked was under control      Hyperlipidemia, unspecified hyperlipidemia type    Orders:    Lipid panel; Future  Lipid panel ordered  Continue his Vytorin  Screening for prostate cancer    Orders:    PSA, Total Screen; Future    UA (URINE) with reflex to Scope; Future    High risk medication use    Orders:    CBC and differential; Future    Comprehensive metabolic panel; Future    UA (URINE) with reflex to Scope; Future    Aortic valve stenosis, etiology of cardiac valve disease unspecified    Orders:    Echo complete w/ contrast if indicated; Future  Patient follows with cardiology but did not get his echo done this year  Echocardiogram ordered  Essential hypertension  Patient's blood pressure under control  Continue current medications       Follow-up in 6 months or sooner if needed      Depression Screening and Follow-up Plan: Patient was screened for depression during today's encounter. They screened negative with a PHQ-2 score of 0.      Preventive health issues were discussed with patient, and age appropriate screening tests were ordered as noted in patient's After Visit Summary. Personalized health advice and appropriate referrals for health education or preventive services given if needed, as noted in patient's After Visit Summary.    History of Present Illness      Patient presents today for checkup chronic conditions  Patient would like a digital rectal exam today to screen for prostate cancer  Otherwise he is feeling well with no acute complaints       Patient Care Team:  Leo Bliss DO as PCP - General  Christie Garcia MD    Review of Systems   Constitutional: Negative.    HENT: Negative.     Eyes: Negative.    Respiratory: Negative.     Cardiovascular: Negative.    Gastrointestinal: Negative.    Endocrine: Negative.    Genitourinary: Negative.    Musculoskeletal: Negative.    Skin: Negative.    Allergic/Immunologic: Negative.    Neurological: Negative.    Hematological: Negative.    Psychiatric/Behavioral: Negative.     All other systems reviewed and are negative.    Medical History Reviewed by provider this encounter:       Annual Wellness Visit Questionnaire   Cristino is here for his Subsequent Wellness visit. Last Medicare Wellness visit information reviewed, patient interviewed and updates made to the record today.      Health Risk Assessment:   Patient rates overall health as good. Patient feels that their physical health rating is same. Patient is satisfied with their life. Eyesight was rated as same. Hearing was rated as same. Patient feels that their emotional and mental health rating is same. Patients states they are sometimes angry. Patient states they are sometimes unusually tired/fatigued. Pain experienced in the last 7 days has been some. Patient's pain rating has been 5/10. Patient states that he has experienced no weight loss or gain in last 6 months.     Depression Screening:   PHQ-2 Score: 0      Fall Risk Screening:   In the past year, patient has experienced: no history of falling in past year      Home Safety:  Patient does not have trouble with stairs inside or outside of their home. Patient has working smoke alarms and has working carbon monoxide detector. Home safety hazards include: none.     Nutrition:   Current diet is Limited junk food.      Medications:   Patient is not currently taking any over-the-counter supplements. Patient is able to manage medications.     Activities of Daily Living (ADLs)/Instrumental Activities of Daily Living (IADLs):   Walk and transfer into and out of bed and chair?: Yes  Dress and groom yourself?: Yes    Bathe or shower yourself?: Yes    Feed yourself? Yes  Do your laundry/housekeeping?: Yes  Manage your money, pay your bills and track your expenses?: Yes  Make your own meals?: Yes    Do your own shopping?: Yes    Durable Medical Equipment Suppliers  Bi Pap machine    Previous Hospitalizations:   Any hospitalizations or ED visits within the last 12 months?: Yes    How many hospitalizations have you had in the last year?: 1-2    Advance Care Planning:   Living will: Yes    Durable POA for healthcare: Yes    Advanced directive: Yes    Advanced directive counseling given: Yes    End of Life Decisions reviewed with patient: Yes    Provider agrees with end of life decisions: Yes      Cognitive Screening:   Provider or family/friend/caregiver concerned regarding cognition?: No    PREVENTIVE SCREENINGS      Cardiovascular Screening:    General: History Lipid Disorder and Screening Current      Diabetes Screening:     General: History Diabetes and Screening Current      Colorectal Cancer Screening:     General: Screening Current      Prostate Cancer Screening:    General: Screening Current      Osteoporosis Screening:    General: Screening Not Indicated      Abdominal Aortic Aneurysm (AAA) Screening:    Risk factors include: age between 65-76 yo and tobacco use        General: Screening Current      Lung Cancer Screening:     General: Screening Current      Hepatitis C Screening:    General: Screening Current    Screening, Brief Intervention, and Referral to Treatment (SBIRT)    Screening  Typical number of drinks in a day: 0  Typical number of drinks in a week: 0  Interpretation: Low risk drinking behavior.    AUDIT-C  Screenin) How often did you have a drink containing alcohol in the past year? never  2) How many drinks did you have on a typical day when you were drinking in the past year? 0  3) How often did you have 6 or more drinks on one occasion in the past year? never    AUDIT-C Score: 0  Interpretation: Score 0-3 (male): Negative screen for alcohol misuse    Single Item Drug Screening:  How often have you used an illegal drug (including marijuana) or a prescription medication for non-medical reasons in the past year? never    Single Item Drug Screen Score: 0  Interpretation: Negative screen for possible drug use disorder    Brief Intervention  Alcohol & drug use screenings were reviewed. No concerns regarding substance use disorder identified.     Review of Current Opioid Use    Opioid Risk Tool (ORT) Interpretation: Complete Opioid Risk Tool (ORT)    [place]MP or [place]  reviewed. No red flags were identified    Other Counseling Topics:   Car/seat belt/driving safety, skin self-exam, sunscreen and calcium and vitamin D intake and regular weightbearing exercise.     Social Determinants of Health     Financial Resource Strain: Medium Risk (2023)    Overall Financial Resource Strain (CARDIA)     Difficulty of Paying Living Expenses: Somewhat hard   Food Insecurity: No Food Insecurity (2024)    Hunger Vital Sign     Worried About Running Out of Food in the Last Year: Never true     Ran Out of Food in the Last Year: Never true   Transportation Needs: No Transportation Needs (2024)    PRAPARE - Transportation     Lack of Transportation (Medical): No     Lack of Transportation (Non-Medical): No   Housing Stability: Low Risk  (2024)    Housing Stability Vital Sign     Unable to Pay for Housing in the Last Year: No     Number of Times Moved in the Last Year: 0     Homeless in the Last Year: No   Utilities: Not At Risk (2024)    [place] Utilities     Threatened with loss of utilities: No     No results  "found.    Objective     /82 (BP Location: Left arm, Patient Position: Sitting, Cuff Size: Large)   Pulse 57   Temp 98.2 °F (36.8 °C) (Tympanic)   Resp 16   Ht 5' 10\" (1.778 m)   Wt 101 kg (221 lb 12.8 oz)   SpO2 98%   BMI 31.82 kg/m²     Physical Exam  Vitals and nursing note reviewed.   Constitutional:       Appearance: Normal appearance. He is well-developed.   HENT:      Head: Normocephalic.      Right Ear: External ear normal.      Left Ear: External ear normal.      Nose: Nose normal.   Eyes:      Conjunctiva/sclera: Conjunctivae normal.      Pupils: Pupils are equal, round, and reactive to light.   Cardiovascular:      Rate and Rhythm: Normal rate and regular rhythm.      Heart sounds: Normal heart sounds.   Pulmonary:      Effort: Pulmonary effort is normal.      Breath sounds: Normal breath sounds.   Abdominal:      General: Bowel sounds are normal.      Palpations: Abdomen is soft.   Musculoskeletal:         General: Normal range of motion.      Cervical back: Normal range of motion and neck supple.   Skin:     General: Skin is warm and dry.   Neurological:      General: No focal deficit present.      Mental Status: He is alert and oriented to person, place, and time.   Psychiatric:         Behavior: Behavior normal.         Thought Content: Thought content normal.         Judgment: Judgment normal.         "

## 2024-09-13 NOTE — ASSESSMENT & PLAN NOTE
Patient's blood pressure under control  Continue current medications       Follow-up in 6 months or sooner if needed

## 2024-09-13 NOTE — PATIENT INSTRUCTIONS
Medicare Preventive Visit Patient Instructions  Thank you for completing your Welcome to Medicare Visit or Medicare Annual Wellness Visit today. Your next wellness visit will be due in one year (9/14/2025).  The screening/preventive services that you may require over the next 5-10 years are detailed below. Some tests may not apply to you based off risk factors and/or age. Screening tests ordered at today's visit but not completed yet may show as past due. Also, please note that scanned in results may not display below.  Preventive Screenings:  Service Recommendations Previous Testing/Comments   Colorectal Cancer Screening  Colonoscopy    Fecal Occult Blood Test (FOBT)/Fecal Immunochemical Test (FIT)  Fecal DNA/Cologuard Test  Flexible Sigmoidoscopy Age: 45-75 years old   Colonoscopy: every 10 years (May be performed more frequently if at higher risk)  OR  FOBT/FIT: every 1 year  OR  Cologuard: every 3 years  OR  Sigmoidoscopy: every 5 years  Screening may be recommended earlier than age 45 if at higher risk for colorectal cancer. Also, an individualized decision between you and your healthcare provider will decide whether screening between the ages of 76-85 would be appropriate. Colonoscopy: 12/18/2023  FOBT/FIT: Not on file  Cologuard: Not on file  Sigmoidoscopy: Not on file          Prostate Cancer Screening Individualized decision between patient and health care provider in men between ages of 55-69   Medicare will cover every 12 months beginning on the day after your 50th birthday PSA: 0.41 ng/mL           Hepatitis C Screening Once for adults born between 1945 and 1965  More frequently in patients at high risk for Hepatitis C Hep C Antibody: 10/12/2019        Diabetes Screening 1-2 times per year if you're at risk for diabetes or have pre-diabetes Fasting glucose: 126 mg/dL (11/4/2023)  A1C: 7.0 (3/1/2024)      Cholesterol Screening Once every 5 years if you don't have a lipid disorder. May order more often  based on risk factors. Lipid panel: 11/04/2023         Other Preventive Screenings Covered by Medicare:  Abdominal Aortic Aneurysm (AAA) Screening: covered once if your at risk. You're considered to be at risk if you have a family history of AAA or a male between the age of 65-75 who smoking at least 100 cigarettes in your lifetime.  Lung Cancer Screening: covers low dose CT scan once per year if you meet all of the following conditions: (1) Age 55-77; (2) No signs or symptoms of lung cancer; (3) Current smoker or have quit smoking within the last 15 years; (4) You have a tobacco smoking history of at least 20 pack years (packs per day x number of years you smoked); (5) You get a written order from a healthcare provider.  Glaucoma Screening: covered annually if you're considered high risk: (1) You have diabetes OR (2) Family history of glaucoma OR (3)  aged 50 and older OR (4)  American aged 65 and older  Osteoporosis Screening: covered every 2 years if you meet one of the following conditions: (1) Have a vertebral abnormality; (2) On glucocorticoid therapy for more than 3 months; (3) Have primary hyperparathyroidism; (4) On osteoporosis medications and need to assess response to drug therapy.  HIV Screening: covered annually if you're between the age of 15-65. Also covered annually if you are younger than 15 and older than 65 with risk factors for HIV infection. For pregnant patients, it is covered up to 3 times per pregnancy.    Immunizations:  Immunization Recommendations   Influenza Vaccine Annual influenza vaccination during flu season is recommended for all persons aged >= 6 months who do not have contraindications   Pneumococcal Vaccine   * Pneumococcal conjugate vaccine = PCV13 (Prevnar 13), PCV15 (Vaxneuvance), PCV20 (Prevnar 20)  * Pneumococcal polysaccharide vaccine = PPSV23 (Pneumovax) Adults 19-63 yo with certain risk factors or if 65+ yo  If never received any pneumonia vaccine:  recommend Prevnar 20 (PCV20)  Give PCV20 if previously received 1 dose of PCV13 or PPSV23   Hepatitis B Vaccine 3 dose series if at intermediate or high risk (ex: diabetes, end stage renal disease, liver disease)   Respiratory syncytial virus (RSV) Vaccine - COVERED BY MEDICARE PART D  * RSVPreF3 (Arexvy) CDC recommends that adults 60 years of age and older may receive a single dose of RSV vaccine using shared clinical decision-making (SCDM)   Tetanus (Td) Vaccine - COST NOT COVERED BY MEDICARE PART B Following completion of primary series, a booster dose should be given every 10 years to maintain immunity against tetanus. Td may also be given as tetanus wound prophylaxis.   Tdap Vaccine - COST NOT COVERED BY MEDICARE PART B Recommended at least once for all adults. For pregnant patients, recommended with each pregnancy.   Shingles Vaccine (Shingrix) - COST NOT COVERED BY MEDICARE PART B  2 shot series recommended in those 19 years and older who have or will have weakened immune systems or those 50 years and older     Health Maintenance Due:      Topic Date Due   • Lung Cancer Screening  02/19/2022   • Colorectal Cancer Screening  12/17/2026   • Hepatitis C Screening  Completed     Immunizations Due:      Topic Date Due   • COVID-19 Vaccine (6 - 2023-24 season) 09/01/2024   • Influenza Vaccine (1) 09/01/2024     Advance Directives   What are advance directives?  Advance directives are legal documents that state your wishes and plans for medical care. These plans are made ahead of time in case you lose your ability to make decisions for yourself. Advance directives can apply to any medical decision, such as the treatments you want, and if you want to donate organs.   What are the types of advance directives?  There are many types of advance directives, and each state has rules about how to use them. You may choose a combination of any of the following:  Living will:  This is a written record of the treatment you  want. You can also choose which treatments you do not want, which to limit, and which to stop at a certain time. This includes surgery, medicine, IV fluid, and tube feedings.   Durable power of  for healthcare (DPAHC):  This is a written record that states who you want to make healthcare choices for you when you are unable to make them for yourself. This person, called a proxy, is usually a family member or a friend. You may choose more than 1 proxy.  Do not resuscitate (DNR) order:  A DNR order is used in case your heart stops beating or you stop breathing. It is a request not to have certain forms of treatment, such as CPR. A DNR order may be included in other types of advance directives.  Medical directive:  This covers the care that you want if you are in a coma, near death, or unable to make decisions for yourself. You can list the treatments you want for each condition. Treatment may include pain medicine, surgery, blood transfusions, dialysis, IV or tube feedings, and a ventilator (breathing machine).  Values history:  This document has questions about your views, beliefs, and how you feel and think about life. This information can help others choose the care that you would choose.  Why are advance directives important?  An advance directive helps you control your care. Although spoken wishes may be used, it is better to have your wishes written down. Spoken wishes can be misunderstood, or not followed. Treatments may be given even if you do not want them. An advance directive may make it easier for your family to make difficult choices about your care.   Weight Management   Why it is important to manage your weight:  Being overweight increases your risk of health conditions such as heart disease, high blood pressure, type 2 diabetes, and certain types of cancer. It can also increase your risk for osteoarthritis, sleep apnea, and other respiratory problems. Aim for a slow, steady weight loss. Even a  small amount of weight loss can lower your risk of health problems.  How to lose weight safely:  A safe and healthy way to lose weight is to eat fewer calories and get regular exercise. You can lose up about 1 pound a week by decreasing the number of calories you eat by 500 calories each day.   Healthy meal plan for weight management:  A healthy meal plan includes a variety of foods, contains fewer calories, and helps you stay healthy. A healthy meal plan includes the following:  Eat whole-grain foods more often.  A healthy meal plan should contain fiber. Fiber is the part of grains, fruits, and vegetables that is not broken down by your body. Whole-grain foods are healthy and provide extra fiber in your diet. Some examples of whole-grain foods are whole-wheat breads and pastas, oatmeal, brown rice, and bulgur.  Eat a variety of vegetables every day.  Include dark, leafy greens such as spinach, kale, ashvin greens, and mustard greens. Eat yellow and orange vegetables such as carrots, sweet potatoes, and winter squash.   Eat a variety of fruits every day.  Choose fresh or canned fruit (canned in its own juice or light syrup) instead of juice. Fruit juice has very little or no fiber.  Eat low-fat dairy foods.  Drink fat-free (skim) milk or 1% milk. Eat fat-free yogurt and low-fat cottage cheese. Try low-fat cheeses such as mozzarella and other reduced-fat cheeses.  Choose meat and other protein foods that are low in fat.  Choose beans or other legumes such as split peas or lentils. Choose fish, skinless poultry (chicken or turkey), or lean cuts of red meat (beef or pork). Before you cook meat or poultry, cut off any visible fat.   Use less fat and oil.  Try baking foods instead of frying them. Add less fat, such as margarine, sour cream, regular salad dressing and mayonnaise to foods. Eat fewer high-fat foods. Some examples of high-fat foods include french fries, doughnuts, ice cream, and cakes.  Eat fewer sweets.   Limit foods and drinks that are high in sugar. This includes candy, cookies, regular soda, and sweetened drinks.  Exercise:  Exercise at least 30 minutes per day on most days of the week. Some examples of exercise include walking, biking, dancing, and swimming. You can also fit in more physical activity by taking the stairs instead of the elevator or parking farther away from stores. Ask your healthcare provider about the best exercise plan for you.      © Copyright [employer] 2018 Information is for End User's use only and may not be sold, redistributed or otherwise used for commercial purposes. All illustrations and images included in CareNotes® are the copyrighted property of [employer] or [employer]

## 2024-09-13 NOTE — ASSESSMENT & PLAN NOTE
Lab Results   Component Value Date    HGBA1C 7.0 (A) 03/01/2024     Orders:    Hemoglobin A1C; Future    Albumin / creatinine urine ratio; Future    Comprehensive metabolic panel; Future    Labs ordered  Patient will continue his current medication  His sugar when last checked was under control

## 2024-09-19 ENCOUNTER — HOSPITAL ENCOUNTER (OUTPATIENT)
Dept: NON INVASIVE DIAGNOSTICS | Age: 69
Discharge: HOME | End: 2024-09-19
Payer: MEDICARE

## 2024-09-19 VITALS
DIASTOLIC BLOOD PRESSURE: 72 MMHG | SYSTOLIC BLOOD PRESSURE: 142 MMHG | BODY MASS INDEX: 31.64 KG/M2 | HEIGHT: 70 IN | WEIGHT: 221 LBS | HEART RATE: 65 BPM

## 2024-09-19 DIAGNOSIS — I35.0 AORTIC VALVE STENOSIS, ETIOLOGY OF CARDIAC VALVE DISEASE UNSPECIFIED: ICD-10-CM

## 2024-09-19 LAB
AORTIC ROOT: 3.2 CM
AORTIC VALVE MEAN VELOCITY: 14.9 M/S
APICAL FOUR CHAMBER EJECTION FRACTION: 58 %
ASCENDING AORTA: 3.5 CM
AV AREA BY CONTINUOUS VTI: 1.6 CM2
AV AREA PEAK VELOCITY: 1.7 CM2
AV LVOT MEAN GRADIENT: 2 MMHG
AV LVOT PEAK GRADIENT: 4 MMHG
AV MEAN GRADIENT: 11 MMHG
AV PEAK GRADIENT: 18 MMHG
AV VALVE AREA: 1.54 CM2
AV VELOCITY RATIO: 0.45
BSA FOR ECHO PROCEDURE: 2.18 M2
DOP CALC AO PEAK VEL: 2.13 M/S
DOP CALC AO VTI: 53 CM
DOP CALC LVOT AREA: 3.8 CM2
DOP CALC LVOT CARDIAC INDEX: 1.99 L/MIN/M2
DOP CALC LVOT CARDIAC OUTPUT: 4.34 L/MIN
DOP CALC LVOT DIAMETER: 2.2 CM
DOP CALC LVOT PEAK VEL VTI: 21.51 CM
DOP CALC LVOT PEAK VEL: 0.95 M/S
DOP CALC LVOT STROKE INDEX: 36.7 ML/M2
DOP CALC LVOT STROKE VOLUME: 81.73 CM3
DOP CALC MV VTI: 52.49 CM
E WAVE DECELERATION TIME: 371 MS
E/A RATIO: 0.98
FRACTIONAL SHORTENING: 30 (ref 28–44)
INTERVENTRICULAR SEPTUM IN DIASTOLE (PARASTERNAL SHORT AXIS VIEW): 1 CM
INTERVENTRICULAR SEPTUM: 1 CM (ref 0.6–1.1)
LAAS-AP2: 19.9 CM2
LAAS-AP4: 22.6 CM2
LEFT ATRIUM AREA SYSTOLE SINGLE PLANE A4C: 22.6 CM2
LEFT ATRIUM SIZE: 4.9 CM
LEFT ATRIUM VOLUME (MOD BIPLANE): 67 ML
LEFT ATRIUM VOLUME INDEX (MOD BIPLANE): 30.7 ML/M2
LEFT INTERNAL DIMENSION IN SYSTOLE: 4 CM (ref 2.1–4)
LEFT VENTRICLE DIASTOLIC VOLUME (MOD BIPLANE): 93 ML
LEFT VENTRICLE DIASTOLIC VOLUME INDEX (MOD BIPLANE): 42.7 ML/M2
LEFT VENTRICLE SYSTOLIC VOLUME (MOD BIPLANE): 38 ML
LEFT VENTRICLE SYSTOLIC VOLUME INDEX (MOD BIPLANE): 17.4 ML/M2
LEFT VENTRICULAR INTERNAL DIMENSION IN DIASTOLE: 5.7 CM (ref 3.5–6)
LEFT VENTRICULAR POSTERIOR WALL IN END DIASTOLE: 1 CM
LEFT VENTRICULAR STROKE VOLUME: 89 ML
LV EF: 59 %
LVSV (TEICH): 89 ML
MV E'TISSUE VEL-SEP: 7 CM/S
MV MEAN GRADIENT: 2 MMHG
MV PEAK A VEL: 1.29 M/S
MV PEAK E VEL: 126 CM/S
MV PEAK GRADIENT: 7 MMHG
MV STENOSIS PRESSURE HALF TIME: 107 MS
MV VALVE AREA BY CONTINUITY EQUATION: 1.56 CM2
MV VALVE AREA P 1/2 METHOD: 2.06
RIGHT ATRIUM AREA SYSTOLE A4C: 21.2 CM2
RIGHT VENTRICLE ID DIMENSION: 4.3 CM
SL CV LEFT ATRIUM LENGTH A2C: 5.4 CM
SL CV LV EF: 60
SL CV PED ECHO LEFT VENTRICLE DIASTOLIC VOLUME (MOD BIPLANE) 2D: 160 ML
SL CV PED ECHO LEFT VENTRICLE SYSTOLIC VOLUME (MOD BIPLANE) 2D: 71 ML
TRICUSPID ANNULAR PLANE SYSTOLIC EXCURSION: 2 CM

## 2024-09-19 PROCEDURE — 93306 TTE W/DOPPLER COMPLETE: CPT

## 2024-09-19 PROCEDURE — 93306 TTE W/DOPPLER COMPLETE: CPT | Performed by: INTERNAL MEDICINE

## 2024-09-30 DIAGNOSIS — E11.8 TYPE 2 DIABETES MELLITUS WITH COMPLICATION, WITHOUT LONG-TERM CURRENT USE OF INSULIN (HCC): ICD-10-CM

## 2024-09-30 RX ORDER — GLIMEPIRIDE 1 MG/1
1 TABLET ORAL 2 TIMES DAILY
Qty: 180 TABLET | Refills: 1 | Status: SHIPPED | OUTPATIENT
Start: 2024-09-30

## 2024-09-30 RX ORDER — SITAGLIPTIN AND METFORMIN HYDROCHLORIDE 1000; 100 MG/1; MG/1
1 TABLET, FILM COATED, EXTENDED RELEASE ORAL
Qty: 100 TABLET | Refills: 1 | Status: SHIPPED | OUTPATIENT
Start: 2024-09-30

## 2024-10-01 ENCOUNTER — OFFICE VISIT (OUTPATIENT)
Dept: SLEEP CENTER | Facility: HOSPITAL | Age: 69
End: 2024-10-01
Payer: MEDICARE

## 2024-10-01 VITALS
SYSTOLIC BLOOD PRESSURE: 120 MMHG | DIASTOLIC BLOOD PRESSURE: 70 MMHG | HEIGHT: 70 IN | WEIGHT: 225 LBS | BODY MASS INDEX: 32.21 KG/M2

## 2024-10-01 DIAGNOSIS — I10 ESSENTIAL HYPERTENSION: ICD-10-CM

## 2024-10-01 DIAGNOSIS — E66.9 OBESITY (BMI 30-39.9): ICD-10-CM

## 2024-10-01 DIAGNOSIS — J44.0 COPD (CHRONIC OBSTRUCTIVE PULMONARY DISEASE) WITH ACUTE BRONCHITIS  (HCC): ICD-10-CM

## 2024-10-01 DIAGNOSIS — G89.4 CHRONIC PAIN DISORDER: ICD-10-CM

## 2024-10-01 DIAGNOSIS — E11.9 CONTROLLED TYPE 2 DIABETES MELLITUS WITHOUT COMPLICATION, WITHOUT LONG-TERM CURRENT USE OF INSULIN (HCC): ICD-10-CM

## 2024-10-01 DIAGNOSIS — G47.33 OBSTRUCTIVE SLEEP APNEA: Primary | ICD-10-CM

## 2024-10-01 DIAGNOSIS — J20.9 COPD (CHRONIC OBSTRUCTIVE PULMONARY DISEASE) WITH ACUTE BRONCHITIS  (HCC): ICD-10-CM

## 2024-10-01 DIAGNOSIS — R45.86 MOOD DISTURBANCE: ICD-10-CM

## 2024-10-01 PROCEDURE — 99214 OFFICE O/P EST MOD 30 MIN: CPT | Performed by: INTERNAL MEDICINE

## 2024-10-01 PROCEDURE — G2211 COMPLEX E/M VISIT ADD ON: HCPCS | Performed by: INTERNAL MEDICINE

## 2024-10-01 NOTE — PROGRESS NOTES
Follow-Up Note - [facility]   Cristino Odom  69 y.o. male  :1955  MRN:[id number]  DOS:10/1/2024    CC: I saw this patient for follow-up in clinic today for Sleep disordered breathing, Coexisting Sleep and Medical Problems.  He is using a ResMed machine.. Interval changes: None reported.      A retitration study in 2018 demonstrated sleep disordered breathing was successfully remediated with PAP at 20/14 cm H2O. Mean oxygen saturation was 91% and there were some desaturations to a sonu of 86%.      PFSH, Problem List, Medications & Allergies were reviewed in EMR.   He  has a past medical history of Allergic (), COPD (chronic obstructive pulmonary disease) ([facility]) (), Coronary artery disease, CPAP (continuous positive airway pressure) dependence (2023), Diabetes mellitus ([facility]) (), Heart disease, Hypertension (), Myocardial infarction ([facility]) (), Old myocardial infarction (2023), Sleep apnea (2023), and Tear of medial meniscus of left knee, subsequent encounter.    He has a current medication list which includes the following prescription(s): albuterol, atenolol, blood glucose monitor system, clopidogrel, ezetimibe-simvastatin, gabapentin, glimepiride, hydrocodone-acetaminophen, methocarbamol, nitroglycerin, quetiapine, sildenafil, janumet xr, velvet contour monitor, and clindamycin.    PHYSIOLOGICAL DATA REVIEW : Using PAP > 4 hours/night 100%. Estimated BENJA 0.2/hour with pressure of 25/14cm H2O@90th/95th percentile; use is limited by  .  INTERPRETATION: Compliance is excellent; Pressure setting is:optimal; ;   SUBJECTIVE: With respect to use of PAP, Cristino  is experiencing minimal adverse effects: dry mouth/throat.He derives benefit..  Feels satisfied with sleep and daytime function.   Sleep Routine: Cristino reports getting> (P) 7 hrs sleep; he has no difficulty initiating or maintaining sleep . He arises needing an alarm and (P) Always feels refreshed.Cristino (P) Rarely]denies  Pt is calm, cooperative now. 1:1 monitoring has been discontinued per order. Pt is sit in the dayroom within sight of staff.  has been notified of finger food only.  Hospital eligio has been called per pt's request. "excessive daytime sleepiness,.  He rated himself at Total score: (P) 1 /24 on the [place] Sleepiness Scale.   Other issues: Pain is controlled on gabapentin.  Stable on quetiapine..     Habits: Reports that he quit smoking about 11 years ago. His smoking use included cigarettes. He started smoking about 38 years ago. He has a 27.7 pack-year smoking history. He has been exposed to tobacco smoke. He has never used smokeless tobacco.,  Reports that he does not currently use alcohol.,  Reports that he does not currently use drugs., Caffeine use:limited until  ; Exercise routine: regular.      ROS: Significant for stable within a few pounds.  Respiratory symptoms are controlled.  Reported no cardiac symptoms.    EXAM: /70   Ht 5' 10\" (1.778 m)   Wt 102 kg (225 lb)   BMI 32.28 kg/m²     Wt Readings from Last 3 Encounters:   10/01/24 102 kg (225 lb)   09/19/24 100 kg (221 lb)   09/13/24 101 kg (221 lb 12.8 oz)      Patient is well groomed; well appearing.   CNS: Alert, orientated, speech clear & coherent  Psych: cooperative and in no distress. Mental state: Appears normal.  H&N: EOMI; NC/AT: No facial pressure marks, no rashes.    Skin/Extrem: col & hydration normal; no edema  Resp: Respiratory effort is normal  Physical findings otherwise essentially unchanged from previous.    IMPRESSION: Problem List Items & Comorbidities Addressed this Visit    1. Obstructive sleep apnea  PAP DME Resupply/Reorder      2. COPD (chronic obstructive pulmonary disease) with acute bronchitis  (HCC)        3. Essential hypertension        4. Mood disturbance        5. Chronic pain disorder        6. Obesity (BMI 30-39.9)        7. Controlled type 2 diabetes mellitus without complication, without long-term current use of insulin (HCC)            PLAN:  I reviewed results of prior studies and physiologic data with the patient.   I discussed treatment options with risks and benefits.  Treatment with  PAP is medically necessary and Cristino " "is agreable to continue use.   Care of equipment, methods to improve comfort using PAP and importance of compliance with therapy were discussed.  Pressure setting: continue 25/14 cmH2O using a fullface mask.    Rx provided to replace supplies and Care coordinated with DME provider.   Discussed strategies for weight reduction.    Follow-up is advised in 1 year or sooner if needed to monitor progress, compliance and to adjust therapy.     Thank you for allowing me to participate in the care of this patient.    Sincerely,     Authenticated electronically on 10/01/24   Board Certified Specialist     Portions of the record may have been created with voice recognition software. Occasional wrong word or \"sound a like\" substitutions may have occurred due to the inherent limitations of voice recognition software. There may also be notations and random deletions of words or characters from malfunctioning software. Read the chart carefully and recognize, using context, where substitutions/deletions have occurred.    "

## 2024-10-02 ENCOUNTER — TELEPHONE (OUTPATIENT)
Dept: SLEEP CENTER | Facility: CLINIC | Age: 69
End: 2024-10-02

## 2024-10-03 LAB

## 2024-10-08 NOTE — TELEPHONE ENCOUNTER
Jonh Mead Likely secondary to acute on chronic hypercarbic and hypoxic respiratory failure from acute exacerbation of COPD in the setting of pulmonary fibrosis and long-term tobacco abuse.  Patient was intubated after suffering cardiac arrest soon after admission 3 weeks ago and failed extubation on 9/10.  He was switched to mechanical ventilation via tracheostomy on 9/19.  Per intensivist service, plan is for continued decrease of respiratory MV support with hopeful ultimate wean.  Having difficult being weaned off ventilator due to desatting

## 2024-10-11 DIAGNOSIS — M54.50 LOW BACK PAIN WITHOUT SCIATICA, UNSPECIFIED BACK PAIN LATERALITY, UNSPECIFIED CHRONICITY: ICD-10-CM

## 2024-10-11 DIAGNOSIS — R52 PAIN: ICD-10-CM

## 2024-10-14 RX ORDER — METHOCARBAMOL 750 MG/1
750 TABLET, FILM COATED ORAL EVERY 6 HOURS PRN
Qty: 30 TABLET | Refills: 5 | Status: SHIPPED | OUTPATIENT
Start: 2024-10-14

## 2024-10-14 RX ORDER — HYDROCODONE BITARTRATE AND ACETAMINOPHEN 5; 325 MG/1; MG/1
1 TABLET ORAL EVERY 6 HOURS PRN
Qty: 30 TABLET | Refills: 0 | Status: SHIPPED | OUTPATIENT
Start: 2024-10-14

## 2024-10-18 DIAGNOSIS — E11.9 CONTROLLED TYPE 2 DIABETES MELLITUS WITHOUT COMPLICATION, WITHOUT LONG-TERM CURRENT USE OF INSULIN (HCC): ICD-10-CM

## 2024-10-18 RX ORDER — BLOOD SUGAR DIAGNOSTIC
1 STRIP MISCELLANEOUS 2 TIMES DAILY
Qty: 200 STRIP | Refills: 3 | Status: SHIPPED | OUTPATIENT
Start: 2024-10-18

## 2024-11-22 DIAGNOSIS — M54.50 LOW BACK PAIN WITHOUT SCIATICA, UNSPECIFIED BACK PAIN LATERALITY, UNSPECIFIED CHRONICITY: ICD-10-CM

## 2024-11-22 DIAGNOSIS — J20.9 COPD (CHRONIC OBSTRUCTIVE PULMONARY DISEASE) WITH ACUTE BRONCHITIS  (HCC): ICD-10-CM

## 2024-11-22 DIAGNOSIS — J44.0 COPD (CHRONIC OBSTRUCTIVE PULMONARY DISEASE) WITH ACUTE BRONCHITIS  (HCC): ICD-10-CM

## 2024-11-22 RX ORDER — ALBUTEROL SULFATE 90 UG/1
2 INHALANT RESPIRATORY (INHALATION) EVERY 6 HOURS PRN
Qty: 18 G | Refills: 5 | Status: SHIPPED | OUTPATIENT
Start: 2024-11-22

## 2024-11-22 RX ORDER — HYDROCODONE BITARTRATE AND ACETAMINOPHEN 5; 325 MG/1; MG/1
1 TABLET ORAL EVERY 6 HOURS PRN
Qty: 30 TABLET | Refills: 0 | Status: SHIPPED | OUTPATIENT
Start: 2024-11-22

## 2024-11-27 ENCOUNTER — APPOINTMENT (OUTPATIENT)
Dept: LAB | Facility: CLINIC | Age: 69
End: 2024-11-27
Payer: MEDICARE

## 2024-11-27 DIAGNOSIS — Z95.1 PRESENCE OF AORTOCORONARY BYPASS GRAFT: ICD-10-CM

## 2024-11-27 DIAGNOSIS — I25.10 ATHEROSCLEROSIS OF NATIVE CORONARY ARTERY WITHOUT ANGINA PECTORIS, UNSPECIFIED WHETHER NATIVE OR TRANSPLANTED HEART: ICD-10-CM

## 2024-11-27 DIAGNOSIS — E11.69 TYPE 2 DIABETES MELLITUS WITH OTHER SPECIFIED COMPLICATION, WITHOUT LONG-TERM CURRENT USE OF INSULIN (HCC): ICD-10-CM

## 2024-11-27 DIAGNOSIS — E78.5 HYPERLIPIDEMIA, UNSPECIFIED HYPERLIPIDEMIA TYPE: ICD-10-CM

## 2024-11-27 DIAGNOSIS — E11.9 CONTROLLED TYPE 2 DIABETES MELLITUS WITHOUT COMPLICATION, WITHOUT LONG-TERM CURRENT USE OF INSULIN (HCC): ICD-10-CM

## 2024-11-27 DIAGNOSIS — R91.8 OTHER NONSPECIFIC ABNORMAL FINDING OF LUNG FIELD: ICD-10-CM

## 2024-11-27 DIAGNOSIS — I11.9 HYPERTENSIVE HEART DISEASE WITHOUT HEART FAILURE: ICD-10-CM

## 2024-11-27 DIAGNOSIS — Z12.5 SCREENING FOR PROSTATE CANCER: ICD-10-CM

## 2024-11-27 DIAGNOSIS — G47.33 OBSTRUCTIVE SLEEP APNEA (ADULT) (PEDIATRIC): ICD-10-CM

## 2024-11-27 DIAGNOSIS — Z79.899 HIGH RISK MEDICATION USE: ICD-10-CM

## 2024-11-27 LAB
ALBUMIN SERPL BCG-MCNC: 4 G/DL (ref 3.5–5)
ALP SERPL-CCNC: 49 U/L (ref 34–104)
ALT SERPL W P-5'-P-CCNC: 20 U/L (ref 7–52)
ANION GAP SERPL CALCULATED.3IONS-SCNC: 5 MMOL/L (ref 4–13)
AST SERPL W P-5'-P-CCNC: 16 U/L (ref 13–39)
BASOPHILS # BLD AUTO: 0.11 THOUSANDS/ΜL (ref 0–0.1)
BASOPHILS NFR BLD AUTO: 1 % (ref 0–1)
BILIRUB DIRECT SERPL-MCNC: 0.09 MG/DL (ref 0–0.2)
BILIRUB SERPL-MCNC: 0.44 MG/DL (ref 0.2–1)
BILIRUB UR QL STRIP: NEGATIVE
BUN SERPL-MCNC: 15 MG/DL (ref 5–25)
CALCIUM SERPL-MCNC: 10 MG/DL (ref 8.4–10.2)
CHLORIDE SERPL-SCNC: 102 MMOL/L (ref 96–108)
CHOLEST SERPL-MCNC: 113 MG/DL (ref ?–200)
CK SERPL-CCNC: 57 U/L (ref 39–308)
CLARITY UR: CLEAR
CO2 SERPL-SCNC: 29 MMOL/L (ref 21–32)
COLOR UR: COLORLESS
CREAT SERPL-MCNC: 0.9 MG/DL (ref 0.6–1.3)
CREAT UR-MCNC: 37.6 MG/DL
CRP SERPL HS-MCNC: 5.36 MG/L
EOSINOPHIL # BLD AUTO: 0.52 THOUSAND/ΜL (ref 0–0.61)
EOSINOPHIL NFR BLD AUTO: 5 % (ref 0–6)
ERYTHROCYTE [DISTWIDTH] IN BLOOD BY AUTOMATED COUNT: 12.8 % (ref 11.6–15.1)
EST. AVERAGE GLUCOSE BLD GHB EST-MCNC: 200 MG/DL
GFR SERPL CREATININE-BSD FRML MDRD: 86 ML/MIN/1.73SQ M
GLUCOSE P FAST SERPL-MCNC: 154 MG/DL (ref 65–99)
GLUCOSE UR STRIP-MCNC: NEGATIVE MG/DL
HBA1C MFR BLD: 8.6 %
HCT VFR BLD AUTO: 43.2 % (ref 36.5–49.3)
HDLC SERPL-MCNC: 42 MG/DL
HGB BLD-MCNC: 14 G/DL (ref 12–17)
HGB UR QL STRIP.AUTO: NEGATIVE
IMM GRANULOCYTES # BLD AUTO: 0.12 THOUSAND/UL (ref 0–0.2)
IMM GRANULOCYTES NFR BLD AUTO: 1 % (ref 0–2)
KETONES UR STRIP-MCNC: NEGATIVE MG/DL
LDLC SERPL CALC-MCNC: 44 MG/DL (ref 0–100)
LEUKOCYTE ESTERASE UR QL STRIP: NEGATIVE
LYMPHOCYTES # BLD AUTO: 2.34 THOUSANDS/ΜL (ref 0.6–4.47)
LYMPHOCYTES NFR BLD AUTO: 21 % (ref 14–44)
MAGNESIUM SERPL-MCNC: 2.2 MG/DL (ref 1.9–2.7)
MCH RBC QN AUTO: 31.7 PG (ref 26.8–34.3)
MCHC RBC AUTO-ENTMCNC: 32.4 G/DL (ref 31.4–37.4)
MCV RBC AUTO: 98 FL (ref 82–98)
MICROALBUMIN UR-MCNC: <7 MG/L
MONOCYTES # BLD AUTO: 0.59 THOUSAND/ΜL (ref 0.17–1.22)
MONOCYTES NFR BLD AUTO: 5 % (ref 4–12)
NEUTROPHILS # BLD AUTO: 7.26 THOUSANDS/ΜL (ref 1.85–7.62)
NEUTS SEG NFR BLD AUTO: 67 % (ref 43–75)
NITRITE UR QL STRIP: NEGATIVE
NRBC BLD AUTO-RTO: 0 /100 WBCS
PH UR STRIP.AUTO: 6 [PH]
PLATELET # BLD AUTO: 165 THOUSANDS/UL (ref 149–390)
PMV BLD AUTO: 11 FL (ref 8.9–12.7)
POTASSIUM SERPL-SCNC: 5.2 MMOL/L (ref 3.5–5.3)
PROT SERPL-MCNC: 7 G/DL (ref 6.4–8.4)
PROT UR STRIP-MCNC: NEGATIVE MG/DL
PSA SERPL-MCNC: 0.54 NG/ML (ref 0–4)
RBC # BLD AUTO: 4.41 MILLION/UL (ref 3.88–5.62)
SODIUM SERPL-SCNC: 136 MMOL/L (ref 135–147)
SP GR UR STRIP.AUTO: 1.01 (ref 1–1.03)
TRIGL SERPL-MCNC: 135 MG/DL (ref ?–150)
TSH SERPL DL<=0.05 MIU/L-ACNC: 2.13 UIU/ML (ref 0.45–4.5)
UROBILINOGEN UR STRIP-ACNC: <2 MG/DL
WBC # BLD AUTO: 10.94 THOUSAND/UL (ref 4.31–10.16)

## 2024-11-27 PROCEDURE — 82248 BILIRUBIN DIRECT: CPT

## 2024-11-27 PROCEDURE — 86141 C-REACTIVE PROTEIN HS: CPT

## 2024-11-27 PROCEDURE — 83735 ASSAY OF MAGNESIUM: CPT

## 2024-11-27 PROCEDURE — 80061 LIPID PANEL: CPT

## 2024-11-27 PROCEDURE — 84443 ASSAY THYROID STIM HORMONE: CPT

## 2024-11-27 PROCEDURE — 82550 ASSAY OF CK (CPK): CPT

## 2024-11-27 PROCEDURE — G0103 PSA SCREENING: HCPCS

## 2024-11-27 PROCEDURE — 36415 COLL VENOUS BLD VENIPUNCTURE: CPT

## 2024-11-27 PROCEDURE — 83036 HEMOGLOBIN GLYCOSYLATED A1C: CPT

## 2024-11-27 PROCEDURE — 81003 URINALYSIS AUTO W/O SCOPE: CPT

## 2024-11-27 PROCEDURE — 80053 COMPREHEN METABOLIC PANEL: CPT

## 2024-11-27 PROCEDURE — 82570 ASSAY OF URINE CREATININE: CPT

## 2024-11-27 PROCEDURE — 85025 COMPLETE CBC W/AUTO DIFF WBC: CPT

## 2024-11-27 PROCEDURE — 82043 UR ALBUMIN QUANTITATIVE: CPT

## 2024-12-12 DIAGNOSIS — M54.50 LOW BACK PAIN WITHOUT SCIATICA, UNSPECIFIED BACK PAIN LATERALITY, UNSPECIFIED CHRONICITY: ICD-10-CM

## 2024-12-12 DIAGNOSIS — R52 PAIN: ICD-10-CM

## 2024-12-12 RX ORDER — METHOCARBAMOL 750 MG/1
750 TABLET, FILM COATED ORAL EVERY 6 HOURS PRN
Qty: 30 TABLET | Refills: 0 | Status: SHIPPED | OUTPATIENT
Start: 2024-12-12

## 2024-12-12 RX ORDER — HYDROCODONE BITARTRATE AND ACETAMINOPHEN 5; 325 MG/1; MG/1
1 TABLET ORAL EVERY 6 HOURS PRN
Qty: 30 TABLET | Refills: 0 | Status: SHIPPED | OUTPATIENT
Start: 2024-12-12

## 2024-12-20 DIAGNOSIS — E11.8 TYPE 2 DIABETES MELLITUS WITH COMPLICATION, WITHOUT LONG-TERM CURRENT USE OF INSULIN (HCC): ICD-10-CM

## 2024-12-20 RX ORDER — GLIMEPIRIDE 1 MG/1
1 TABLET ORAL 2 TIMES DAILY
Qty: 180 TABLET | Refills: 1 | Status: SHIPPED | OUTPATIENT
Start: 2024-12-20

## 2024-12-23 ENCOUNTER — RESULTS FOLLOW-UP (OUTPATIENT)
Dept: FAMILY MEDICINE CLINIC | Facility: CLINIC | Age: 69
End: 2024-12-23

## 2024-12-30 ENCOUNTER — OFFICE VISIT (OUTPATIENT)
Dept: PAIN MEDICINE | Facility: CLINIC | Age: 69
End: 2024-12-30
Payer: MEDICARE

## 2024-12-30 VITALS — HEART RATE: 60 BPM | WEIGHT: 231.38 LBS | TEMPERATURE: 97.7 F | BODY MASS INDEX: 33.2 KG/M2

## 2024-12-30 DIAGNOSIS — M51.26 LUMBAR DISC HERNIATION: Primary | ICD-10-CM

## 2024-12-30 DIAGNOSIS — M54.16 LUMBAR RADICULOPATHY: ICD-10-CM

## 2024-12-30 DIAGNOSIS — G89.4 CHRONIC PAIN SYNDROME: ICD-10-CM

## 2024-12-30 DIAGNOSIS — M16.12 LOCALIZED OSTEOARTHROSIS OF LEFT HIP: ICD-10-CM

## 2024-12-30 DIAGNOSIS — M48.061 LUMBAR FORAMINAL STENOSIS: ICD-10-CM

## 2024-12-30 PROCEDURE — 99214 OFFICE O/P EST MOD 30 MIN: CPT | Performed by: NURSE PRACTITIONER

## 2024-12-30 PROCEDURE — G2211 COMPLEX E/M VISIT ADD ON: HCPCS | Performed by: NURSE PRACTITIONER

## 2024-12-30 RX ORDER — GABAPENTIN 400 MG/1
CAPSULE ORAL
Qty: 360 CAPSULE | Refills: 1 | Status: SHIPPED | OUTPATIENT
Start: 2024-12-30

## 2024-12-30 NOTE — PROGRESS NOTES
Assessment:  1. Lumbar disc herniation    2. Lumbar radiculopathy    3. Localized osteoarthrosis of left hip    4. Chronic pain syndrome    5. Lumbar foraminal stenosis        Plan:  The patient is a 69 y.o. male last seen on 07/15/2024 who presents for a follow up office visit in regards to chronic pain secondary to low back pain, lumbar stenosis, lumbar herniation with radiculopathy and left hip osteoarthritis. Patient presents today with ongoing left leg pain.  The pain has been stable since the last office visit.  He takes gabapentin 400 mg 1 tab in the morning, 1 tablet in the afternoon and 2 tablets at night which is providing 60-70% pain relief. Therefore, I will continue him on the medication as prescribed. Refills for gabapentin were electronically sent to the pharmacy.     The patient will follow-up in 6 months for medication prescription refill and reevaluation. The patient was advised to contact the office should their symptoms worsen in the interim. The patient was agreeable and verbalized an understanding.        History of Present Illness:    The patient is a 69 y.o. male last seen on 07/15/2024 who presents for a follow up office visit in regards to chronic pain secondary to low back pain, lumbar stenosis, lumbar herniation with radiculopathy and left hip osteoarthritis.  His last office visit was July 15, 2024, in which she was continued on gabapentin 400 mg.  Patient presents today with ongoing pain that is located in the left leg.  He feels his pain remains unchanged since the last office visit.  The pain occurs on occasion and is described as dull aching.  He does feel the pain affects his activities of daily living.  He is currently rating his pain a 0/10 on the numeric rating scale.    He is taking gabapentin 400 mg 1 tab in the morning, 1 tablet in the afternoon and 2 tablets at night which is providing 60-70% pain relief.  He also does use recreational marijuana. his Primary care physician  prescribes him Norco 5/325 mg 1 tab every 6 hours and methocarbamol 750 mg 1 tablet every 6 hours     The patient had underwent a left L5 and S1 transforaminal epidural steroid injection in  (15% relief) and a left hip injection in  (30-40% relief for 2 weeks)     Patient diabetic. Hemoglobin A1C=8.6 on 24    I have personally reviewed and/or updated the patient's past medical history, past surgical history, family history, social history, current medications, allergies, and vital signs today.       Review of Systems:    Review of Systems   Musculoskeletal:  Positive for gait problem.         Past Medical History:   Diagnosis Date    Allergic     COPD (chronic obstructive pulmonary disease) ([facility])     Coronary artery disease     CPAP (continuous positive airway pressure) dependence 2023    Diabetes mellitus ([facility])     Heart disease     Hypertension     Myocardial infarction ([facility])     Old myocardial infarction 2023    Sleep apnea 2023    Tear of medial meniscus of left knee, subsequent encounter     RESOLVE: 2015       Past Surgical History:   Procedure Laterality Date    APPENDECTOMY      COLONOSCOPY      CORONARY ANGIOPLASTY      CORONARY ARTERY BYPASS GRAFT      KNEE ARTHROSCOPY      KNEE SURGERY Right     TONSILLECTOMY AND ADENOIDECTOMY      UMBILICAL HERNIA REPAIR         Family History   Adopted: Yes       Social History     Occupational History    Occupation: WORKING FULL TIME    Tobacco Use    Smoking status: Former     Current packs/day: 0.00     Average packs/day: 1 pack/day for 27.7 years (27.7 ttl pk-yrs)     Types: Cigarettes     Start date: 1986     Quit date: 10/2/2013     Years since quittin.2     Passive exposure: Current    Smokeless tobacco: Never    Tobacco comments:     FORMER SMOKER AS PER ALL SCRIPTS    Vaping Use    Vaping status: Former   Substance and Sexual Activity    Alcohol use: Not Currently     Comment: rare    Drug use:  Yes     Types: Marijuana    Sexual activity: Yes     Partners: Female     Birth control/protection: None         Current Outpatient Medications:     albuterol (PROVENTIL HFA,VENTOLIN HFA) 90 mcg/act inhaler, Inhale 2 puffs every 6 (six) hours as needed for wheezing, Disp: 18 g, Rfl: 5    atenolol (TENORMIN) 25 mg tablet, Take 1 tablet by mouth daily Half tablet daily 12.5mg, Disp: , Rfl: 1    Blood Glucose Monitoring Suppl MIGUEL ANGEL, Use 2 (two) times a day, Disp: 1 each, Rfl: 0    clopidogrel (PLAVIX) 75 mg tablet, Take 1 tablet (75 mg total) by mouth daily, Disp: 90 tablet, Rfl: 1    ezetimibe-simvastatin (VYTORIN) 10-40 mg per tablet, Take 1 tablet by mouth daily, Disp: , Rfl:     gabapentin (NEURONTIN) 400 mg capsule, Take 1 tab in the a.m., 1 in the afternoon, and 2 tabs at night, Disp: 360 capsule, Rfl: 1    glimepiride (AMARYL) 1 mg tablet, Take 1 tablet (1 mg total) by mouth 2 (two) times a day, Disp: 180 tablet, Rfl: 1    Glucose Blood (Blood Glucose Test Strips 333) STRP, Use 1 each 2 (two) times a day, Disp: 200 strip, Rfl: 3    HYDROcodone-acetaminophen (NORCO) 5-325 mg per tablet, Take 1 tablet by mouth every 6 (six) hours as needed for pain Max Daily Amount: 4 tablets, Disp: 30 tablet, Rfl: 0    methocarbamol (ROBAXIN) 750 mg tablet, Take 1 tablet (750 mg total) by mouth every 6 (six) hours as needed for muscle spasms, Disp: 30 tablet, Rfl: 0    QUEtiapine (SEROquel) 100 mg tablet, Take 1 tablet by mouth daily, Disp: , Rfl:     sildenafil (VIAGRA) 100 mg tablet, as needed , Disp: , Rfl: 1    SITagliptin-metFORMIN HCl ER (Janumet XR) 100-1000 MG TB24, Take 1 tablet by mouth daily with dinner, Disp: 100 tablet, Rfl: 1    clindamycin (CLEOCIN) 150 mg capsule, Take 150 mg by mouth every 6 (six) hours (Patient not taking: Reported on 3/1/2024), Disp: , Rfl:     nitroglycerin (NITROLINGUAL) 0.4 mg/spray spray, As needed (Patient not taking: Reported on 12/30/2024), Disp: , Rfl:     Allergies   Allergen Reactions     Penicillins Other (See Comments)     hives-emily cefazolin       Physical Exam:    Pulse 60   Temp 97.7 °F (36.5 °C)   Wt 105 kg (231 lb 6 oz)   BMI 33.20 kg/m²     Constitutional:normal, well developed, well nourished, alert, in no distress and non-toxic and no overt pain behavior.  Eyes:anicteric  HEENT:grossly intact  Neck:supple, symmetric, trachea midline and no masses   Pulmonary:even and unlabored  Cardiovascular:No edema or pitting edema present  Skin:Normal without rashes or lesions and well hydrated  Psychiatric:Mood and affect appropriate  Neurologic:Cranial Nerves II-XII grossly intact  Musculoskeletal:normal      Imaging    CT LUMBAR SPINE WITHOUT CONTRAST 4/1/24     INDICATION:   trauma.     COMPARISON: CT lumbar spine 1/13/2021     TECHNIQUE:  Contiguous axial images through the lumbar spine were obtained. Sagittal and coronal reconstructions were performed.     IV Contrast: None.     Radiation dose length product (DLP) for this visit:  1625.17 mGy-cm .  This examination, like all CT scans performed in the [facility], was performed utilizing techniques to minimize radiation dose exposure, including the use of   iterative reconstruction and automated exposure control.     IMAGE QUALITY:  Diagnostic.     FINDINGS:     ALIGNMENT:  There are 5 lumbar type vertebral bodies.  Normal alignment of the lumbar spine.  No spondylolysis or spondylolisthesis.     VERTEBRAE: No acute fracture. Stable mild chronic compression deformities of T11-L1.     DEGENERATIVE CHANGES:     Lower Thoracic spine: Stable mild multilevel degenerative changes. No critical canal stenosis.     Lumbar spine: Stable severe disc height loss at L5-S1 and moderate disc height loss at L1-2. Stable mild to moderate multilevel facet degenerative changes most prominent at L4-5 and L5-S1. No significant canal stenosis. Stable moderate bilateral   neuroforaminal narrowing at L5-S1, left greater than right.     PARASPINAL  SOFT TISSUES: No paraspinal hematoma.     Stable right adrenal low-density nodule attributed to an adenoma. Aortoiliac atherosclerotic disease.     IMPRESSION:     No acute osseous abnormality or malalignment.  No orders to display         No orders of the defined types were placed in this encounter.

## 2025-01-02 DIAGNOSIS — E11.8 TYPE 2 DIABETES MELLITUS WITH COMPLICATION, WITHOUT LONG-TERM CURRENT USE OF INSULIN (HCC): ICD-10-CM

## 2025-01-02 RX ORDER — GLIMEPIRIDE 2 MG/1
2 TABLET ORAL 2 TIMES DAILY
Qty: 180 TABLET | Refills: 1 | Status: SHIPPED | OUTPATIENT
Start: 2025-01-02

## 2025-01-02 RX ORDER — SITAGLIPTIN AND METFORMIN HYDROCHLORIDE 1000; 100 MG/1; MG/1
1 TABLET, FILM COATED, EXTENDED RELEASE ORAL
Qty: 100 TABLET | Refills: 1 | Status: SHIPPED | OUTPATIENT
Start: 2025-01-02 | End: 2025-01-02

## 2025-01-02 NOTE — TELEPHONE ENCOUNTER
Is this the same medication he usually takes?  He said all the local pharmacys are out of the medication and he was wondering if you can send something different in. He is concerned because he is out of his med. Sorry I attached a note with the med, maybe you didn't see it. If this is a different medication please let me.

## 2025-01-02 NOTE — TELEPHONE ENCOUNTER
[facility] in [facility]. They keep telling him it will be in and then it isnt in. Patient wants to know if there is a different medication that can be [facility]. He is completley out and is worried about his sugars going up.

## 2025-01-10 DIAGNOSIS — R52 PAIN: ICD-10-CM

## 2025-01-10 DIAGNOSIS — M54.50 LOW BACK PAIN WITHOUT SCIATICA, UNSPECIFIED BACK PAIN LATERALITY, UNSPECIFIED CHRONICITY: ICD-10-CM

## 2025-01-10 RX ORDER — HYDROCODONE BITARTRATE AND ACETAMINOPHEN 5; 325 MG/1; MG/1
1 TABLET ORAL EVERY 6 HOURS PRN
Qty: 30 TABLET | Refills: 0 | Status: SHIPPED | OUTPATIENT
Start: 2025-01-10

## 2025-01-10 RX ORDER — METHOCARBAMOL 750 MG/1
750 TABLET, FILM COATED ORAL EVERY 6 HOURS PRN
Qty: 30 TABLET | Refills: 0 | Status: SHIPPED | OUTPATIENT
Start: 2025-01-10

## 2025-01-13 ENCOUNTER — TELEPHONE (OUTPATIENT)
Age: 70
End: 2025-01-13

## 2025-01-13 NOTE — TELEPHONE ENCOUNTER
PA for methocarbamol (ROBAXIN) 750 mg tablet SUBMITTED to [facility]    via    []CMM-KEY:  [x][facility]-Case ID # [id number]   []Availity-Auth ID # NDC #   []Faxed to plan   []Other website   []Phone call Case ID #     []PA sent as URGENT    All office notes, labs and other pertaining documents and studies sent. Clinical questions answered. Awaiting determination from insurance company.     Turnaround time for your insurance to make a decision on your Prior Authorization can take 7-21 business days.

## 2025-01-14 NOTE — TELEPHONE ENCOUNTER
PA for methocarbamol (ROBAXIN) 750 mg tablet APPROVED     Date(s) approved 01/01/25-01/13/26    Case # [id number]    Patient advised by          []MyChart Message  [x]Phone call - patient already picked medication up  []LMOM  []L/M to call office as no active Communication consent on file  []Unable to leave detailed message as VM not approved on Communication consent       Pharmacy advised by    [x]Fax  []Phone call    Approval letter scanned into Media No , not available at time of approval

## 2025-02-03 DIAGNOSIS — R52 PAIN: ICD-10-CM

## 2025-02-03 DIAGNOSIS — M54.50 LOW BACK PAIN WITHOUT SCIATICA, UNSPECIFIED BACK PAIN LATERALITY, UNSPECIFIED CHRONICITY: ICD-10-CM

## 2025-02-03 RX ORDER — HYDROCODONE BITARTRATE AND ACETAMINOPHEN 5; 325 MG/1; MG/1
1 TABLET ORAL EVERY 6 HOURS PRN
Qty: 30 TABLET | Refills: 0 | Status: SHIPPED | OUTPATIENT
Start: 2025-02-03

## 2025-02-03 RX ORDER — METHOCARBAMOL 750 MG/1
750 TABLET, FILM COATED ORAL EVERY 6 HOURS PRN
Qty: 30 TABLET | Refills: 3 | Status: SHIPPED | OUTPATIENT
Start: 2025-02-03

## 2025-02-28 DIAGNOSIS — R52 PAIN: ICD-10-CM

## 2025-02-28 DIAGNOSIS — M54.50 LOW BACK PAIN WITHOUT SCIATICA, UNSPECIFIED BACK PAIN LATERALITY, UNSPECIFIED CHRONICITY: ICD-10-CM

## 2025-02-28 RX ORDER — HYDROCODONE BITARTRATE AND ACETAMINOPHEN 5; 325 MG/1; MG/1
1 TABLET ORAL EVERY 6 HOURS PRN
Qty: 30 TABLET | Refills: 0 | Status: SHIPPED | OUTPATIENT
Start: 2025-02-28

## 2025-02-28 RX ORDER — METHOCARBAMOL 750 MG/1
750 TABLET, FILM COATED ORAL EVERY 6 HOURS PRN
Qty: 30 TABLET | Refills: 0 | Status: SHIPPED | OUTPATIENT
Start: 2025-02-28

## 2025-03-07 ENCOUNTER — RA CDI HCC (OUTPATIENT)
Dept: OTHER | Facility: HOSPITAL | Age: 70
End: 2025-03-07

## 2025-03-14 ENCOUNTER — OFFICE VISIT (OUTPATIENT)
Dept: FAMILY MEDICINE CLINIC | Facility: CLINIC | Age: 70
End: 2025-03-14
Payer: MEDICARE

## 2025-03-14 VITALS
BODY MASS INDEX: 31.92 KG/M2 | RESPIRATION RATE: 17 BRPM | WEIGHT: 223 LBS | OXYGEN SATURATION: 97 % | SYSTOLIC BLOOD PRESSURE: 140 MMHG | DIASTOLIC BLOOD PRESSURE: 74 MMHG | HEART RATE: 60 BPM | HEIGHT: 70 IN | TEMPERATURE: 97.7 F

## 2025-03-14 DIAGNOSIS — G89.4 CHRONIC PAIN SYNDROME: ICD-10-CM

## 2025-03-14 DIAGNOSIS — J44.0 COPD (CHRONIC OBSTRUCTIVE PULMONARY DISEASE) WITH ACUTE BRONCHITIS  (HCC): ICD-10-CM

## 2025-03-14 DIAGNOSIS — M51.27 HERNIATED NUCLEUS PULPOSUS, L5-S1: ICD-10-CM

## 2025-03-14 DIAGNOSIS — E11.9 CONTROLLED TYPE 2 DIABETES MELLITUS WITHOUT COMPLICATION, WITHOUT LONG-TERM CURRENT USE OF INSULIN (HCC): ICD-10-CM

## 2025-03-14 DIAGNOSIS — J20.9 COPD (CHRONIC OBSTRUCTIVE PULMONARY DISEASE) WITH ACUTE BRONCHITIS  (HCC): ICD-10-CM

## 2025-03-14 DIAGNOSIS — E11.8 TYPE 2 DIABETES MELLITUS WITH COMPLICATION, WITHOUT LONG-TERM CURRENT USE OF INSULIN (HCC): Primary | ICD-10-CM

## 2025-03-14 LAB — SL AMB POCT HEMOGLOBIN AIC: 7.5 (ref ?–6.5)

## 2025-03-14 PROCEDURE — 83036 HEMOGLOBIN GLYCOSYLATED A1C: CPT | Performed by: FAMILY MEDICINE

## 2025-03-14 PROCEDURE — 99214 OFFICE O/P EST MOD 30 MIN: CPT | Performed by: FAMILY MEDICINE

## 2025-03-14 PROCEDURE — G2211 COMPLEX E/M VISIT ADD ON: HCPCS | Performed by: FAMILY MEDICINE

## 2025-03-14 RX ORDER — BLOOD SUGAR DIAGNOSTIC
1 STRIP MISCELLANEOUS 2 TIMES DAILY
Qty: 200 STRIP | Refills: 3 | Status: SHIPPED | OUTPATIENT
Start: 2025-03-14

## 2025-03-14 NOTE — PROGRESS NOTES
Name: Cristino Odom      : 1955      MRN: [id number]  Encounter Provider: Leo Bliss DO  Encounter Date: 3/14/2025   Encounter department: [facility]  :  Assessment & Plan  Type 2 diabetes mellitus with complication, without long-term current use of insulin (HCC)    Lab Results   Component Value Date    HGBA1C 7.5 (A) 2025   Sugars are much better  Continue current regimen and continue watching diet    Orders:    POCT hemoglobin A1c    Lancets    COPD (chronic obstructive pulmonary disease) with acute bronchitis  (HCC)  This is stable  Patient will continue inhaler as needed       Controlled type 2 diabetes mellitus without complication, without long-term current use of insulin (HCC)    Lab Results   Component Value Date    HGBA1C 7.5 (A) 2025       Orders:    Glucose Blood (Blood Glucose Test Strips 333) STRP; Use 1 each 2 (two) times a day    Chronic pain syndrome  This is stable  Meds are stable       Herniated nucleus pulposus, L5-S1  This is stable  Patient follows with pain management             Depression Screening and Follow-up Plan: Patient was screened for depression during today's encounter. They screened negative with a PHQ-2 score of 0.        History of Present Illness   Cristino is a 69 year-old male presenting to the office today for a 6 month follow-up. Overall, Cristino has no complaints/acute concerns this visit. He reports mild shortness of breath with activity and only uses his inhaler a handful of times per month. Denies headaches, dizziness, cheat pain, palpitations, orthopnea, lower extremity edema, nausea, vomiting, diarrhea, constipation, or auditory/visual disturbance. He Reports getting his eyes checked at [facility] and received a diabetic foot exam in office today. He remains medication compliant and requested refills for lancets and glucose test strips. He is up to date on all screenings. Follows with cardiology regularly last echo 24.  "      Review of Systems   Constitutional: Negative.    HENT: Negative.     Eyes: Negative.    Respiratory: Negative.     Cardiovascular: Negative.    Gastrointestinal: Negative.    Endocrine: Negative.    Genitourinary: Negative.    Musculoskeletal: Negative.    Skin: Negative.    Allergic/Immunologic: Negative.    Neurological: Negative.    Hematological: Negative.    Psychiatric/Behavioral: Negative.     All other systems reviewed and are negative.      Objective   /74 (BP Location: Right arm, Patient Position: Sitting, Cuff Size: Large)   Pulse 60   Temp 97.7 °F (36.5 °C) (Tympanic)   Resp 17   Ht 5' 10\" (1.778 m)   Wt 101 kg (223 lb)   SpO2 97%   BMI 32.00 kg/m²      Physical Exam  Vitals and nursing note reviewed.   Constitutional:       Appearance: Normal appearance. He is well-developed.   HENT:      Head: Normocephalic.      Right Ear: External ear normal.      Left Ear: External ear normal.      Nose: Nose normal.   Eyes:      Conjunctiva/sclera: Conjunctivae normal.      Pupils: Pupils are equal, round, and reactive to light.   Cardiovascular:      Rate and Rhythm: Normal rate and regular rhythm.      Pulses: no weak pulses.           Dorsalis pedis pulses are 2+ on the right side and 2+ on the left side.        Posterior tibial pulses are 2+ on the right side and 2+ on the left side.      Heart sounds: Murmur heard.   Pulmonary:      Effort: Pulmonary effort is normal.      Breath sounds: Wheezing present.   Abdominal:      General: Bowel sounds are normal.      Palpations: Abdomen is soft.   Musculoskeletal:         General: Normal range of motion.      Cervical back: Normal range of motion and neck supple.   Feet:      Right foot:      Skin integrity: Dry skin present. No ulcer, skin breakdown, erythema, warmth or callus.      Left foot:      Skin integrity: Dry skin present. No ulcer, skin breakdown, erythema, warmth or callus.   Skin:     General: Skin is warm and dry.   Neurological:     "  General: No focal deficit present.      Mental Status: He is alert and oriented to person, place, and time.   Psychiatric:         Behavior: Behavior normal.         Thought Content: Thought content normal.         Judgment: Judgment normal.           Diabetic Foot Exam    Patient's shoes and socks removed.    Right Foot/Ankle   Right Foot Inspection  Skin Exam: skin normal and dry skin. Skin not intact, no warmth, no callus, no erythema, no maceration, no abnormal color, no pre-ulcer, no ulcer and no callus.     Toe Exam: ROM and strength within normal limits.     Sensory   Vibration: intact  Proprioception: intact  Monofilament testing: intact    Vascular  Capillary refills: < 3 seconds  The right DP pulse is 2+. The right PT pulse is 2+.     Left Foot/Ankle  Left Foot Inspection  Skin Exam: skin normal and dry skin. Skin not intact, no warmth, no erythema, no maceration, normal color, no pre-ulcer, no ulcer and no callus.     Toe Exam: ROM and strength within normal limits.     Sensory   Vibration: intact  Proprioception: intact  Monofilament testing: intact    Vascular  Capillary refills: < 3 seconds  The left DP pulse is 2+. The left PT pulse is 2+.     Assign Risk Category  No deformity present  No loss of protective sensation  No weak pulses  Risk: 0

## 2025-03-14 NOTE — ASSESSMENT & PLAN NOTE
Lab Results   Component Value Date    HGBA1C 7.5 (A) 03/14/2025       Orders:    Glucose Blood (Blood Glucose Test Strips 333) STRP; Use 1 each 2 (two) times a day

## 2025-03-14 NOTE — ASSESSMENT & PLAN NOTE
Lab Results   Component Value Date    HGBA1C 7.5 (A) 03/14/2025   Sugars are much better  Continue current regimen and continue watching diet    Orders:    POCT hemoglobin A1c    Lancets

## 2025-03-26 DIAGNOSIS — R52 PAIN: ICD-10-CM

## 2025-03-26 DIAGNOSIS — M54.50 LOW BACK PAIN WITHOUT SCIATICA, UNSPECIFIED BACK PAIN LATERALITY, UNSPECIFIED CHRONICITY: ICD-10-CM

## 2025-03-26 RX ORDER — HYDROCODONE BITARTRATE AND ACETAMINOPHEN 5; 325 MG/1; MG/1
1 TABLET ORAL EVERY 6 HOURS PRN
Qty: 30 TABLET | Refills: 0 | Status: SHIPPED | OUTPATIENT
Start: 2025-03-26

## 2025-03-26 RX ORDER — METHOCARBAMOL 750 MG/1
750 TABLET, FILM COATED ORAL EVERY 6 HOURS PRN
Qty: 30 TABLET | Refills: 1 | Status: SHIPPED | OUTPATIENT
Start: 2025-03-26

## 2025-04-25 DIAGNOSIS — M54.50 LOW BACK PAIN WITHOUT SCIATICA, UNSPECIFIED BACK PAIN LATERALITY, UNSPECIFIED CHRONICITY: ICD-10-CM

## 2025-04-25 DIAGNOSIS — R52 PAIN: ICD-10-CM

## 2025-04-25 RX ORDER — METHOCARBAMOL 750 MG/1
750 TABLET, FILM COATED ORAL EVERY 6 HOURS PRN
Qty: 30 TABLET | Refills: 0 | Status: SHIPPED | OUTPATIENT
Start: 2025-04-25

## 2025-04-26 RX ORDER — HYDROCODONE BITARTRATE AND ACETAMINOPHEN 5; 325 MG/1; MG/1
1 TABLET ORAL EVERY 6 HOURS PRN
Qty: 30 TABLET | Refills: 0 | Status: SHIPPED | OUTPATIENT
Start: 2025-04-26

## 2025-04-28 DIAGNOSIS — J20.9 COPD (CHRONIC OBSTRUCTIVE PULMONARY DISEASE) WITH ACUTE BRONCHITIS  (HCC): ICD-10-CM

## 2025-04-28 DIAGNOSIS — J44.0 COPD (CHRONIC OBSTRUCTIVE PULMONARY DISEASE) WITH ACUTE BRONCHITIS  (HCC): ICD-10-CM

## 2025-04-29 RX ORDER — ALBUTEROL SULFATE 90 UG/1
2 INHALANT RESPIRATORY (INHALATION) EVERY 6 HOURS PRN
Qty: 18 G | Refills: 1 | Status: SHIPPED | OUTPATIENT
Start: 2025-04-29

## 2025-05-22 DIAGNOSIS — R52 PAIN: ICD-10-CM

## 2025-05-22 DIAGNOSIS — M54.50 LOW BACK PAIN WITHOUT SCIATICA, UNSPECIFIED BACK PAIN LATERALITY, UNSPECIFIED CHRONICITY: ICD-10-CM

## 2025-05-22 RX ORDER — METHOCARBAMOL 750 MG/1
750 TABLET, FILM COATED ORAL EVERY 6 HOURS PRN
Qty: 30 TABLET | Refills: 3 | Status: SHIPPED | OUTPATIENT
Start: 2025-05-22

## 2025-05-22 RX ORDER — HYDROCODONE BITARTRATE AND ACETAMINOPHEN 5; 325 MG/1; MG/1
1 TABLET ORAL EVERY 6 HOURS PRN
Qty: 30 TABLET | Refills: 0 | Status: SHIPPED | OUTPATIENT
Start: 2025-05-22

## 2025-06-16 ENCOUNTER — OFFICE VISIT (OUTPATIENT)
Dept: PAIN MEDICINE | Facility: CLINIC | Age: 70
End: 2025-06-16
Payer: MEDICARE

## 2025-06-16 VITALS — HEIGHT: 70 IN | BODY MASS INDEX: 32.07 KG/M2 | TEMPERATURE: 97.7 F | WEIGHT: 224 LBS

## 2025-06-16 DIAGNOSIS — M54.16 LUMBAR RADICULOPATHY: ICD-10-CM

## 2025-06-16 DIAGNOSIS — G89.4 CHRONIC PAIN SYNDROME: ICD-10-CM

## 2025-06-16 DIAGNOSIS — M51.27 HERNIATED NUCLEUS PULPOSUS, L5-S1: ICD-10-CM

## 2025-06-16 DIAGNOSIS — M48.061 LUMBAR FORAMINAL STENOSIS: Primary | ICD-10-CM

## 2025-06-16 PROCEDURE — G2211 COMPLEX E/M VISIT ADD ON: HCPCS | Performed by: NURSE PRACTITIONER

## 2025-06-16 PROCEDURE — 99214 OFFICE O/P EST MOD 30 MIN: CPT | Performed by: NURSE PRACTITIONER

## 2025-06-16 RX ORDER — GABAPENTIN 400 MG/1
CAPSULE ORAL
Qty: 360 CAPSULE | Refills: 1 | Status: SHIPPED | OUTPATIENT
Start: 2025-06-16

## 2025-06-16 NOTE — PROGRESS NOTES
Name: Cristino Odom      : 1955      MRN: [id number]  Encounter Provider: HEMANTH Murrieta  Encounter Date: 2025   Encounter [facility]  :  Assessment & Plan  Lumbar foraminal stenosis  Lumbar radiculopathy  Herniated nucleus pulposus, L5-S1  Chronic pain syndrome      Cristino Odom is a 69 y.o. male who presents for a follow up office visit in regards to Back Pain and Leg Pain (Upper left side ). The patient has a history of chronic pain syndrome secondary to low back pain, lumbar stenosis, lumbar herniation with radiculopathy and left hip osteoarthritis.  Patient presents today with ongoing left thigh pain.  His pain continues to be stable.  He is taking gabapentin 400 mg 1 tab in the morning 1 tab in the afternoon and 2 tablets at night which continues to provide moderate pain relief without side effects.  Therefore, I will continue him on the medication, and refills were sent to the pharmacy    Orders:    gabapentin (NEURONTIN) 400 mg capsule; Take 1 tab in the a.m., 1 in the afternoon, and 2 tabs at night    Return in about 6 months (around 2025) for Med Check.     My impressions and treatment recommendations were discussed in detail with the patient who verbalized understanding and had no further questions.  Discharge instructions were provided. I personally saw and examined the patient and I agree with the above discussed plan of care.    History of Present Illness     Cristino Odom is a 69 y.o. male who presents for a follow up office visit in regards to Back Pain and Leg Pain (Upper left side ). The patient has a history of chronic pain syndrome secondary to low back pain, lumbar stenosis, lumbar herniation with radiculopathy and left hip osteoarthritis.  The patient's last office visit was 2024, in which he was continued on gabapentin 400 mg 1 tab in the morning 1 tab in the afternoon and 2 tablets at night.  Patient presents today  with ongoing pain that is located in the left posterior thigh.  The pain is intermittent and described as burning. He is rating the pain a 4/10 on the numeric rating scale.    He is taking gabapentin 400 mg 1 tab in the morning, 1 tablet in the afternoon and 2 tablets at night which is providing 60-70% pain relief.  He also does use recreational marijuana. his Primary care physician prescribes him Norco 5/325 mg 1 tab every 6 hours and methocarbamol 750 mg 1 tablet every 6 hours. He also uses marijuana occasionally     The patient had underwent a left L5 and S1 transforaminal epidural steroid injection in 2021 (15% relief) and a left hip injection in 2020 (30-40% relief for 2 weeks)      Patient diabetic. Hemoglobin A1C=7.5 on 3/14/25. On Plavix for PAD    Review of Systems   Musculoskeletal:  Positive for gait problem.       Medical History Reviewed by provider this encounter:     .  Past Medical History   Past Medical History[1]  Past Surgical History[2]  Family History[3]   reports that he quit smoking about 11 years ago. His smoking use included cigarettes. He started smoking about 39 years ago. He has a 27.7 pack-year smoking history. He has been exposed to tobacco smoke. He has never used smokeless tobacco. He reports that he does not currently use alcohol. He reports current drug use. Drug: Marijuana.  Current Outpatient Medications   Medication Instructions    albuterol (PROVENTIL HFA,VENTOLIN HFA) 90 mcg/act inhaler 2 puffs, Inhalation, Every 6 hours PRN    atenolol (TENORMIN) 25 mg tablet 1 tablet, Daily    Blood Glucose Monitoring Suppl MIGUEL ANGEL Does not apply, 2 times daily    clindamycin (CLEOCIN) 150 mg, Every 6 hours    clopidogrel (PLAVIX) 75 mg, Oral, Daily    ezetimibe-simvastatin (VYTORIN) 10-40 mg per tablet 1 tablet, Daily    gabapentin (NEURONTIN) 400 mg capsule Take 1 tab in the a.m., 1 in the afternoon, and 2 tabs at night    glimepiride (AMARYL) 2 mg, Oral, 2 times daily    Glucose Blood (Blood  "Glucose Test Strips 333) STRP 1 each, Does not apply, 2 times daily    HYDROcodone-acetaminophen (NORCO) 5-325 mg per tablet 1 tablet, Oral, Every 6 hours PRN    metFORMIN (GLUCOPHAGE) 1,000 mg, Oral, 2 times daily with meals    methocarbamol (ROBAXIN) 750 mg, Oral, Every 6 hours PRN    nitroglycerin (NITROLINGUAL) 0.4 mg/spray spray As needed    QUEtiapine (SEROquel) 100 mg tablet 1 tablet, Daily    sildenafil (VIAGRA) 100 mg tablet As needed   Allergies[4]      Objective   Temp 97.7 °F (36.5 °C)   Ht 5' 10\" (1.778 m) Comment: Verbal  Wt 102 kg (224 lb)   BMI 32.14 kg/m²      Pain Score:   4  Physical Exam  Constitutional: normal, well developed, well nourished, alert, in no distress and non-toxic and no overt pain behavior.  Eyes: anicteric  HEENT: grossly intact  Neck: supple, symmetric, trachea midline and no masses   Pulmonary: even and unlabored  Cardiovascular: No edema or pitting edema present  Skin: Normal without rashes or lesions and well hydrated  Psychiatric: Mood and affect appropriate  Neurologic: Cranial Nerves II-XII grossly intact  Musculoskeletal: normal    CT LUMBAR SPINE WITHOUT CONTRAST 4/1/24     INDICATION:   trauma.     COMPARISON: CT lumbar spine 1/13/2021     TECHNIQUE:  Contiguous axial images through the lumbar spine were obtained. Sagittal and coronal reconstructions were performed.     IV Contrast: None.     Radiation dose length product (DLP) for this visit:  1625.17 mGy-cm .  This examination, like all CT scans performed in the [facility], was performed utilizing techniques to minimize radiation dose exposure, including the use of   iterative reconstruction and automated exposure control.     IMAGE QUALITY:  Diagnostic.     FINDINGS:     ALIGNMENT:  There are 5 lumbar type vertebral bodies.  Normal alignment of the lumbar spine.  No spondylolysis or spondylolisthesis.     VERTEBRAE: No acute fracture. Stable mild chronic compression deformities of T11-L1.   "   DEGENERATIVE CHANGES:     Lower Thoracic spine: Stable mild multilevel degenerative changes. No critical canal stenosis.     Lumbar spine: Stable severe disc height loss at L5-S1 and moderate disc height loss at L1-2. Stable mild to moderate multilevel facet degenerative changes most prominent at L4-5 and L5-S1. No significant canal stenosis. Stable moderate bilateral   neuroforaminal narrowing at L5-S1, left greater than right.     PARASPINAL SOFT TISSUES: No paraspinal hematoma.     Stable right adrenal low-density nodule attributed to an adenoma. Aortoiliac atherosclerotic disease.     IMPRESSION:     No acute osseous abnormality or malalignment.    Radiology Results Review: I have reviewed radiology reports from 4/1/24 including: CT C-spine.           [1]   Past Medical History:  Diagnosis Date    Allergic 1961    COPD (chronic obstructive pulmonary disease) ([facility]) 2005    Coronary artery disease     CPAP (continuous positive airway pressure) dependence 12/18/2023    Diabetes mellitus ([facility]) 2008    Heart disease     Hypertension 2005    Myocardial infarction ([facility]) 2005    Old myocardial infarction 12/18/2023    Sleep apnea 12/18/2023    Tear of medial meniscus of left knee, subsequent encounter     RESOLVE: [id number]   [2]   Past Surgical History:  Procedure Laterality Date    APPENDECTOMY      COLONOSCOPY      CORONARY ANGIOPLASTY      CORONARY ARTERY BYPASS GRAFT      KNEE ARTHROSCOPY      KNEE SURGERY Right     TONSILLECTOMY AND ADENOIDECTOMY      UMBILICAL HERNIA REPAIR     [3]   Family History  Adopted: Yes   [4]   Allergies  Allergen Reactions    Penicillins Other (See Comments)     hives-emily cefazolin

## 2025-06-16 NOTE — ASSESSMENT & PLAN NOTE
Cristino Odom is a 69 y.o. male who presents for a follow up office visit in regards to Back Pain and Leg Pain (Upper left side ). The patient has a history of chronic pain syndrome secondary to low back pain, lumbar stenosis, lumbar herniation with radiculopathy and left hip osteoarthritis.  Patient presents today with ongoing left thigh pain.  His pain continues to be stable.  He is taking gabapentin 400 mg 1 tab in the morning 1 tab in the afternoon and 2 tablets at night which continues to provide moderate pain relief without side effects.  Therefore, I will continue him on the medication, and refills were sent to the pharmacy    Orders:    gabapentin (NEURONTIN) 400 mg capsule; Take 1 tab in the a.m., 1 in the afternoon, and 2 tabs at night

## 2025-06-18 DIAGNOSIS — R52 PAIN: ICD-10-CM

## 2025-06-18 DIAGNOSIS — E11.8 TYPE 2 DIABETES MELLITUS WITH COMPLICATION, WITHOUT LONG-TERM CURRENT USE OF INSULIN (HCC): ICD-10-CM

## 2025-06-18 DIAGNOSIS — M54.50 LOW BACK PAIN WITHOUT SCIATICA, UNSPECIFIED BACK PAIN LATERALITY, UNSPECIFIED CHRONICITY: ICD-10-CM

## 2025-06-18 RX ORDER — METHOCARBAMOL 750 MG/1
750 TABLET, FILM COATED ORAL EVERY 6 HOURS PRN
Qty: 30 TABLET | Refills: 0 | Status: SHIPPED | OUTPATIENT
Start: 2025-06-18

## 2025-06-18 RX ORDER — HYDROCODONE BITARTRATE AND ACETAMINOPHEN 5; 325 MG/1; MG/1
1 TABLET ORAL EVERY 6 HOURS PRN
Qty: 30 TABLET | Refills: 0 | Status: SHIPPED | OUTPATIENT
Start: 2025-06-18

## 2025-06-18 RX ORDER — GLIMEPIRIDE 2 MG/1
2 TABLET ORAL 2 TIMES DAILY
Qty: 180 TABLET | Refills: 1 | Status: SHIPPED | OUTPATIENT
Start: 2025-06-18

## 2025-07-21 DIAGNOSIS — R52 PAIN: ICD-10-CM

## 2025-07-21 DIAGNOSIS — M54.50 LOW BACK PAIN WITHOUT SCIATICA, UNSPECIFIED BACK PAIN LATERALITY, UNSPECIFIED CHRONICITY: ICD-10-CM

## 2025-07-21 RX ORDER — HYDROCODONE BITARTRATE AND ACETAMINOPHEN 5; 325 MG/1; MG/1
1 TABLET ORAL EVERY 6 HOURS PRN
Qty: 30 TABLET | Refills: 0 | Status: SHIPPED | OUTPATIENT
Start: 2025-07-21

## 2025-07-21 RX ORDER — METHOCARBAMOL 750 MG/1
750 TABLET, FILM COATED ORAL EVERY 6 HOURS PRN
Qty: 30 TABLET | Refills: 0 | Status: SHIPPED | OUTPATIENT
Start: 2025-07-21

## 2025-08-18 DIAGNOSIS — J20.9 COPD (CHRONIC OBSTRUCTIVE PULMONARY DISEASE) WITH ACUTE BRONCHITIS  (HCC): ICD-10-CM

## 2025-08-18 DIAGNOSIS — M54.50 LOW BACK PAIN WITHOUT SCIATICA, UNSPECIFIED BACK PAIN LATERALITY, UNSPECIFIED CHRONICITY: ICD-10-CM

## 2025-08-18 DIAGNOSIS — R52 PAIN: ICD-10-CM

## 2025-08-18 DIAGNOSIS — J44.0 COPD (CHRONIC OBSTRUCTIVE PULMONARY DISEASE) WITH ACUTE BRONCHITIS  (HCC): ICD-10-CM

## 2025-08-18 RX ORDER — METHOCARBAMOL 750 MG/1
750 TABLET, FILM COATED ORAL EVERY 6 HOURS PRN
Qty: 30 TABLET | Refills: 0 | Status: SHIPPED | OUTPATIENT
Start: 2025-08-18

## 2025-08-18 RX ORDER — HYDROCODONE BITARTRATE AND ACETAMINOPHEN 5; 325 MG/1; MG/1
1 TABLET ORAL EVERY 6 HOURS PRN
Qty: 30 TABLET | Refills: 0 | Status: SHIPPED | OUTPATIENT
Start: 2025-08-18

## 2025-08-20 RX ORDER — ALBUTEROL SULFATE 90 UG/1
2 INHALANT RESPIRATORY (INHALATION) EVERY 6 HOURS PRN
Qty: 18 G | Refills: 2 | Status: SHIPPED | OUTPATIENT
Start: 2025-08-20